# Patient Record
Sex: MALE | Race: WHITE | NOT HISPANIC OR LATINO | URBAN - METROPOLITAN AREA
[De-identification: names, ages, dates, MRNs, and addresses within clinical notes are randomized per-mention and may not be internally consistent; named-entity substitution may affect disease eponyms.]

---

## 2018-12-23 ENCOUNTER — INPATIENT (INPATIENT)
Facility: HOSPITAL | Age: 78
LOS: 15 days | Discharge: HOME | DRG: 149 | End: 2019-01-08
Attending: INTERNAL MEDICINE | Admitting: INTERNAL MEDICINE
Payer: COMMERCIAL

## 2018-12-23 VITALS
RESPIRATION RATE: 19 BRPM | WEIGHT: 250 LBS | SYSTOLIC BLOOD PRESSURE: 138 MMHG | TEMPERATURE: 98 F | DIASTOLIC BLOOD PRESSURE: 89 MMHG | OXYGEN SATURATION: 93 % | HEART RATE: 92 BPM | HEIGHT: 68 IN

## 2018-12-23 LAB
ALBUMIN SERPL ELPH-MCNC: 3.6 G/DL — SIGNIFICANT CHANGE UP (ref 3.5–5)
ALP SERPL-CCNC: 71 U/L — SIGNIFICANT CHANGE UP (ref 40–120)
ALT FLD-CCNC: 32 U/L DA — SIGNIFICANT CHANGE UP (ref 10–60)
ANION GAP SERPL CALC-SCNC: 9 MMOL/L — SIGNIFICANT CHANGE UP (ref 5–17)
APPEARANCE UR: CLEAR — SIGNIFICANT CHANGE UP
APTT BLD: 28.7 SEC — SIGNIFICANT CHANGE UP (ref 27.5–36.3)
AST SERPL-CCNC: 17 U/L — SIGNIFICANT CHANGE UP (ref 10–40)
BILIRUB SERPL-MCNC: 0.4 MG/DL — SIGNIFICANT CHANGE UP (ref 0.2–1.2)
BILIRUB UR-MCNC: NEGATIVE — SIGNIFICANT CHANGE UP
BUN SERPL-MCNC: 16 MG/DL — SIGNIFICANT CHANGE UP (ref 7–18)
CALCIUM SERPL-MCNC: 8.4 MG/DL — SIGNIFICANT CHANGE UP (ref 8.4–10.5)
CHLORIDE SERPL-SCNC: 102 MMOL/L — SIGNIFICANT CHANGE UP (ref 96–108)
CO2 SERPL-SCNC: 25 MMOL/L — SIGNIFICANT CHANGE UP (ref 22–31)
COLOR SPEC: YELLOW — SIGNIFICANT CHANGE UP
CREAT SERPL-MCNC: 1.04 MG/DL — SIGNIFICANT CHANGE UP (ref 0.5–1.3)
DIFF PNL FLD: NEGATIVE — SIGNIFICANT CHANGE UP
GLUCOSE SERPL-MCNC: 204 MG/DL — HIGH (ref 70–99)
GLUCOSE UR QL: 50 MG/DL
HCT VFR BLD CALC: 46 % — SIGNIFICANT CHANGE UP (ref 39–50)
HGB BLD-MCNC: 15.1 G/DL — SIGNIFICANT CHANGE UP (ref 13–17)
INR BLD: 1.09 RATIO — SIGNIFICANT CHANGE UP (ref 0.88–1.16)
KETONES UR-MCNC: NEGATIVE — SIGNIFICANT CHANGE UP
LEUKOCYTE ESTERASE UR-ACNC: NEGATIVE — SIGNIFICANT CHANGE UP
MCHC RBC-ENTMCNC: 31.5 PG — SIGNIFICANT CHANGE UP (ref 27–34)
MCHC RBC-ENTMCNC: 32.8 GM/DL — SIGNIFICANT CHANGE UP (ref 32–36)
MCV RBC AUTO: 95.9 FL — SIGNIFICANT CHANGE UP (ref 80–100)
NITRITE UR-MCNC: NEGATIVE — SIGNIFICANT CHANGE UP
NT-PROBNP SERPL-SCNC: 59 PG/ML — SIGNIFICANT CHANGE UP (ref 0–450)
PH UR: 6 — SIGNIFICANT CHANGE UP (ref 5–8)
PLATELET # BLD AUTO: 253 K/UL — SIGNIFICANT CHANGE UP (ref 150–400)
POTASSIUM SERPL-MCNC: 3.7 MMOL/L — SIGNIFICANT CHANGE UP (ref 3.5–5.3)
POTASSIUM SERPL-SCNC: 3.7 MMOL/L — SIGNIFICANT CHANGE UP (ref 3.5–5.3)
PROT SERPL-MCNC: 7.5 G/DL — SIGNIFICANT CHANGE UP (ref 6–8.3)
PROT UR-MCNC: NEGATIVE — SIGNIFICANT CHANGE UP
PROTHROM AB SERPL-ACNC: 12.1 SEC — SIGNIFICANT CHANGE UP (ref 10–12.9)
RBC # BLD: 4.8 M/UL — SIGNIFICANT CHANGE UP (ref 4.2–5.8)
RBC # FLD: 12.5 % — SIGNIFICANT CHANGE UP (ref 10.3–14.5)
SODIUM SERPL-SCNC: 136 MMOL/L — SIGNIFICANT CHANGE UP (ref 135–145)
SP GR SPEC: 1.01 — SIGNIFICANT CHANGE UP (ref 1.01–1.02)
TROPONIN I SERPL-MCNC: <0.015 NG/ML — SIGNIFICANT CHANGE UP (ref 0–0.04)
UROBILINOGEN FLD QL: NEGATIVE — SIGNIFICANT CHANGE UP
WBC # BLD: 10.4 K/UL — SIGNIFICANT CHANGE UP (ref 3.8–10.5)
WBC # FLD AUTO: 10.4 K/UL — SIGNIFICANT CHANGE UP (ref 3.8–10.5)

## 2018-12-23 PROCEDURE — 70450 CT HEAD/BRAIN W/O DYE: CPT | Mod: 26

## 2018-12-23 PROCEDURE — 71045 X-RAY EXAM CHEST 1 VIEW: CPT | Mod: 26

## 2018-12-23 PROCEDURE — 74174 CTA ABD&PLVS W/CONTRAST: CPT | Mod: 26

## 2018-12-23 PROCEDURE — 71275 CT ANGIOGRAPHY CHEST: CPT | Mod: 26

## 2018-12-23 RX ORDER — MECLIZINE HCL 12.5 MG
25 TABLET ORAL ONCE
Qty: 0 | Refills: 0 | Status: COMPLETED | OUTPATIENT
Start: 2018-12-23 | End: 2018-12-23

## 2018-12-23 RX ORDER — ONDANSETRON 8 MG/1
4 TABLET, FILM COATED ORAL ONCE
Qty: 0 | Refills: 0 | Status: COMPLETED | OUTPATIENT
Start: 2018-12-23 | End: 2018-12-23

## 2018-12-23 RX ADMIN — ONDANSETRON 4 MILLIGRAM(S): 8 TABLET, FILM COATED ORAL at 23:53

## 2018-12-23 RX ADMIN — Medication 25 MILLIGRAM(S): at 23:52

## 2018-12-23 NOTE — ED PROVIDER NOTE - OBJECTIVE STATEMENT
79 y/o M pt with a PMHx of HTN, DM, HLD, Depression, and Anxiety, and no significant PSHx presents to ED c/o dizziness, sweats, and hot flashes after taking Cefuroxime today at 13:00 and 16:00. Pt also c/o L sided CP, abd pain, and polyuria. Per pt, he was prescribed this medication by his PCP for a sinus infection and first took the medicine at 13:00. Pt reportedly had a hot flash at the time. Pt states that he took another dosage of the medication at 16:00 (prescription instructions were BID), and within 10 minutes had another episode of hot flashes and fell to the floor secondary to dizziness. Pt states that he bumped the L side of his head when he fell. Pt denies nausea, vomiting, diarrhea, smoking, EtOH use, drug use, or any other complaints. Allergies: Penicillin.

## 2018-12-23 NOTE — ED PROVIDER NOTE - MEDICAL DECISION MAKING DETAILS
77 y/o M pt with HTN, DM, HLD, presents to ED c/o vertigo; will obtain EKG, labs, CXR, CT head, and CT abd

## 2018-12-23 NOTE — ED PROVIDER NOTE - PROGRESS NOTE DETAILS
labs show neg trop, CXR shows atelectasis vs pleural effusion in L lung field, CT head unremarkable, CTA chest /A/P shows 4.7x4cm thick walled fluid collection in abdominal wall adjacent to hernia repair site  Discussed above with patient. On reeval patient reports only mild improvement of dizziness/vertigo. reports pain over abdominal wall has been progressing for the past 1yr, described it as a burning sensation. Dr. Allen from surgery consulted for further management.   patient agrees with plan for admission. Passed dysphagia screen.   Discussed above with Dr. Us.

## 2018-12-24 DIAGNOSIS — R42 DIZZINESS AND GIDDINESS: ICD-10-CM

## 2018-12-24 DIAGNOSIS — Z29.9 ENCOUNTER FOR PROPHYLACTIC MEASURES, UNSPECIFIED: ICD-10-CM

## 2018-12-24 DIAGNOSIS — E11.9 TYPE 2 DIABETES MELLITUS WITHOUT COMPLICATIONS: ICD-10-CM

## 2018-12-24 DIAGNOSIS — R18.8 OTHER ASCITES: ICD-10-CM

## 2018-12-24 DIAGNOSIS — I10 ESSENTIAL (PRIMARY) HYPERTENSION: ICD-10-CM

## 2018-12-24 DIAGNOSIS — F32.9 MAJOR DEPRESSIVE DISORDER, SINGLE EPISODE, UNSPECIFIED: ICD-10-CM

## 2018-12-24 LAB
24R-OH-CALCIDIOL SERPL-MCNC: 22.6 NG/ML — LOW (ref 30–80)
ALBUMIN SERPL ELPH-MCNC: 3.9 G/DL — SIGNIFICANT CHANGE UP (ref 3.5–5)
ALP SERPL-CCNC: 72 U/L — SIGNIFICANT CHANGE UP (ref 40–120)
ALT FLD-CCNC: 31 U/L DA — SIGNIFICANT CHANGE UP (ref 10–60)
ANION GAP SERPL CALC-SCNC: 9 MMOL/L — SIGNIFICANT CHANGE UP (ref 5–17)
AST SERPL-CCNC: 14 U/L — SIGNIFICANT CHANGE UP (ref 10–40)
BASOPHILS # BLD AUTO: 0.1 K/UL — SIGNIFICANT CHANGE UP (ref 0–0.2)
BASOPHILS NFR BLD AUTO: 1 % — SIGNIFICANT CHANGE UP (ref 0–2)
BILIRUB SERPL-MCNC: 0.8 MG/DL — SIGNIFICANT CHANGE UP (ref 0.2–1.2)
BUN SERPL-MCNC: 16 MG/DL — SIGNIFICANT CHANGE UP (ref 7–18)
CALCIUM SERPL-MCNC: 8.7 MG/DL — SIGNIFICANT CHANGE UP (ref 8.4–10.5)
CHLORIDE SERPL-SCNC: 103 MMOL/L — SIGNIFICANT CHANGE UP (ref 96–108)
CHOLEST SERPL-MCNC: 200 MG/DL — HIGH (ref 10–199)
CK MB BLD-MCNC: 1.5 % — SIGNIFICANT CHANGE UP (ref 0–3.5)
CK MB CFR SERPL CALC: 2.6 NG/ML — SIGNIFICANT CHANGE UP (ref 0–3.6)
CK SERPL-CCNC: 179 U/L — SIGNIFICANT CHANGE UP (ref 35–232)
CO2 SERPL-SCNC: 25 MMOL/L — SIGNIFICANT CHANGE UP (ref 22–31)
CREAT SERPL-MCNC: 0.94 MG/DL — SIGNIFICANT CHANGE UP (ref 0.5–1.3)
EOSINOPHIL # BLD AUTO: 0.3 K/UL — SIGNIFICANT CHANGE UP (ref 0–0.5)
EOSINOPHIL NFR BLD AUTO: 2.3 % — SIGNIFICANT CHANGE UP (ref 0–6)
FOLATE SERPL-MCNC: 14.1 NG/ML — SIGNIFICANT CHANGE UP
GLUCOSE BLDC GLUCOMTR-MCNC: 132 MG/DL — HIGH (ref 70–99)
GLUCOSE BLDC GLUCOMTR-MCNC: 145 MG/DL — HIGH (ref 70–99)
GLUCOSE BLDC GLUCOMTR-MCNC: 157 MG/DL — HIGH (ref 70–99)
GLUCOSE BLDC GLUCOMTR-MCNC: 264 MG/DL — HIGH (ref 70–99)
GLUCOSE SERPL-MCNC: 164 MG/DL — HIGH (ref 70–99)
HBA1C BLD-MCNC: 7.9 % — HIGH (ref 4–5.6)
HCT VFR BLD CALC: 47.2 % — SIGNIFICANT CHANGE UP (ref 39–50)
HDLC SERPL-MCNC: 43 MG/DL — SIGNIFICANT CHANGE UP
HGB BLD-MCNC: 15.6 G/DL — SIGNIFICANT CHANGE UP (ref 13–17)
LIPID PNL WITH DIRECT LDL SERPL: 126 MG/DL — SIGNIFICANT CHANGE UP
LYMPHOCYTES # BLD AUTO: 2.7 K/UL — SIGNIFICANT CHANGE UP (ref 1–3.3)
LYMPHOCYTES # BLD AUTO: 21 % — SIGNIFICANT CHANGE UP (ref 13–44)
MAGNESIUM SERPL-MCNC: 2.1 MG/DL — SIGNIFICANT CHANGE UP (ref 1.6–2.6)
MCHC RBC-ENTMCNC: 31.6 PG — SIGNIFICANT CHANGE UP (ref 27–34)
MCHC RBC-ENTMCNC: 33 GM/DL — SIGNIFICANT CHANGE UP (ref 32–36)
MCV RBC AUTO: 95.6 FL — SIGNIFICANT CHANGE UP (ref 80–100)
MONOCYTES # BLD AUTO: 0.9 K/UL — SIGNIFICANT CHANGE UP (ref 0–0.9)
MONOCYTES NFR BLD AUTO: 6.8 % — SIGNIFICANT CHANGE UP (ref 2–14)
NEUTROPHILS # BLD AUTO: 8.7 K/UL — HIGH (ref 1.8–7.4)
NEUTROPHILS NFR BLD AUTO: 68.8 % — SIGNIFICANT CHANGE UP (ref 43–77)
PHOSPHATE SERPL-MCNC: 3.3 MG/DL — SIGNIFICANT CHANGE UP (ref 2.5–4.5)
PLATELET # BLD AUTO: 276 K/UL — SIGNIFICANT CHANGE UP (ref 150–400)
POTASSIUM SERPL-MCNC: 3.8 MMOL/L — SIGNIFICANT CHANGE UP (ref 3.5–5.3)
POTASSIUM SERPL-SCNC: 3.8 MMOL/L — SIGNIFICANT CHANGE UP (ref 3.5–5.3)
PROT SERPL-MCNC: 7.7 G/DL — SIGNIFICANT CHANGE UP (ref 6–8.3)
RBC # BLD: 4.94 M/UL — SIGNIFICANT CHANGE UP (ref 4.2–5.8)
RBC # FLD: 12.4 % — SIGNIFICANT CHANGE UP (ref 10.3–14.5)
SODIUM SERPL-SCNC: 137 MMOL/L — SIGNIFICANT CHANGE UP (ref 135–145)
TOTAL CHOLESTEROL/HDL RATIO MEASUREMENT: 4.7 RATIO — SIGNIFICANT CHANGE UP (ref 3.4–9.6)
TRIGL SERPL-MCNC: 154 MG/DL — HIGH (ref 10–149)
TROPONIN I SERPL-MCNC: <0.015 NG/ML — SIGNIFICANT CHANGE UP (ref 0–0.04)
TSH SERPL-MCNC: 2.39 UU/ML — SIGNIFICANT CHANGE UP (ref 0.34–4.82)
VIT B12 SERPL-MCNC: 366 PG/ML — SIGNIFICANT CHANGE UP (ref 232–1245)
WBC # BLD: 12.7 K/UL — HIGH (ref 3.8–10.5)
WBC # FLD AUTO: 12.7 K/UL — HIGH (ref 3.8–10.5)

## 2018-12-24 PROCEDURE — 99285 EMERGENCY DEPT VISIT HI MDM: CPT

## 2018-12-24 RX ORDER — METFORMIN HYDROCHLORIDE 850 MG/1
0 TABLET ORAL
Qty: 0 | Refills: 0 | COMMUNITY

## 2018-12-24 RX ORDER — ATORVASTATIN CALCIUM 80 MG/1
40 TABLET, FILM COATED ORAL AT BEDTIME
Qty: 0 | Refills: 0 | Status: DISCONTINUED | OUTPATIENT
Start: 2018-12-24 | End: 2019-01-08

## 2018-12-24 RX ORDER — PANTOPRAZOLE SODIUM 20 MG/1
40 TABLET, DELAYED RELEASE ORAL
Qty: 0 | Refills: 0 | Status: DISCONTINUED | OUTPATIENT
Start: 2018-12-24 | End: 2019-01-08

## 2018-12-24 RX ORDER — ATORVASTATIN CALCIUM 80 MG/1
0 TABLET, FILM COATED ORAL
Qty: 0 | Refills: 0 | COMMUNITY

## 2018-12-24 RX ORDER — ESCITALOPRAM OXALATE 10 MG/1
1 TABLET, FILM COATED ORAL
Qty: 0 | Refills: 0 | COMMUNITY

## 2018-12-24 RX ORDER — RISPERIDONE 4 MG/1
1 TABLET ORAL
Qty: 0 | Refills: 0 | COMMUNITY

## 2018-12-24 RX ORDER — ESCITALOPRAM OXALATE 10 MG/1
0 TABLET, FILM COATED ORAL
Qty: 0 | Refills: 0 | COMMUNITY

## 2018-12-24 RX ORDER — RISPERIDONE 4 MG/1
0 TABLET ORAL
Qty: 0 | Refills: 0 | COMMUNITY

## 2018-12-24 RX ORDER — RISPERIDONE 4 MG/1
0.5 TABLET ORAL DAILY
Qty: 0 | Refills: 0 | Status: DISCONTINUED | OUTPATIENT
Start: 2018-12-24 | End: 2019-01-08

## 2018-12-24 RX ORDER — INSULIN LISPRO 100/ML
VIAL (ML) SUBCUTANEOUS
Qty: 0 | Refills: 0 | Status: DISCONTINUED | OUTPATIENT
Start: 2018-12-24 | End: 2019-01-08

## 2018-12-24 RX ORDER — HEPARIN SODIUM 5000 [USP'U]/ML
5000 INJECTION INTRAVENOUS; SUBCUTANEOUS EVERY 12 HOURS
Qty: 0 | Refills: 0 | Status: DISCONTINUED | OUTPATIENT
Start: 2018-12-24 | End: 2018-12-27

## 2018-12-24 RX ORDER — LISINOPRIL 2.5 MG/1
20 TABLET ORAL DAILY
Qty: 0 | Refills: 0 | Status: DISCONTINUED | OUTPATIENT
Start: 2018-12-24 | End: 2018-12-26

## 2018-12-24 RX ORDER — ATORVASTATIN CALCIUM 80 MG/1
1 TABLET, FILM COATED ORAL
Qty: 0 | Refills: 0 | COMMUNITY

## 2018-12-24 RX ORDER — CARVEDILOL PHOSPHATE 80 MG/1
0 CAPSULE, EXTENDED RELEASE ORAL
Qty: 0 | Refills: 0 | COMMUNITY

## 2018-12-24 RX ORDER — DEXTROSE 50 % IN WATER 50 %
15 SYRINGE (ML) INTRAVENOUS ONCE
Qty: 0 | Refills: 0 | Status: DISCONTINUED | OUTPATIENT
Start: 2018-12-24 | End: 2018-12-24

## 2018-12-24 RX ORDER — METFORMIN HYDROCHLORIDE 850 MG/1
1 TABLET ORAL
Qty: 0 | Refills: 0 | COMMUNITY

## 2018-12-24 RX ORDER — DEXTROSE 50 % IN WATER 50 %
25 SYRINGE (ML) INTRAVENOUS ONCE
Qty: 0 | Refills: 0 | Status: DISCONTINUED | OUTPATIENT
Start: 2018-12-24 | End: 2018-12-24

## 2018-12-24 RX ORDER — DEXTROSE 50 % IN WATER 50 %
12.5 SYRINGE (ML) INTRAVENOUS ONCE
Qty: 0 | Refills: 0 | Status: DISCONTINUED | OUTPATIENT
Start: 2018-12-24 | End: 2018-12-24

## 2018-12-24 RX ORDER — GLUCAGON INJECTION, SOLUTION 0.5 MG/.1ML
1 INJECTION, SOLUTION SUBCUTANEOUS ONCE
Qty: 0 | Refills: 0 | Status: DISCONTINUED | OUTPATIENT
Start: 2018-12-24 | End: 2018-12-24

## 2018-12-24 RX ORDER — ESCITALOPRAM OXALATE 10 MG/1
20 TABLET, FILM COATED ORAL DAILY
Qty: 0 | Refills: 0 | Status: DISCONTINUED | OUTPATIENT
Start: 2018-12-24 | End: 2019-01-08

## 2018-12-24 RX ORDER — SODIUM CHLORIDE 9 MG/ML
1000 INJECTION, SOLUTION INTRAVENOUS
Qty: 0 | Refills: 0 | Status: DISCONTINUED | OUTPATIENT
Start: 2018-12-24 | End: 2018-12-24

## 2018-12-24 RX ADMIN — Medication 1: at 07:51

## 2018-12-24 RX ADMIN — HEPARIN SODIUM 5000 UNIT(S): 5000 INJECTION INTRAVENOUS; SUBCUTANEOUS at 17:42

## 2018-12-24 RX ADMIN — Medication 30 MILLILITER(S): at 11:02

## 2018-12-24 RX ADMIN — LISINOPRIL 20 MILLIGRAM(S): 2.5 TABLET ORAL at 06:27

## 2018-12-24 RX ADMIN — ESCITALOPRAM OXALATE 20 MILLIGRAM(S): 10 TABLET, FILM COATED ORAL at 11:02

## 2018-12-24 RX ADMIN — PANTOPRAZOLE SODIUM 40 MILLIGRAM(S): 20 TABLET, DELAYED RELEASE ORAL at 11:02

## 2018-12-24 RX ADMIN — HEPARIN SODIUM 5000 UNIT(S): 5000 INJECTION INTRAVENOUS; SUBCUTANEOUS at 06:26

## 2018-12-24 RX ADMIN — ATORVASTATIN CALCIUM 40 MILLIGRAM(S): 80 TABLET, FILM COATED ORAL at 21:45

## 2018-12-24 RX ADMIN — Medication 3: at 13:17

## 2018-12-24 RX ADMIN — RISPERIDONE 0.5 MILLIGRAM(S): 4 TABLET ORAL at 11:02

## 2018-12-24 NOTE — H&P ADULT - PROBLEM SELECTOR PLAN 6
IMPROVE VTE Individual Risk Assessment          RISK                                                          Points  [  ] Previous VTE                                                3  [  ] Thrombophilia                                             2  [  ] Lower limb paralysis                                   2        (unable to hold up >15 seconds)    [  ] Current Cancer                                             2         (within 6 months)  [ x ] Immobilization > 24 hrs                              1  [  ] ICU/CCU stay > 24 hours                             1  [ x ] Age > 60                                                         1    IMPROVE VTE Score: 2  HSQ for DVT chemoppx

## 2018-12-24 NOTE — ED ADULT NURSE NOTE - OBJECTIVE STATEMENT
Pt. c/o dizziness. Pt. stated he almost fell. Pt. c/o upset stomach "my stomach burns when I take medication".

## 2018-12-24 NOTE — H&P ADULT - ASSESSMENT
78M, from home, AAOx3, w/ PMHx T2DM, HTN, HLD, Depression who presents to the ED c/o dizziness and diaphoresis after taking Cefuroxime 500 mg at 4pm, in addition to face flushing and nausea. Patient mentions symptoms started 15 minutes after taking medication, afterwards, he stood up to grab some water, felt dizzy and fell to the floor. Denies chest pain, palpitations, weakness, LOC or any other complaint. Patient was diagnosed with sinusitis by ENT and was prescribed Cefuroxime x 7 days. Upon further questioning, patient endorses PCN allergy in the past.
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
## 2018-12-24 NOTE — ED ADULT NURSE NOTE - NSIMPLEMENTINTERV_GEN_ALL_ED
Implemented All Fall with Harm Risk Interventions:  [place] to call system. Call bell, personal items and telephone within reach. Instruct patient to call for assistance. Room bathroom lighting operational. Non-slip footwear when patient is off stretcher. Physically safe environment: no spills, clutter or unnecessary equipment. Stretcher in lowest position, wheels locked, appropriate side rails in place. Provide visual cue, wrist band, yellow gown, etc. Monitor gait and stability. Monitor for mental status changes and reorient to person, place, and time. Review medications for side effects contributing to fall risk. Reinforce activity limits and safety measures with patient and family. Provide visual clues: red socks.

## 2018-12-24 NOTE — ED ADULT NURSE REASSESSMENT NOTE - NS ED NURSE REASSESS COMMENT FT1
Pt. c/o chest pain. Dr. Haywood notified and made aware and is going to assess pt. Pt. is on continuos cardiac monitor at this time at normal sinus rhythm.

## 2018-12-24 NOTE — H&P ADULT - PROBLEM SELECTOR PLAN 1
Patient p/w dizziness, diaphoresis, nausea and face flushing after taking Cefuroxime. Symptoms most likely due to allergic reaction.   Asymptomatic at the moment.  ECG: NSR VR@92bpm T1: negative  CT brain: no acute pathology  c/w supportive care

## 2018-12-24 NOTE — ED ADULT NURSE NOTE - ED STAT RN HANDOFF DETAILS
received Pt alert and verbally responsive NAD on N/C 3lit/min telemetry in progress transferred to [facility] endorsed to Ally PICKETT safety maintained

## 2018-12-24 NOTE — H&P ADULT - PROBLEM SELECTOR PLAN 2
Patient w/ Hx of hernia repair 10yrs ago, denies abdominal pain  CT A/P: 4.7 x 0.7 x 4.0 cm thick-walled fluid collection within the anterior   abdominal and an umbilical hernia repair site. No significant surrounding   inflammation.  Surgery consulted in the ED

## 2018-12-24 NOTE — H&P ADULT - ATTENDING COMMENTS
78M, from home, AAOx3, w/ PMHx T2DM, HTN, HLD, Depression who presents to the ED c/o dizziness and diaphoresis after taking Cefuroxime 500 mg at 4pm, in addition to face flushing and nausea. Patient mentions symptoms started 15 minutes after taking medication, afterwards, he stood up to grab some water, felt dizzy and fell to the floor. Denies chest pain, palpitations, weakness, LOC or any other complaint. Patient was diagnosed with sinusitis by ENT and was prescribed Cefuroxime x 7 days. Upon further questioning, patient endorses PCN allergy in the past.     pt seen in bed, vitals stable, physical exam reveals lungs cta b/l, heart s1s2, abd soft nd nt, midline surg scar, bs+, ext no edema. labs and diagnostic test result reviewed.    assessment  --  vasovagal episode possibly 2nd acute drug reaction, abdominal wall fluid collection, h/o DM, HTN, HLD, Depression    plan  --  admit to med, d/c cefuroxime, maalox, cont preadmit home meds, gi and dvt profilaxis,  cbc, bmp, mg, phos, lipids, tsh,    surg cons

## 2018-12-25 DIAGNOSIS — E55.9 VITAMIN D DEFICIENCY, UNSPECIFIED: ICD-10-CM

## 2018-12-25 LAB
CULTURE RESULTS: SIGNIFICANT CHANGE UP
GLUCOSE BLDC GLUCOMTR-MCNC: 139 MG/DL — HIGH (ref 70–99)
GLUCOSE BLDC GLUCOMTR-MCNC: 145 MG/DL — HIGH (ref 70–99)
GLUCOSE BLDC GLUCOMTR-MCNC: 178 MG/DL — HIGH (ref 70–99)
GLUCOSE BLDC GLUCOMTR-MCNC: 225 MG/DL — HIGH (ref 70–99)
SPECIMEN SOURCE: SIGNIFICANT CHANGE UP

## 2018-12-25 PROCEDURE — 99222 1ST HOSP IP/OBS MODERATE 55: CPT

## 2018-12-25 RX ORDER — RAMIPRIL 5 MG
1 CAPSULE ORAL
Qty: 30 | Refills: 0 | OUTPATIENT
Start: 2018-12-25 | End: 2019-01-23

## 2018-12-25 RX ORDER — CHOLECALCIFEROL (VITAMIN D3) 125 MCG
1000 CAPSULE ORAL DAILY
Qty: 0 | Refills: 0 | Status: DISCONTINUED | OUTPATIENT
Start: 2018-12-25 | End: 2018-12-26

## 2018-12-25 RX ORDER — LISINOPRIL 2.5 MG/1
1 TABLET ORAL
Qty: 0 | Refills: 0 | COMMUNITY
Start: 2018-12-25

## 2018-12-25 RX ORDER — SODIUM CHLORIDE 9 MG/ML
500 INJECTION INTRAMUSCULAR; INTRAVENOUS; SUBCUTANEOUS ONCE
Qty: 0 | Refills: 0 | Status: COMPLETED | OUTPATIENT
Start: 2018-12-25 | End: 2018-12-25

## 2018-12-25 RX ORDER — MECLIZINE HCL 12.5 MG
25 TABLET ORAL EVERY 8 HOURS
Qty: 0 | Refills: 0 | Status: DISCONTINUED | OUTPATIENT
Start: 2018-12-25 | End: 2019-01-08

## 2018-12-25 RX ORDER — CHOLECALCIFEROL (VITAMIN D3) 125 MCG
1 CAPSULE ORAL
Qty: 30 | Refills: 0 | OUTPATIENT
Start: 2018-12-25 | End: 2019-01-23

## 2018-12-25 RX ORDER — CEFUROXIME AXETIL 250 MG
1 TABLET ORAL
Qty: 0 | Refills: 0 | COMMUNITY

## 2018-12-25 RX ORDER — RAMIPRIL 5 MG
1 CAPSULE ORAL
Qty: 0 | Refills: 0 | COMMUNITY

## 2018-12-25 RX ADMIN — Medication 25 MILLIGRAM(S): at 21:41

## 2018-12-25 RX ADMIN — Medication 25 MILLIGRAM(S): at 15:30

## 2018-12-25 RX ADMIN — RISPERIDONE 0.5 MILLIGRAM(S): 4 TABLET ORAL at 11:48

## 2018-12-25 RX ADMIN — ESCITALOPRAM OXALATE 20 MILLIGRAM(S): 10 TABLET, FILM COATED ORAL at 11:48

## 2018-12-25 RX ADMIN — PANTOPRAZOLE SODIUM 40 MILLIGRAM(S): 20 TABLET, DELAYED RELEASE ORAL at 06:04

## 2018-12-25 RX ADMIN — LISINOPRIL 20 MILLIGRAM(S): 2.5 TABLET ORAL at 06:04

## 2018-12-25 RX ADMIN — ATORVASTATIN CALCIUM 40 MILLIGRAM(S): 80 TABLET, FILM COATED ORAL at 21:41

## 2018-12-25 RX ADMIN — Medication 1: at 22:28

## 2018-12-25 RX ADMIN — SODIUM CHLORIDE 1000 MILLILITER(S): 9 INJECTION INTRAMUSCULAR; INTRAVENOUS; SUBCUTANEOUS at 20:36

## 2018-12-25 RX ADMIN — HEPARIN SODIUM 5000 UNIT(S): 5000 INJECTION INTRAVENOUS; SUBCUTANEOUS at 06:04

## 2018-12-25 RX ADMIN — HEPARIN SODIUM 5000 UNIT(S): 5000 INJECTION INTRAVENOUS; SUBCUTANEOUS at 17:40

## 2018-12-25 RX ADMIN — Medication 1000 UNIT(S): at 11:48

## 2018-12-25 RX ADMIN — Medication 2: at 11:48

## 2018-12-25 NOTE — DISCHARGE NOTE ADULT - PLAN OF CARE
You were found to have 4.7 x 0.7 x 4.0 cm thick-walled fluid collection within the anterior abdominal and an umbilical hernia repair site. No significant surrounding inflammation on CT scan of your abdomen. You were seen by Dr Ho, surgeon who did not recommend surgical intervention. Hemoglobin A1C<7.0 Your hemoglobin A1C on admission was 7.9. COntinue with home dose of metformin. You are advised a diabetic diet with consistent carbohydrates, exercise regularly. Measure your blood sugars on a regular basis. Follow up with your primary doctor. Blood pressure <130/80 mm Hg Continue with your medications as prescribed. Vitamin D levels 30-80 Your vitamin D levels on admission were 22. Continue with vitamin D supplement as prescribed. Prevention of recurrence You were admitted after you reported lightheadedness and flushing of face after taking cefuroxime. An infection was ruled out. You were diagnosed to have an allergic reaction to the cefuroxime which was likely cause for your lightheadedness. Your hemoglobin A1C on admission was 7.9. Continue with home dose of metformin. You are advised a diabetic diet with consistent carbohydrates, exercise regularly. Measure your blood sugars on a regular basis. Follow up with your primary doctor. You were admitted after you reported lightheadedness and flushing of face after taking cefuroxime. An infection was ruled out. You were seen by neurologist Dr Forrest for continued vertigo, MRI and MRA head were done which ruled out stroke. You developed weakness of legs greater than that of arms and were unable to walk for which you are being transferred to [place] for further management. You presented with vertigo-like symptoms, diaphoresis, nausea and face flushing after taking Cefuroxime. EKG was normal. MRI/MRA showed no acute infarct or aneurysm but chronic microvascular ischemic changes. For suspected mastoiditis, you completed Levaquin 500mg daily x 7 days. Echocardiogram showed grossly normal LV function, mild aortic stenosis, grade 1 diastolic dysfunction. Neurologist Lon suggested that your symptoms could be secondary to a variant of Guillain-Barré syndrome variant or myasthenia gravis. You reported chest pain, shortness of breath, and itchiness during IVIG treatment on 12/30. You tolerated next IVIG injection 12/31 with premedications suggested by Dr. Forrest. However, further IVIG treatments were held due to the side effects you had and the increased risk of MI with IVIG treatment. Chest pain and shortness of breath resolved shortly after the termination of treatment. Your abnormal extraocular muscle movement and diplopia resolved with the 3 days of IVIG treatment. CSF studies was grossly normal. Paraneoplastic antibody profile was negative. We urge you to follow up with Neurologist Dr. Suero as an outpatient for nerve conduction velocity and electromyography testings. You were given business card of Dr. Suero. In case you lost the contact information, please  refer to the following contact information of Dr. Suero.  Phone: [phone]  [facility] [address], [address] [place] [address] [place] [address] You complained of burning abdominal pain during the latter half of your admission. Maalox and PPI were continued for symptomatically management. GI specialist Dr. Pierre performed EGD/Colonoscopy, which showed GERD, gastritis, and diverticula without diverticulitis, resected, sent for pathology. You are urged to follow up with Dr. Pierre for further management of his symptoms and pathology follow up. Dr. Pierre's contact information is listed below.

## 2018-12-25 NOTE — CONSULT NOTE ADULT - ASSESSMENT
Truncal ataxia and EOM disorder that is difficult to examine because he cannot sustain effort to keep his eyes open, lethargy, and staggering could be signs of brain stem infarct - "parinaud's syndrome" or cerebellar infarct will have to be investigated by MRI brain and MRA head  Generalized weakness blurred vision and ptosis with intermittent ophthalmoplegia preserved reflexes and inability to stand could be consistent with myasthenia gravis. Fatigue muscle weakness and EOM disorder are consistent with myastheia gravis- recommend investigate by  acterylcholine receptor antibody. Continue aspirin atorvastatin and BP control

## 2018-12-25 NOTE — DISCHARGE NOTE ADULT - MEDICATION SUMMARY - MEDICATIONS TO TAKE
I will START or STAY ON the medications listed below when I get home from the hospital:    lisinopril 20 mg oral tablet  -- 1 tab(s) by mouth once a day  -- Indication: For HTN (hypertension)    escitalopram 20 mg oral tablet  -- 1 tab(s) by mouth once a day  -- Indication: For Depression    metFORMIN 500 mg oral tablet  -- 1 tab(s) by mouth 3 times a day  -- Indication: For DM (diabetes mellitus)    Lipitor 40 mg oral tablet  -- 1 tab(s) by mouth once a day  -- Indication: For HLD (hyperlipidemia)    risperiDONE 0.5 mg oral tablet  -- 1 tab(s) by mouth once a day  -- Indication: For Depression    cholecalciferol oral tablet  -- 1 tab(s) by mouth once a day   -- Indication: For Vitamin D deficiency I will START or STAY ON the medications listed below when I get home from the hospital:    ramipril 5 mg oral capsule  -- 1 cap(s) by mouth once a day  -- Indication: For HTN (hypertension)    escitalopram 20 mg oral tablet  -- 1 tab(s) by mouth once a day  -- Indication: For Depression    metFORMIN 500 mg oral tablet  -- 1 tab(s) by mouth 3 times a day  -- Indication: For DM (diabetes mellitus)    Lipitor 40 mg oral tablet  -- 1 tab(s) by mouth once a day  -- Indication: For HLD (hyperlipidemia)    risperiDONE 0.5 mg oral tablet  -- 1 tab(s) by mouth once a day  -- Indication: For Depression    cholecalciferol oral tablet  -- 1 tab(s) by mouth once a day   -- Indication: For Vitamin D deficiency I will START or STAY ON the medications listed below when I get home from the hospital:    ramipril 5 mg oral capsule  -- 1 cap(s) by mouth once a day  -- Indication: For HTN (hypertension)    escitalopram 20 mg oral tablet  -- 1 tab(s) by mouth once a day  -- Indication: For Depression    metFORMIN 500 mg oral tablet  -- 1 tab(s) by mouth 3 times a day  -- Indication: For DM (diabetes mellitus)    Lipitor 40 mg oral tablet  -- 1 tab(s) by mouth once a day  -- Indication: For HLD (hyperlipidemia)    risperiDONE 0.5 mg oral tablet  -- 1 tab(s) by mouth once a day  -- Indication: For Depression    omeprazole 40 mg oral delayed release capsule  -- 1 cap(s) by mouth once a day (in the morning) MDD:1 tablet  -- It is very important that you take or use this exactly as directed.  Do not skip doses or discontinue unless directed by your doctor.  Obtain medical advice before taking any non-prescription drugs as some may affect the action of this medication.  Swallow whole.  Do not crush.    -- Indication: For GERD, gastritis    Calcet 500 mg-400 intl units oral tablet, chewable  -- 1 tab(s) chewed once a day   -- Take with food or milk.    -- Indication: For Supplement    cholecalciferol oral tablet  -- 1 tab(s) by mouth once a day   -- Indication: For Vitamin D deficiency

## 2018-12-25 NOTE — DISCHARGE NOTE ADULT - MEDICATION SUMMARY - MEDICATIONS TO STOP TAKING
I will STOP taking the medications listed below when I get home from the hospital:    cefuroxime 500 mg oral tablet  -- 1 tab(s) by mouth every 12 hours    benzonatate 200 mg oral capsule  -- 1 cap(s) by mouth 3 times a day

## 2018-12-25 NOTE — CONSULT NOTE ADULT - SUBJECTIVE AND OBJECTIVE BOX
Patient is a 78y old  Male who presents with a chief complaint of dizziness (25 Dec 2018 13:41)      HPI: About 3 weeks ago he noticed dizziness associated with headache, nasal discharge and ear discomfort. Examined by ENT, he was prescribed an antibiotic, but after the first dose he fell to the floor and could not get up by himself. Now he reports blurred vision continuing dizziness that is qualified as a sensation of motion not spinning and a sensation of unsteadiness.     PAST MEDICAL & SURGICAL HISTORY:  Anxiety  Depression  HLD (hyperlipidemia)  DM (diabetes mellitus)  HTN (hypertension)  No significant past surgical history      FAMILY HISTORY:  Family history of heart disease (Father)        Social Hx:  Nonsmoker, no drug or alcohol use    MEDICATIONS  (STANDING):  atorvastatin 40 milliGRAM(s) Oral at bedtime  cholecalciferol 1000 Unit(s) Oral daily  escitalopram 20 milliGRAM(s) Oral daily  heparin  Injectable 5000 Unit(s) SubCutaneous every 12 hours  insulin lispro (HumaLOG) corrective regimen sliding scale   SubCutaneous Before meals and at bedtime  lisinopril 20 milliGRAM(s) Oral daily  meclizine 25 milliGRAM(s) Oral every 8 hours  pantoprazole    Tablet 40 milliGRAM(s) Oral before breakfast  risperiDONE   Tablet 0.5 milliGRAM(s) Oral daily       Allergies    penicillin (Hives)    Intolerances        ROS: Pertinent positives in HPI, all other ROS were reviewed and are negative.      Vital Signs Last 24 Hrs  T(C): 36.7 (25 Dec 2018 14:44), Max: 37.1 (25 Dec 2018 01:50)  T(F): 98 (25 Dec 2018 14:44), Max: 98.7 (25 Dec 2018 01:50)  HR: 80 (25 Dec 2018 14:44) (79 - 91)  BP: 104/49 (25 Dec 2018 14:44) (92/47 - 126/76)  BP(mean): --  RR: 17 (25 Dec 2018 14:44) (16 - 18)  SpO2: 96% (25 Dec 2018 14:44) (91% - 96%)        Constitutional: awake and alert.  HEENT: PERRLA, EOMI,   Neck: Supple.  Respiratory: Breath sounds are clear bilaterally  Cardiovascular: S1 and S2, regular / irregular rhythm  Gastrointestinal: soft, nontender  Extremities:  no edema  Vascular: Carotid Bruit - no  Musculoskeletal: no joint swelling/tenderness, no abnormal movements  Skin: No rashes    Neurological exam:  HF: A x O x 3. Appropriately interactive, normal affect. Speech fluent, No Aphasia or paraphasic errors. Naming /repetition intact   CN: JOE, EOMI variably weak in left conjugate gaze, VFF, facial sensation normal, no NLFD, tongue midline, Palate moves equally, SCM equal bilaterally  Motor: No pronator drift, Strength 3/5 in all 4 ext, normal bulk and tone, no tremor, rigidity or bradykinesia.    Sens: Intact to light touch / PP/ VS/ JS    Reflexes: Symmetric and normal . BJ 2+, BR 2+, KJ 1+, AJ1+, downgoing toes b/l  Coord:  No FNFA, dysmetria, JUAN intact but demonstrates past pointing  Gait/Balance: cannot stand with feet together, cannot walk tandem    NIHSS:3          Labs:                        15.6   12.7  )-----------( 276      ( 24 Dec 2018 04:49 )             47.2     12-24    137  |  103  |  16  ----------------------------<  164<H>  3.8   |  25  |  0.94    Ca    8.7      24 Dec 2018 04:53  Phos  3.3     12-24  Mg     2.1     12-24    TPro  7.7  /  Alb  3.9  /  TBili  0.8  /  DBili  x   /  AST  14  /  ALT  31  /  AlkPhos  72  12-24    12-24 [id number] 7.9    12-24 Chol 200<H>  HDL 43 Trig 154<H>  PT/INR - ( 23 Dec 2018 22:26 )   PT: 12.1 sec;   INR: 1.09 ratio         PTT - ( 23 Dec 2018 22:26 )  PTT:28.7 sec    Radiology report:  - CT head:  < from: CT Head No Cont (12.23.18 @ 23:08) >  EXAM:  CT BRAIN                            PROCEDURE DATE:  12/23/2018          INTERPRETATION:  CLINICAL INFORMATION: Vertigo.    COMPARISON: None available.    PROCEDURE:   Noncontrast CT of the Head was performed from the skull base to the   vertex. Coronal and Sagittal reformats were obtained.    FINDINGS:    There is no acute intracranial hemorrhage, mass effect, midline shift,   extra-axial collection, hydrocephalus, or evidence of acute vascular   territorial infarction. Mild patchy hypodensities within the   periventricular and subcortical white matter, although nonspecific,   likely reflect chronic microvascular disease.    The visualized paranasal sinuses and mastoid air cells are clear. Status   post bilateral cataract surgery. Visualized osseous structures are intact.    IMPRESSION:     No acute intracranial hemorrhage, mass effect, or evidence of acute   vascular territorial infarction.    If clinical symptoms persist or worsen, more sensitive evaluation with   brain MRI may be obtained, if no contraindications exist.                ZULLY THOMASON M.D., ATTENDING RADIOLOGIST    < end of copied text >    -

## 2018-12-25 NOTE — DISCHARGE NOTE ADULT - CARE PLAN
Principal Discharge DX:	Vasovagal episode  Goal:	Prevention of recurrence  Assessment and plan of treatment:	You were admitted after you reported lightheadedness and flushing of face after taking cefuroxime. An infection was ruled out. You were diagnosed to have an allergic reaction to the cefuroxime which was likely cause for your lightheadedness.  Secondary Diagnosis:	Abdominal wall fluid collections  Assessment and plan of treatment:	You were found to have 4.7 x 0.7 x 4.0 cm thick-walled fluid collection within the anterior abdominal and an umbilical hernia repair site. No significant surrounding inflammation on CT scan of your abdomen. You were seen by Dr Ho, surgeon who did not recommend surgical intervention.  Secondary Diagnosis:	DM (diabetes mellitus)  Goal:	Hemoglobin A1C<7.0  Assessment and plan of treatment:	Your hemoglobin A1C on admission was 7.9. COntinue with home dose of metformin. You are advised a diabetic diet with consistent carbohydrates, exercise regularly. Measure your blood sugars on a regular basis. Follow up with your primary doctor.  Secondary Diagnosis:	Essential hypertension  Goal:	Blood pressure <130/80 mm Hg  Assessment and plan of treatment:	Continue with your medications as prescribed.  Secondary Diagnosis:	Depression  Assessment and plan of treatment:	Continue with your medications as prescribed.  Secondary Diagnosis:	Vitamin D deficiency  Goal:	Vitamin D levels 30-80  Assessment and plan of treatment:	Your vitamin D levels on admission were 22. Continue with vitamin D supplement as prescribed. Principal Discharge DX:	Vertigo  Goal:	Prevention of recurrence  Assessment and plan of treatment:	You were admitted after you reported lightheadedness and flushing of face after taking cefuroxime. An infection was ruled out. You were seen by neurologist Dr Forrest for continued vertigo, MRI and MRA head were done which ruled out stroke. You developed weakness of legs greater than that of arms and were unable to walk for which you are being transferred to [place] for further management.  Secondary Diagnosis:	Abdominal wall fluid collections  Assessment and plan of treatment:	You were found to have 4.7 x 0.7 x 4.0 cm thick-walled fluid collection within the anterior abdominal and an umbilical hernia repair site. No significant surrounding inflammation on CT scan of your abdomen. You were seen by Dr Ho, surgeon who did not recommend surgical intervention.  Secondary Diagnosis:	DM (diabetes mellitus)  Goal:	Hemoglobin A1C<7.0  Assessment and plan of treatment:	Your hemoglobin A1C on admission was 7.9. Continue with home dose of metformin. You are advised a diabetic diet with consistent carbohydrates, exercise regularly. Measure your blood sugars on a regular basis. Follow up with your primary doctor.  Secondary Diagnosis:	Essential hypertension  Goal:	Blood pressure <130/80 mm Hg  Assessment and plan of treatment:	Continue with your medications as prescribed.  Secondary Diagnosis:	Depression  Assessment and plan of treatment:	Continue with your medications as prescribed.  Secondary Diagnosis:	Vitamin D deficiency  Goal:	Vitamin D levels 30-80  Assessment and plan of treatment:	Your vitamin D levels on admission were 22. Continue with vitamin D supplement as prescribed. Principal Discharge DX:	Vertigo  Goal:	Prevention of recurrence  Secondary Diagnosis:	Abdominal wall fluid collections  Assessment and plan of treatment:	You were found to have 4.7 x 0.7 x 4.0 cm thick-walled fluid collection within the anterior abdominal and an umbilical hernia repair site. No significant surrounding inflammation on CT scan of your abdomen. You were seen by Dr Ho, surgeon who did not recommend surgical intervention.  Secondary Diagnosis:	DM (diabetes mellitus)  Goal:	Hemoglobin A1C<7.0  Assessment and plan of treatment:	Your hemoglobin A1C on admission was 7.9. Continue with home dose of metformin. You are advised a diabetic diet with consistent carbohydrates, exercise regularly. Measure your blood sugars on a regular basis. Follow up with your primary doctor.  Secondary Diagnosis:	Essential hypertension  Goal:	Blood pressure <130/80 mm Hg  Assessment and plan of treatment:	Continue with your medications as prescribed.  Secondary Diagnosis:	Depression  Assessment and plan of treatment:	Continue with your medications as prescribed.  Secondary Diagnosis:	Vitamin D deficiency  Goal:	Vitamin D levels 30-80  Assessment and plan of treatment:	Your vitamin D levels on admission were 22. Continue with vitamin D supplement as prescribed.  Secondary Diagnosis:	Abdominal pain Principal Discharge DX:	Vertigo  Goal:	Prevention of recurrence  Assessment and plan of treatment:	You presented with vertigo-like symptoms, diaphoresis, nausea and face flushing after taking Cefuroxime. EKG was normal. MRI/MRA showed no acute infarct or aneurysm but chronic microvascular ischemic changes. For suspected mastoiditis, you completed Levaquin 500mg daily x 7 days. Echocardiogram showed grossly normal LV function, mild aortic stenosis, grade 1 diastolic dysfunction. Neurologist Lon suggested that your symptoms could be secondary to a variant of Guillain-Barré syndrome variant or myasthenia gravis. You reported chest pain, shortness of breath, and itchiness during IVIG treatment on 12/30. You tolerated next IVIG injection 12/31 with premedications suggested by Dr. Forrest. However, further IVIG treatments were held due to the side effects you had and the increased risk of MI with IVIG treatment. Chest pain and shortness of breath resolved shortly after the termination of treatment. Your abnormal extraocular muscle movement and diplopia resolved with the 3 days of IVIG treatment. CSF studies was grossly normal. Paraneoplastic antibody profile was negative. We urge you to follow up with Neurologist Dr. Suero as an outpatient for nerve conduction velocity and electromyography testings. You were given business card of Dr. Suero. In case you lost the contact information, please  refer to the following contact information of Dr. Suero.  Phone: [phone]  [facility] [address] [place] [address] [place] [address]  Secondary Diagnosis:	Abdominal wall fluid collections  Assessment and plan of treatment:	You were found to have 4.7 x 0.7 x 4.0 cm thick-walled fluid collection within the anterior abdominal and an umbilical hernia repair site. No significant surrounding inflammation on CT scan of your abdomen. You were seen by Dr Ho, surgeon who did not recommend surgical intervention.  Secondary Diagnosis:	DM (diabetes mellitus)  Goal:	Hemoglobin A1C<7.0  Assessment and plan of treatment:	Your hemoglobin A1C on admission was 7.9. Continue with home dose of metformin. You are advised a diabetic diet with consistent carbohydrates, exercise regularly. Measure your blood sugars on a regular basis. Follow up with your primary doctor.  Secondary Diagnosis:	Essential hypertension  Goal:	Blood pressure <130/80 mm Hg  Assessment and plan of treatment:	Continue with your medications as prescribed.  Secondary Diagnosis:	Depression  Assessment and plan of treatment:	Continue with your medications as prescribed.  Secondary Diagnosis:	Vitamin D deficiency  Goal:	Vitamin D levels 30-80  Assessment and plan of treatment:	Your vitamin D levels on admission were 22. Continue with vitamin D supplement as prescribed.  Secondary Diagnosis:	Abdominal pain  Assessment and plan of treatment:	You complained of burning abdominal pain during the latter half of your admission. Maalox and PPI were continued for symptomatically management. GI specialist Dr. Pierre performed EGD/Colonoscopy, which showed GERD, gastritis, and diverticula without diverticulitis, resected, sent for pathology. You are urged to follow up with Dr. Pierre for further management of his symptoms and pathology follow up. Dr. Pierre's contact information is listed below.

## 2018-12-25 NOTE — DISCHARGE NOTE ADULT - SECONDARY DIAGNOSIS.
DM (diabetes mellitus) Essential hypertension Depression Vitamin D deficiency Abdominal wall fluid collections Abdominal pain

## 2018-12-25 NOTE — CHART NOTE - NSCHARTNOTEFT_GEN_A_CORE
Paged by RN that patient's BP is 80/50. Patient is complaining of severe vertigo. Will give a bolus of normal saline and reassess.

## 2018-12-25 NOTE — DISCHARGE NOTE ADULT - PATIENT PORTAL LINK FT
You can access the [facility] Patient Portal, offered by [facility], by registering with the following website: [email or web address]

## 2018-12-25 NOTE — PROGRESS NOTE ADULT - SUBJECTIVE AND OBJECTIVE BOX
PGY1 Note discussed with supervising resident and primary attending.    Patient is a 78y old  Male who presents with a chief complaint of dizziness (25 Dec 2018 11:59)      INTERVAL HPI/OVERNIGHT EVENTS: no events overnight. Patient assessed bedside, reports feeling dizzy, has abdominal pain with diffuse tenderness.     MEDICATIONS  (STANDING):  atorvastatin 40 milliGRAM(s) Oral at bedtime  cholecalciferol 1000 Unit(s) Oral daily  escitalopram 20 milliGRAM(s) Oral daily  heparin  Injectable 5000 Unit(s) SubCutaneous every 12 hours  insulin lispro (HumaLOG) corrective regimen sliding scale   SubCutaneous Before meals and at bedtime  lisinopril 20 milliGRAM(s) Oral daily  pantoprazole    Tablet 40 milliGRAM(s) Oral before breakfast  risperiDONE   Tablet 0.5 milliGRAM(s) Oral daily    MEDICATIONS  (PRN):      Allergies    penicillin (Hives)    Intolerances        REVIEW OF SYSTEMS:  CONSTITUTIONAL: No fever, weight loss, or fatigue  RESPIRATORY: No cough, wheezing, chills or hemoptysis; No shortness of breath  CARDIOVASCULAR: No chest pain, palpitations, dizziness, or leg swelling  GASTROINTESTINAL:  abdominal  pain. No nausea, vomiting, or hematemesis; No diarrhea or constipation. No melena or hematochezia.  NEUROLOGICAL: No headaches, memory loss, loss of strength, numbness, or tremors  SKIN: No itching, burning, rashes, or lesions     Vital Signs Last 24 Hrs  T(C): 36.2 (25 Dec 2018 05:13), Max: 37.1 (25 Dec 2018 01:50)  T(F): 97.2 (25 Dec 2018 05:13), Max: 98.7 (25 Dec 2018 01:50)  HR: 81 (25 Dec 2018 11:45) (79 - 97)  BP: 119/59 (25 Dec 2018 11:45) (82/52 - 126/76)  BP(mean): --  RR: 18 (25 Dec 2018 05:13) (16 - 18)  SpO2: 95% (25 Dec 2018 05:13) (91% - 96%)    PHYSICAL EXAM:  GENERAL: NAD, well-groomed, well-developed, obese  HEAD:  Atraumatic, Normocephalic  EYES: EOMI, PERRLA, conjunctiva and sclera clear  NECK: Supple, No JVD, Normal thyroid  CHEST/LUNG: Clear to percussion bilaterally; No rales, rhonchi, wheezing, or rubs  HEART: Regular rate and rhythm; No murmurs, rubs, or gallops  ABDOMEN: Soft, diffuse tenderness, Nondistended; Bowel sounds present  NERVOUS SYSTEM:  Alert & Oriented X3, Good concentration; Motor Strength 5/5 B/L   EXTREMITIES:  2+ Peripheral Pulses, No clubbing, cyanosis, or edema      LABS:                        15.6   12.7  )-----------( 276      ( 24 Dec 2018 04:49 )             47.2         137  |  103  |  16  ----------------------------<  164<H>  3.8   |  25  |  0.94    Ca    8.7      24 Dec 2018 04:53  Phos  3.3       Mg     2.1         TPro  7.7  /  Alb  3.9  /  TBili  0.8  /  DBili  x   /  AST  14  /  ALT  31  /  AlkPhos  72      PT/INR - ( 23 Dec 2018 22:26 )   PT: 12.1 sec;   INR: 1.09 ratio         PTT - ( 23 Dec 2018 22:26 )  PTT:28.7 sec  Urinalysis Basic - ( 23 Dec 2018 23:08 )    Color: Yellow / Appearance: Clear / S.015 / pH: x  Gluc: x / Ketone: Negative  / Bili: Negative / Urobili: Negative   Blood: x / Protein: Negative / Nitrite: Negative   Leuk Esterase: Negative / RBC: x / WBC x   Sq Epi: x / Non Sq Epi: x / Bacteria: x      CAPILLARY BLOOD GLUCOSE      POCT Blood Glucose.: 225 mg/dL (25 Dec 2018 11:41)  POCT Blood Glucose.: 145 mg/dL (25 Dec 2018 08:20)  POCT Blood Glucose.: 145 mg/dL (24 Dec 2018 21:50)  POCT Blood Glucose.: 132 mg/dL (24 Dec 2018 17:21)      RADIOLOGY & ADDITIONAL TESTS:    Imaging Personally Reviewed:  [ x ] YES  [ ] NO    Consultant(s) Notes Reviewed:  [ x ] YES  [ ] NO

## 2018-12-25 NOTE — DISCHARGE NOTE ADULT - HOSPITAL COURSE
78M, from home, AAOx3, w/ PMHx T2DM, HTN, HLD, Depression who presents to the ED c/o dizziness and diaphoresis after taking Cefuroxime 500 mg at 4pm, in addition to face flushing and nausea. Patient mentions symptoms started 15 minutes after taking medication, afterwards, he stood up to grab some water, felt dizzy and fell to the floor. Denies chest pain, palpitations, weakness, LOC or any other complaint. Patient was diagnosed with sinusitis by ENT and was prescribed Cefuroxime x 7 days. Upon further questioning, patient endorses PCN allergy in the past.   Inpatient: patient was found to have 4.7 x 0.7 x 4.0 cm thick-walled fluid collection within the anterior abdominal and an umbilical hernia repair site. No significant surrounding inflammatio, on Ct a/p. Surgery was consulted for the same, rec medical management.   Patient likely had vasovagal episode from allergy to cephalosporin. Patient clinically stable for discharge as per attending. 78M, from home, AAOx3, w/ PMHx T2DM, HTN, HLD, Depression who presents to the ED c/o dizziness and diaphoresis after taking Cefuroxime 500 mg at 4pm, in addition to face flushing and nausea. Patient mentions symptoms started 15 minutes after taking medication, afterwards, he stood up to grab some water, felt dizzy and fell to the floor. Denies chest pain, palpitations, weakness, LOC or any other complaint. Patient was diagnosed with sinusitis by ENT and was prescribed Cefuroxime x 7 days. Upon further questioning, patient endorses PCN allergy in the past.   Inpatient: patient was found to have 4.7 x 0.7 x 4.0 cm thick-walled fluid collection within the anterior abdominal and an umbilical hernia repair site. No significant surrounding inflammatio, on Ct a/p. Surgery was consulted for the same, [facility] medical management.   Neuro Dr Forrest consulted for severe vertigo, [facility] MRI/MRA brain for brainstem vs cerebellar stroke. 78M, from home, AAOx3, w/ PMHx T2DM, HTN, HLD, Depression who presents to the ED c/o dizziness and diaphoresis after taking Cefuroxime 500 mg at 4pm, in addition to face flushing and nausea. Patient mentions symptoms started 15 minutes after taking medication, afterwards, he stood up to grab some water, felt dizzy and fell to the floor. Denies chest pain, palpitations, weakness, LOC or any other complaint. Patient was diagnosed with sinusitis by ENT and was prescribed Cefuroxime x 7 days. Upon further questioning, patient endorses PCN allergy in the past.   Inpatient: patient was found to have 4.7 x 0.7 x 4.0 cm thick-walled fluid collection within the anterior abdominal and an umbilical hernia repair site. No significant surrounding inflammatio, on Ct a/p. Surgery was consulted for the same, [facility] medical management.   Neuro Dr Forrest consulted for severe vertigo, [facility] MRI/MRA brain for brainstem vs cerebellar stroke: showed chronic microvascular ischemic changes and attenuation of distal left posterior cerebral artery. Patient has weakness of legs>arms, face and eyelids with diminishing reflexes; Dr Forrest rec  it could be because of diabetes mellitus, GBS and immune medicated neuropathy; has to be distingished from diabetic amyotrophy. Consider transfer to [facility] [place], operated by [facility] for NCV and begin IVIG. 78M, from home, AAOx3, w/ PMHx T2DM, HTN, HLD, Depression who presents to the ED c/o dizziness and diaphoresis after taking Cefuroxime 500 mg at 4pm, in addition to face flushing and nausea. Patient mentions symptoms started 15 minutes after taking medication, afterwards, he stood up to grab some water, felt dizzy and fell to the floor. Denies chest pain, palpitations, weakness, LOC or any other complaint. Patient was diagnosed with sinusitis by ENT and was prescribed Cefuroxime x 7 days. Upon further questioning, patient endorses PCN allergy in the past.   Inpatient: patient was found to have 4.7 x 0.7 x 4.0 cm thick-walled fluid collection within the anterior abdominal and an umbilical hernia repair site. No significant surrounding inflammatio, on Ct a/p. Surgery was consulted for the same, rec medical management.   Neuro Dr Forrest consulted for severe vertigo, [facility] MRI/MRA brain for brainstem vs cerebellar stroke: showed chronic microvascular ischemic changes and attenuation of distal left posterior cerebral artery. Patient has weakness of legs>arms, face and eyelids with diminishing reflexes; Dr Forrest rec  it could be because of diabetes mellitus, GBS and immune medicated neuropathy; has to be distinguished from diabetic amyotrophy: Pt started on ivig after Spinal tap, which showed: Mr. Jones is a 78M, from home, AAOx3, w/ PMHx T2DM, HTN, HLD, Depression who presents to the ED c/o dizziness and diaphoresis after taking Cefuroxime 500 mg at 4pm, in addition to face flushing and nausea. Patient mentions symptoms started 15 minutes after taking medication, afterwards, he stood up to grab some water, felt dizzy and fell to the floor. Denies chest pain, palpitations, weakness, LOC or any other complaint. Patient was diagnosed with sinusitis by ENT and was prescribed Cefuroxime x 7 days. Upon further questioning, patient endorses PCN allergy in the past.     Patient p/w dizziness, diaphoresis, nausea and face flushing after taking Cefuroxime. ECG: NSR VR@92bpm T1: negative. CT brain: no acute pathology. MRI/MRA: neg for acute infarct or aneurysm but chronic microvascular ischemic changes; Attenuation distal left posterior cerebral artery. Possible mastoiditis. Echo: grossly normal LV function, mild aortic stenosis, grade 1 DD. 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 Pt reported CP, SOB, itchiness during IVIG 2/5 treatment on 12/30. Pt tolerated IVIG injection 12/31 with premedications suggested by Dr. Forrest. Patient has completed 3/5 administrations. Further IVIG treatments were held due to the side effects patient had and the increased risk of MI with IVIG treatment. CP and SOB resolved shortly after the termination of treatment. Pt's  ENRIQUE, EOMI, and diplopia resolved with 3 days of IVIG treatment. Outpt NCV test recommended after discharge to differentiate between AIDP variant vs MG. CSF studies WNL except for trace RBC and mildly elevated lymphocytes. Paraneoplastic antibody profile was negative. Patient is to follow up with Dr. Suero as an outpatient for NCV and EMG testings. Patient completed Levaquin 500mg daily x 7 days for possible mastoiditis.    Patient presented with history of hernia repair 10yrs ago, denied abdominal pain at the beginning of presentation. CT A/P: 4.7 x 0.7 x 4.0 cm thick-walled fluid collection within the anterior. Abdominal and an umbilical hernia repair site. Surgery consulted recommended med management.     Patient c/o burning abdominal pain during the latter half of his admission. Maalox and PPI continued for symptomatically management. GI Dr. Pierre did EGD/Colonoscopy, which showed GERD gastritis, and diverticula without diverticulitis, resected, sent for pathology Pt advised to follow up with Dr. Pierre for further management of his symptoms and pathology follow up.

## 2018-12-25 NOTE — PROGRESS NOTE ADULT - PROBLEM SELECTOR PLAN 2
Patient w/ Hx of hernia repair 10yrs ago, denies abdominal pain  CT A/P: 4.7 x 0.7 x 4.0 cm thick-walled fluid collection within the anterior   abdominal and an umbilical hernia repair site. No significant surrounding   inflammation.  Surgery consulted: recc med management, will follow up

## 2018-12-25 NOTE — PROGRESS NOTE ADULT - SUBJECTIVE AND OBJECTIVE BOX
Patient is a 78y old  Male who presents with a chief complaint of dizziness (25 Dec 2018 12:48)    pt seen in icu [  ], reg med floor [ x  ], bed [ x ], chair at bedside [   ], a+o x3 [ x ], lethargic [  ],  nad [ x ]    pt still with c/o dizziness which he states prevents him form getting up        Allergies    penicillin (Hives)        Vitals    T(F): 97.2 (12-25-18 @ 05:13), Max: 98.7 (12-25-18 @ 01:50)  HR: 81 (12-25-18 @ 11:45) (79 - 97)  BP: 119/59 (12-25-18 @ 11:45) (82/52 - 126/76)  RR: 18 (12-25-18 @ 05:13) (16 - 18)  SpO2: 95% (12-25-18 @ 05:13) (91% - 96%)  Wt(kg): --  CAPILLARY BLOOD GLUCOSE      POCT Blood Glucose.: 225 mg/dL (25 Dec 2018 11:41)      Labs                          15.6   12.7  )-----------( 276      ( 24 Dec 2018 04:49 )             47.2       12-24    137  |  103  |  16  ----------------------------<  164<H>  3.8   |  25  |  0.94    Ca    8.7      24 Dec 2018 04:53  Phos  3.3     12-24  Mg     2.1     12-24    TPro  7.7  /  Alb  3.9  /  TBili  0.8  /  DBili  x   /  AST  14  /  ALT  31  /  AlkPhos  72  12-24      CARDIAC MARKERS ( 24 Dec 2018 08:39 )  <0.015 ng/mL / x     / 179 U/L / x     / 2.6 ng/mL  CARDIAC MARKERS ( 23 Dec 2018 22:26 )  <0.015 ng/mL / x     / x     / x     / x            .Urine Clean Catch (Midstream)  12-24 @ 10:58   <10,000 CFU/ml Normal Urogenital juliane present  --  --          Radiology Results    < from: CT Head No Cont (12.23.18 @ 23:08) >  IMPRESSION:     No acute intracranial hemorrhage, mass effect, or evidence of acute   vascular territorial infarction.      < end of copied text >        Meds    MEDICATIONS  (STANDING):  atorvastatin 40 milliGRAM(s) Oral at bedtime  cholecalciferol 1000 Unit(s) Oral daily  escitalopram 20 milliGRAM(s) Oral daily  heparin  Injectable 5000 Unit(s) SubCutaneous every 12 hours  insulin lispro (HumaLOG) corrective regimen sliding scale   SubCutaneous Before meals and at bedtime  lisinopril 20 milliGRAM(s) Oral daily  pantoprazole    Tablet 40 milliGRAM(s) Oral before breakfast  risperiDONE   Tablet 0.5 milliGRAM(s) Oral daily      MEDICATIONS  (PRN):      Physical Exam    Neuro :  no focal deficits  Respiratory: CTA B/L  CV: RRR, S1S2, no murmurs,   Abdominal: Soft, NT, ND +BS,  Extremities: No edema, + peripheral pulses    ASSESSMENT    Dizziness and giddiness   r/o cns path  vasovagal episode possibly 2nd acute drug reaction,   abdominal wall fluid collection,   h/o Anxiety  Depression  HLD (hyperlipidemia)  DM (diabetes mellitus)  HTN (hypertension)  No significant past surgical history      PLAN      ct head neg  neuro cons  start meclizine  check orthostatic bp q shift  gi cons  cont maalox prn  cont protonix,  surg cons noted  cont current meds

## 2018-12-26 LAB
GLUCOSE BLDC GLUCOMTR-MCNC: 153 MG/DL — HIGH (ref 70–99)
GLUCOSE BLDC GLUCOMTR-MCNC: 155 MG/DL — HIGH (ref 70–99)
GLUCOSE BLDC GLUCOMTR-MCNC: 162 MG/DL — HIGH (ref 70–99)
GLUCOSE BLDC GLUCOMTR-MCNC: 231 MG/DL — HIGH (ref 70–99)

## 2018-12-26 PROCEDURE — 70552 MRI BRAIN STEM W/DYE: CPT | Mod: 26

## 2018-12-26 PROCEDURE — 70544 MR ANGIOGRAPHY HEAD W/O DYE: CPT | Mod: 26,59

## 2018-12-26 PROCEDURE — 99232 SBSQ HOSP IP/OBS MODERATE 35: CPT

## 2018-12-26 RX ORDER — POLYETHYLENE GLYCOL 3350 17 G/17G
17 POWDER, FOR SOLUTION ORAL ONCE
Qty: 0 | Refills: 0 | Status: COMPLETED | OUTPATIENT
Start: 2018-12-26 | End: 2018-12-26

## 2018-12-26 RX ORDER — LISINOPRIL 2.5 MG/1
10 TABLET ORAL DAILY
Qty: 0 | Refills: 0 | Status: DISCONTINUED | OUTPATIENT
Start: 2018-12-26 | End: 2018-12-27

## 2018-12-26 RX ORDER — DOCUSATE SODIUM 100 MG
100 CAPSULE ORAL DAILY
Qty: 0 | Refills: 0 | Status: DISCONTINUED | OUTPATIENT
Start: 2018-12-26 | End: 2018-12-27

## 2018-12-26 RX ORDER — SENNA PLUS 8.6 MG/1
2 TABLET ORAL AT BEDTIME
Qty: 0 | Refills: 0 | Status: DISCONTINUED | OUTPATIENT
Start: 2018-12-26 | End: 2018-12-27

## 2018-12-26 RX ORDER — PREGABALIN 225 MG/1
1000 CAPSULE ORAL DAILY
Qty: 0 | Refills: 0 | Status: DISCONTINUED | OUTPATIENT
Start: 2018-12-26 | End: 2019-01-08

## 2018-12-26 RX ORDER — ERGOCALCIFEROL 1.25 MG/1
50000 CAPSULE ORAL
Qty: 0 | Refills: 0 | Status: DISCONTINUED | OUTPATIENT
Start: 2018-12-26 | End: 2019-01-08

## 2018-12-26 RX ADMIN — Medication 25 MILLIGRAM(S): at 05:32

## 2018-12-26 RX ADMIN — Medication 25 MILLIGRAM(S): at 21:44

## 2018-12-26 RX ADMIN — Medication 25 MILLIGRAM(S): at 14:17

## 2018-12-26 RX ADMIN — SENNA PLUS 2 TABLET(S): 8.6 TABLET ORAL at 21:52

## 2018-12-26 RX ADMIN — LISINOPRIL 20 MILLIGRAM(S): 2.5 TABLET ORAL at 05:32

## 2018-12-26 RX ADMIN — ESCITALOPRAM OXALATE 20 MILLIGRAM(S): 10 TABLET, FILM COATED ORAL at 14:17

## 2018-12-26 RX ADMIN — HEPARIN SODIUM 5000 UNIT(S): 5000 INJECTION INTRAVENOUS; SUBCUTANEOUS at 17:42

## 2018-12-26 RX ADMIN — Medication 1: at 17:41

## 2018-12-26 RX ADMIN — Medication 1: at 21:44

## 2018-12-26 RX ADMIN — POLYETHYLENE GLYCOL 3350 17 GRAM(S): 17 POWDER, FOR SOLUTION ORAL at 18:34

## 2018-12-26 RX ADMIN — ERGOCALCIFEROL 50000 UNIT(S): 1.25 CAPSULE ORAL at 14:17

## 2018-12-26 RX ADMIN — Medication 1: at 09:12

## 2018-12-26 RX ADMIN — PANTOPRAZOLE SODIUM 40 MILLIGRAM(S): 20 TABLET, DELAYED RELEASE ORAL at 05:32

## 2018-12-26 RX ADMIN — HEPARIN SODIUM 5000 UNIT(S): 5000 INJECTION INTRAVENOUS; SUBCUTANEOUS at 05:32

## 2018-12-26 RX ADMIN — Medication 2: at 14:15

## 2018-12-26 RX ADMIN — PREGABALIN 1000 MICROGRAM(S): 225 CAPSULE ORAL at 14:16

## 2018-12-26 RX ADMIN — RISPERIDONE 0.5 MILLIGRAM(S): 4 TABLET ORAL at 14:20

## 2018-12-26 RX ADMIN — ATORVASTATIN CALCIUM 40 MILLIGRAM(S): 80 TABLET, FILM COATED ORAL at 21:44

## 2018-12-26 RX ADMIN — Medication 30 MILLILITER(S): at 18:33

## 2018-12-26 NOTE — PROGRESS NOTE ADULT - SUBJECTIVE AND OBJECTIVE BOX
INTERVAL HPI/OVERNIGHT EVENTS:    HEALTH ISSUES - PROBLEM Dx:  Vitamin D deficiency: Vitamin D deficiency  Need for prophylactic measure: Need for prophylactic measure  Depression: Depression  HTN (hypertension): HTN (hypertension)  DM (diabetes mellitus): DM (diabetes mellitus)  Abdominal wall fluid collections: Abdominal wall fluid collections  Vertigo: Vertigo          MEDICATIONS  (STANDING):  atorvastatin 40 milliGRAM(s) Oral at bedtime  cyanocobalamin 1000 MICROGram(s) Oral daily  docusate sodium 100 milliGRAM(s) Oral daily  ergocalciferol 46796 Unit(s) Oral <User Schedule>  escitalopram 20 milliGRAM(s) Oral daily  heparin  Injectable 5000 Unit(s) SubCutaneous every 12 hours  insulin lispro (HumaLOG) corrective regimen sliding scale   SubCutaneous Before meals and at bedtime  lisinopril 10 milliGRAM(s) Oral daily  meclizine 25 milliGRAM(s) Oral every 8 hours  pantoprazole    Tablet 40 milliGRAM(s) Oral before breakfast  risperiDONE   Tablet 0.5 milliGRAM(s) Oral daily  senna 2 Tablet(s) Oral at bedtime    MEDICATIONS  (PRN):  aluminum hydroxide/magnesium hydroxide/simethicone Suspension 30 milliLiter(s) Oral every 6 hours PRN Dyspepsia      Allergies    penicillin (Hives)    Intolerances        REVIEW OF SYSTEMS      General:	    Skin/Breast:  	  Ophthalmologic:  	  ENMT:	    Respiratory and Thorax:  	  Cardiovascular:	    Gastrointestinal:	    Genitourinary:	    Musculoskeletal:	    Neurological:	    Psychiatric:	    Hematology/Lymphatics:	    Endocrine:	    Allergic/Immunologic:	    Vital Signs Last 24 Hrs  T(C): 36.7 (26 Dec 2018 15:21), Max: 36.7 (26 Dec 2018 15:21)  T(F): 98 (26 Dec 2018 15:21), Max: 98 (26 Dec 2018 15:21)  HR: 82 (26 Dec 2018 15:21) (80 - 87)  BP: 115/62 (26 Dec 2018 15:21) (101/58 - 127/59)  BP(mean): --  RR: 18 (26 Dec 2018 15:21) (18 - 18)  SpO2: 96% (26 Dec 2018 15:21) (59% - 97%)    PHYSICAL EXAM:  Constitutional: awake and alert.  HEENT: PERRLA, EOMI,   Neck: Supple.  Respiratory: Breath sounds are clear bilaterally  Cardiovascular: S1 and S2, regular / irregular rhythm  Gastrointestinal: soft, nontender  Extremities:  no edema  Vascular: Carotid Bruit - no  Musculoskeletal: no joint swelling/tenderness, no abnormal movements  Skin: No rashes    Neurological exam:  HF: A x O x 3. Appropriately interactive, normal affect. Speech fluent, No Aphasia or paraphasic errors. Naming /repetition intact   CN: JOE, EOMI variably weak in left conjugate gaze, VFF, facial sensation normal, no NLFD, tongue midline, Palate moves equally, SCM equal bilaterally  Motor: No pronator drift, Strength 4/5 in all upper ext, and 3/5 lower extremities normal bulk and tone, no tremor, rigidity or bradykinesia.    Sens: Intact to light touch / PP/ VS/ JS    Reflexes: Symmetric and normal . BJ 1+, BR 1+, KJ 1+, AJ0= downgoing toes b/l  Coord:  No FNFA, dysmetria, JUAN intact but demonstrates past pointing  Gait/Balance: cannot stand with feet together, cannot walk tandem          LABS:                RADIOLOGY & ADDITIONAL TESTS:  < from: MR Head w/ IV Cont (12.26.18 @ 12:56) >  EXAM:  MR BRAIN IC                            PROCEDURE DATE:  12/26/2018          INTERPRETATION:  CLINICAL STATEMENT: Rule out cerebellar stroke. Dizziness    TECHNIQUE: MRI of the brain was performed with and without gadolinium.    COMPARISON: CT head 12/23/2018.    FINDINGS:  There is no abnormal intraparenchymal or leptomeningeal enhancement.    There is mild diffuse parenchymal volume loss. There are T2 prolongation   signal abnormalities in the periventricular subcortical white matter   likely related to mild chronic microvascular ischemic changes.    There is no acute parenchymal hemorrhage, parenchymal mass, mass effect   or midline shift. There is no extra-axial fluid collection.  There is no   hydrocephalus.  There is no acute infarct.    Partial opacification mastoid air cells    IMPRESSION:  No parenchymal mass, acute intracranial hemorrhage or acute infarct.        MARY ANN COLEMAN M.D., ATTENDING RADIOLOGIST  This document has been electronically signed. Dec 26 2018 1:11PM    < end of copied text > INTERVAL HPI/OVERNIGHT EVENTS: Slightly better strength; no loss of appetite; still "dizzy" and weak and cannot walk.    HEALTH ISSUES - PROBLEM Dx:  Vitamin D deficiency: Vitamin D deficiency  Need for prophylactic measure: Need for prophylactic measure  Depression: Depression  HTN (hypertension): HTN (hypertension)  DM (diabetes mellitus): DM (diabetes mellitus)  Abdominal wall fluid collections: Abdominal wall fluid collections  Vertigo: Vertigo          MEDICATIONS  (STANDING):  atorvastatin 40 milliGRAM(s) Oral at bedtime  cyanocobalamin 1000 MICROGram(s) Oral daily  docusate sodium 100 milliGRAM(s) Oral daily  ergocalciferol 25899 Unit(s) Oral <User Schedule>  escitalopram 20 milliGRAM(s) Oral daily  heparin  Injectable 5000 Unit(s) SubCutaneous every 12 hours  insulin lispro (HumaLOG) corrective regimen sliding scale   SubCutaneous Before meals and at bedtime  lisinopril 10 milliGRAM(s) Oral daily  meclizine 25 milliGRAM(s) Oral every 8 hours  pantoprazole    Tablet 40 milliGRAM(s) Oral before breakfast  risperiDONE   Tablet 0.5 milliGRAM(s) Oral daily  senna 2 Tablet(s) Oral at bedtime    MEDICATIONS  (PRN):  aluminum hydroxide/magnesium hydroxide/simethicone Suspension 30 milliLiter(s) Oral every 6 hours PRN Dyspepsia      Allergies    penicillin (Hives)    Intolerances        REVIEW OF SYSTEMS  REVIEW OF SYSTEMS:    CONSTITUTIONAL: Complains of weakness, fatigue, malaise; denies fevers or chills,  weight change, appetite change  EYES: No visual changes; No double vision,  No vertigo, eye pain  Ears: no otalgia, no otorhea, no hearing loss, tinnitus  Nose: no epistaxis, rhinorrhea, post-discharge, sinus pressure  Throat: no throat pain, no oral lesions, tooth pain   NECK: No pain or stiffness  RESPIRATORY: No cough (productive or dry), wheezing, hemoptysis; admits shortness of breath, denies orthnopnea, PND, VELASCO, snoring,  CARDIOVASCULAR: No chest pain or palpitations, no leg edema, no claudication    GASTROINTESTINAL: No abdominal or epigastric pain. No nausea, vomiting, or hematemesis; No diarrhea or constipation. No melena or hematochezia.  GENITOURINARY: No dysuria, frequency, urgency or hematuria, no pelvic pain, urinary incontinence, urgency  Muscloskeletal: no joints or muscle pain, no swelling in joints or muscles  NEUROLOGICAL: Admits numbness and weakness, dizziness, vertigo, syncope, ataxia; denies headache, memory loss, seizures;   SKIN: No pruritis, rashes, lesions or new moles  Psych: No anxiety, sadness, insomnia, suicide thoughts  Endocrine: No Heat or Cold intolerance, polydipsia, polyphagia  Heme/Lymph: no LN enlargement, no easy bruising or bleeding         Vital Signs Last 24 Hrs  T(C): 36.7 (26 Dec 2018 15:21), Max: 36.7 (26 Dec 2018 15:21)  T(F): 98 (26 Dec 2018 15:21), Max: 98 (26 Dec 2018 15:21)  HR: 82 (26 Dec 2018 15:21) (80 - 87)  BP: 115/62 (26 Dec 2018 15:21) (101/58 - 127/59)  BP(mean): --  RR: 18 (26 Dec 2018 15:21) (18 - 18)  SpO2: 96% (26 Dec 2018 15:21) (59% - 97%)    PHYSICAL EXAM:  Constitutional: awake and alert.  HEENT: PERRLA, EOMI,   Neck: Supple.  Respiratory: Breath sounds are clear bilaterally  Cardiovascular: S1 and S2, regular / irregular rhythm  Gastrointestinal: soft, nontender  Extremities:  no edema  Vascular: Carotid Bruit - no  Musculoskeletal: no joint swelling/tenderness, no abnormal movements  Skin: No rashes    Neurological exam:  HF: A x O x 3. Appropriately interactive, normal affect. Speech fluent, No Aphasia or paraphasic errors. Naming /repetition intact   CN: JOE, EOMI variably weak in left conjugate gaze, VFF, facial sensation normal, no NLFD, tongue midline, Palate moves equally, SCM equal bilaterally  Motor: No pronator drift, Strength 4/5 in all upper ext, and 3/5 lower extremities normal bulk and tone, no tremor, rigidity or bradykinesia.    Sens: Intact to light touch / PP/ VS/ JS    Reflexes: Symmetric and normal . BJ 1+, BR 1+, KJ 1+, AJ0= downgoing toes b/l  Coord:  No FNFA, dysmetria, JUAN intact but demonstrates past pointing  Gait/Balance: cannot stand with feet together, cannot walk tandem          LABS:        RADIOLOGY & ADDITIONAL TESTS:  < from: MR Head w/ IV Cont (12.26.18 @ 12:56) >  EXAM:  MR BRAIN IC                            PROCEDURE DATE:  12/26/2018          INTERPRETATION:  CLINICAL STATEMENT: Rule out cerebellar stroke. Dizziness    TECHNIQUE: MRI of the brain was performed with and without gadolinium.    COMPARISON: CT head 12/23/2018.    FINDINGS:  There is no abnormal intraparenchymal or leptomeningeal enhancement.    There is mild diffuse parenchymal volume loss. There are T2 prolongation   signal abnormalities in the periventricular subcortical white matter   likely related to mild chronic microvascular ischemic changes.    There is no acute parenchymal hemorrhage, parenchymal mass, mass effect   or midline shift. There is no extra-axial fluid collection.  There is no   hydrocephalus.  There is no acute infarct.    Partial opacification mastoid air cells    IMPRESSION:  No parenchymal mass, acute intracranial hemorrhage or acute infarct.        MARY ANN COLEMAN M.D., ATTENDING RADIOLOGIST  This document has been electronically signed. Dec 26 2018 1:11PM    < end of copied text >

## 2018-12-26 NOTE — PROGRESS NOTE ADULT - SUBJECTIVE AND OBJECTIVE BOX
PGY1 Note discussed with supervising resident and primary attending.    Patient is a 78y old  Male who presents with a chief complaint of dizziness (26 Dec 2018 11:20)      INTERVAL HPI/OVERNIGHT EVENTS: Patient assessed bedside, continues to have dizziness.     MEDICATIONS  (STANDING):  atorvastatin 40 milliGRAM(s) Oral at bedtime  cyanocobalamin 1000 MICROGram(s) Oral daily  ergocalciferol 93051 Unit(s) Oral <User Schedule>  escitalopram 20 milliGRAM(s) Oral daily  heparin  Injectable 5000 Unit(s) SubCutaneous every 12 hours  insulin lispro (HumaLOG) corrective regimen sliding scale   SubCutaneous Before meals and at bedtime  lisinopril 10 milliGRAM(s) Oral daily  meclizine 25 milliGRAM(s) Oral every 8 hours  pantoprazole    Tablet 40 milliGRAM(s) Oral before breakfast  risperiDONE   Tablet 0.5 milliGRAM(s) Oral daily    MEDICATIONS  (PRN):  aluminum hydroxide/magnesium hydroxide/simethicone Suspension 30 milliLiter(s) Oral every 6 hours PRN Dyspepsia      Allergies    penicillin (Hives)    Intolerances        REVIEW OF SYSTEMS:  CONSTITUTIONAL: No fever, weight loss, or fatigue  RESPIRATORY: No cough, wheezing, chills or hemoptysis; No shortness of breath  CARDIOVASCULAR: No chest pain, palpitations, dizziness, or leg swelling  GASTROINTESTINAL: Abdominal pain. No nausea, vomiting, or hematemesis; No diarrhea or constipation. No melena or hematochezia.  NEUROLOGICAL: Vertigo; No headaches, memory loss, loss of strength, numbness, or tremors  SKIN: No itching, burning, rashes, or lesions     Vital Signs Last 24 Hrs  T(C): 36.6 (26 Dec 2018 11:37), Max: 36.6 (26 Dec 2018 11:37)  T(F): 97.9 (26 Dec 2018 11:37), Max: 97.9 (26 Dec 2018 11:37)  HR: 80 (26 Dec 2018 11:37) (80 - 87)  BP: 113/69 (26 Dec 2018 11:37) (101/58 - 127/59)  BP(mean): --  RR: 18 (26 Dec 2018 11:37) (18 - 18)  SpO2: 95% (26 Dec 2018 11:37) (59% - 97%)    PHYSICAL EXAM:  GENERAL: NAD, well-groomed, well-developed  HEAD:  Atraumatic, Normocephalic  EYES: EOMI, PERRLA, conjunctiva and sclera clear  NECK: Supple, No JVD, Normal thyroid  CHEST/LUNG: Clear to percussion bilaterally; No rales, rhonchi, wheezing, or rubs  HEART: Regular rate and rhythm; No murmurs, rubs, or gallops  ABDOMEN: Soft, Nontender, Nondistended; Bowel sounds present  NERVOUS SYSTEM:  Alert & Oriented X3, no focal deficits  EXTREMITIES:  2+ Peripheral Pulses, No clubbing, cyanosis, or edema      LABS:              CAPILLARY BLOOD GLUCOSE      POCT Blood Glucose.: 231 mg/dL (26 Dec 2018 13:19)  POCT Blood Glucose.: 162 mg/dL (26 Dec 2018 08:19)  POCT Blood Glucose.: 178 mg/dL (25 Dec 2018 21:39)  POCT Blood Glucose.: 139 mg/dL (25 Dec 2018 17:16)      RADIOLOGY & ADDITIONAL TESTS: < from: MR Head w/ IV Cont (12.26.18 @ 12:56) >    EXAM:  MR BRAIN IC                            PROCEDURE DATE:  12/26/2018          INTERPRETATION:  CLINICAL STATEMENT: Rule out cerebellar stroke. Dizziness    TECHNIQUE: MRI of the brain was performed with and without gadolinium.    COMPARISON: CT head 12/23/2018.    FINDINGS:  There is no abnormal intraparenchymal or leptomeningeal enhancement.    There is mild diffuse parenchymal volume loss. There are T2 prolongation   signal abnormalities in the periventricular subcortical white matter   likely related to mild chronic microvascular ischemic changes.    There is no acute parenchymal hemorrhage, parenchymal mass, mass effect   or midline shift. There is no extra-axial fluid collection.  There is no   hydrocephalus.  There is no acute infarct.    Partial opacification mastoid air cells    < end of copied text >    < from: MR Angio Head No Cont (12.26.18 @ 12:55) >    EXAM:  MR ANGIO BRAIN                            PROCEDURE DATE:  12/26/2018          INTERPRETATION:  CLINICAL STATEMENT: Evaluate for stenosis. CVA    TECHNIQUE: MRA of the head was performed without gadolinium. 3-D MIP   images were obtained.    COMPARISON: None.    FINDINGS:  The proximal anterior, middle and posterior cerebral arteries are patent.   Attenuation distal left posterior cerebral arteries noted.    The intracranial vertebral and basilar arteries are patent. Dominant left   vertebral artery.    There is no MR evidence of intracranial aneurysm. Infundibulum at the   origin of fetal left posterior cerebral artery.    IMPRESSION:  Attenuation distal left posterior cerebral artery.    < end of copied text >      Imaging Personally Reviewed:  [ x ] YES  [ ] NO    Consultant(s) Notes Reviewed:  [ x ] YES  [ ] NO

## 2018-12-26 NOTE — CONSULT NOTE ADULT - ASSESSMENT
78 yr old Male , from home, AAOx3, w/ PMHx T2DM, HTN, HLD, Depression who presents to the ED c/o dizziness ,nausea and vomiting.  1.Tele monitoring.  2.Neurology eval noted-await MRI and MRA.  3.HTN-dec lisnopril 10mg qd.  4.Orthostatic BP q shift.  5.DM-insulin.  6.Cont asa,statin.  7.Echocardiogram.  8.Gi and DVT prophylaxis.

## 2018-12-26 NOTE — PROGRESS NOTE ADULT - PROBLEM SELECTOR PLAN 1
Patient p/w dizziness, diaphoresis, nausea and face flushing after taking Cefuroxime. Symptoms most likely due to allergic reaction.   ECG: NSR VR@92bpm T1: negative  CT brain: no acute pathology  MRI/MRA: chronic microvascular ischemic changes; Attenuation distal left posterior cerebral artery.   Neuro Dr Forrest   Cardio Dr Mcdowell: decrease lisinopril to 10mg Patient p/w dizziness, diaphoresis, nausea and face flushing after taking Cefuroxime. Symptoms most likely due to allergic reaction.   ECG: NSR VR@92bpm T1: negative  CT brain: no acute pathology  MRI/MRA: chronic microvascular ischemic changes; Attenuation distal left posterior cerebral artery.   Neuro Dr Forrest   Cardio Dr Mcdowell: tele, orthostats q shift, echo, decrease lisinopril to 10mg

## 2018-12-26 NOTE — PROGRESS NOTE ADULT - ASSESSMENT
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 Consider transfer to [facility], [place], operated by [facility] for NCV and begin IVIG

## 2018-12-26 NOTE — PROGRESS NOTE ADULT - SUBJECTIVE AND OBJECTIVE BOX
Patient is a 78y old  Male who presents with a chief complaint of dizziness (25 Dec 2018 16:43)    pt seen in icu [  ], reg med floor [ x  ], bed [ x ], chair at bedside [   ], a+o x3 [ x ], lethargic [  ],  nad [ x ]    pt still with c/o dizziness which he states prevents him form getting up      Allergies    penicillin (Hives)        Vitals    T(F): 97 (12-26-18 @ 05:21), Max: 98 (12-25-18 @ 14:44)  HR: 83 (12-26-18 @ 05:21) (80 - 87)  BP: 117/59 (12-26-18 @ 05:21) (101/58 - 127/59)  RR: 18 (12-26-18 @ 05:21) (17 - 18)  SpO2: 59% (12-26-18 @ 05:21) (59% - 97%)  Wt(kg): --  CAPILLARY BLOOD GLUCOSE      POCT Blood Glucose.: 162 mg/dL (26 Dec 2018 08:19)      Labs                      .Urine Clean Catch (Midstream)  12-24 @ 10:58   <10,000 CFU/ml Normal Urogenital juliane present  --  --          Radiology Results      Meds    MEDICATIONS  (STANDING):  atorvastatin 40 milliGRAM(s) Oral at bedtime  cholecalciferol 1000 Unit(s) Oral daily  escitalopram 20 milliGRAM(s) Oral daily  heparin  Injectable 5000 Unit(s) SubCutaneous every 12 hours  insulin lispro (HumaLOG) corrective regimen sliding scale   SubCutaneous Before meals and at bedtime  lisinopril 20 milliGRAM(s) Oral daily  meclizine 25 milliGRAM(s) Oral every 8 hours  pantoprazole    Tablet 40 milliGRAM(s) Oral before breakfast  risperiDONE   Tablet 0.5 milliGRAM(s) Oral daily      MEDICATIONS  (PRN):  aluminum hydroxide/magnesium hydroxide/simethicone Suspension 30 milliLiter(s) Oral every 6 hours PRN Dyspepsia      Physical Exam      Neuro :  no focal deficits  Respiratory: CTA B/L  CV: RRR, S1S2, no murmurs,   Abdominal: Soft, NT, ND +BS,  Extremities: No edema, + peripheral pulses    ASSESSMENT    Dizziness and giddiness   r/o cns path  vasovagal episode possibly 2nd acute drug reaction,   abdominal wall fluid collection,   h/o Anxiety  Depression  HLD (hyperlipidemia)  DM (diabetes mellitus)  HTN (hypertension)  No significant past surgical history      PLAN      ct head neg  neuro cons noted  f/u MRI brain and MRA head to eval for  brain stem infarct - Parinaud's syndrome" or cerebellar infarct  f/u acetylcholine receptor antibody to eval for myasthenia gravis  cont aspirin atorvastatin  cont meclizine  cardio cons  orthostatic bp neg  gi cons  cont maalox prn  cont protonix,  surg cons noted  cont current meds

## 2018-12-26 NOTE — CONSULT NOTE ADULT - SUBJECTIVE AND OBJECTIVE BOX
CHIEF COMPLAINT:Patient is a 78y old  Male who presents with a chief complaint of dizziness.      HPI:  78 yr old Male , from home, AAOx3, w/ PMHx T2DM, HTN, HLD, Depression who presents to the ED c/o dizziness and diaphoresis after taking Cefuroxime 500 mg at 4pm, in addition to face flushing and nausea. Patient mentions symptoms started 15 minutes after taking medication, afterwards, he stood up to grab some water, felt dizzy and fell to the floor. Denies chest pain, palpitations, weakness, LOC or any other complaint. Patient was diagnosed with sinusitis by ENT and was prescribed Cefuroxime x 7 days. Upon further questioning, patient endorses PCN allergy in the past. (24 Dec 2018 04:37)      PAST MEDICAL & SURGICAL HISTORY:  Anxiety  Depression  HLD (hyperlipidemia)  DM (diabetes mellitus)  HTN (hypertension)        MEDICATIONS  (STANDING):  atorvastatin 40 milliGRAM(s) Oral at bedtime  cholecalciferol 1000 Unit(s) Oral daily  cyanocobalamin 1000 MICROGram(s) Oral daily  ergocalciferol 42716 Unit(s) Oral <User Schedule>  escitalopram 20 milliGRAM(s) Oral daily  heparin  Injectable 5000 Unit(s) SubCutaneous every 12 hours  insulin lispro (HumaLOG) corrective regimen sliding scale   SubCutaneous Before meals and at bedtime  lisinopril 10 milliGRAM(s) Oral daily  meclizine 25 milliGRAM(s) Oral every 8 hours  pantoprazole    Tablet 40 milliGRAM(s) Oral before breakfast  risperiDONE   Tablet 0.5 milliGRAM(s) Oral daily    MEDICATIONS  (PRN):  aluminum hydroxide/magnesium hydroxide/simethicone Suspension 30 milliLiter(s) Oral every 6 hours PRN Dyspepsia      FAMILY HISTORY: Family history of heart disease (Father)      SOCIAL HISTORY:    [x ] Non-smoker    [x ] Alcohol-denies    Allergies    penicillin (Hives)    Intolerances    	    REVIEW OF SYSTEMS:  CONSTITUTIONAL: No fever, weight loss, or fatigue  EYES: No eye pain, visual disturbances, or discharge  ENT:  No difficulty hearing, tinnitus, vertigo; No sinus or throat pain  NECK: No pain or stiffness  RESPIRATORY: No cough, wheezing, chills or hemoptysis; No Shortness of Breath  CARDIOVASCULAR: No chest pain, palpitations, passing out, + dizziness  GASTROINTESTINAL: No abdominal or epigastric pain. + nausea, +vomiting; No diarrhea or constipation. No melena or hematochezia.  GENITOURINARY: No dysuria, frequency, hematuria, or incontinence  NEUROLOGICAL: No headaches, memory loss, loss of strength, numbness, or tremors  SKIN: No itching, burning, rashes, or lesions   LYMPH Nodes: No enlarged glands  ENDOCRINE: No heat or cold intolerance; No hair loss  MUSCULOSKELETAL: No joint pain or swelling; No muscle, back, or extremity pain  PSYCHIATRIC: No depression, anxiety, mood swings, or difficulty sleeping  HEME/LYMPH: No easy bruising, or bleeding gums  ALLERGY AND IMMUNOLOGIC: No hives or eczema	      PHYSICAL EXAM:  T(C): 36.1 (12-26-18 @ 05:21), Max: 36.7 (12-25-18 @ 14:44)  HR: 83 (12-26-18 @ 05:21) (80 - 87)  BP: 117/59 (12-26-18 @ 05:21) (101/58 - 127/59)  RR: 18 (12-26-18 @ 05:21) (17 - 18)  SpO2: 59% (12-26-18 @ 05:21) (59% - 97%)    I&O's Summary    25 Dec 2018 07:01  -  26 Dec 2018 07:00  --------------------------------------------------------  IN: 0 mL / OUT: 1050 mL / NET: -1050 mL        Appearance: Normal	  HEENT:   Normal oral mucosa, PERRL, EOMI	  Lymphatic: No lymphadenopathy  Cardiovascular: Normal S1 S2, No JVD, No murmurs, No edema  Respiratory: Lungs clear to auscultation	  Psychiatry: A & O x 3, Mood & affect appropriate  Gastrointestinal:  Soft, Non-tender, + BS	  Skin: No rashes, No ecchymoses, No cyanosis	  Neurologic: Non-focal  Extremities: Normal range of motion, No clubbing, cyanosis or edema  Vascular: Peripheral pulses palpable 2+ bilaterally    	    ECG:  	Normal sinus rhythm  Nonspecific ST and T wave abnormality      	  LABS:	 	      Vitamin B12, Serum (12.24.18 @ 10:48)    Vitamin B12, Serum: 366: Note: Reference Range Change on 12/18/2017. pg/mL    Vitamin D, 25-Hydroxy (12.24.18 @ 10:48)    Vitamin D, 25-Hydroxy: 22.6: 30 - 80 ng/mL                                        Optimum Levels  (Reference range)  > 80 ng/mL                                             Toxicity possible  20 - 29 ng/mL                                         Insufficiency  10 - 19 ng/mL                                 Mild to Moderate  Deficiency  < 10 ng/mL                                             Severe Deficiency  Optimum levels for 25-Hydroxy vitamin D are 30 ng/mL and above based on  the Endocrine Society guidelines 2011. However, there is a lack of  consensus on this and the [place] [employer] recommends 20 ng/mL and  above as optimum levels. Vitamin D results may vary depending on the  method of analysis. The current Peatix XL chemiluminescent immunoassay  methodmeasures both D2 and D3 and was introduced in 2010. ng/mL    Lipid Profile (12.24.18 @ 04:53)    Total Cholesterol/HDL Ratio Measurement: 4.7 RATIO    Cholesterol, Serum: 200 mg/dL    Triglycerides, Serum: 154 mg/dL    HDL Cholesterol, Serum: 43: HDL Levels >/= 60 mg/dL are considered beneficial and a "negative" risk  factor.  Effective 08/15/2018: New reference range and interpretive comment. mg/dL    Direct LDL: 126: LDL Cholesterol (mg/dL) --- Interpretive Comment (for adults 18 and over)  Optimal LDL Level may vary based on clinical situation  Below 70                   Ideal for people at very high risk of heart  disease  Below 100                  Ideal for people at risk of heart disease  100 - 129                    [place]  130 - 159                    Borderline high  160 - 189                    High  190 and Above           Very high mg/dL    Hemoglobin A1C, Whole Blood (12.24.18 @ 10:48)    Hemoglobin A1C, Whole Blood: 7.9: Method: Immunoassay       Reference Range                4.0-5.6%       High risk (prediabetic)        5.7-6.4%       Diabetic, diagnostic             >=6.5%       ADA diabetic treatment goal       <7.0%  The Hemoglobin A1c testing is NGSP-certified.Reference ranges are based  upon the 2010 recommendations of  the American Diabetes Association.  Interpretation may vary for children  and adolescents. %        EXAM:  CT ANGIO ABD PELV (W)AW IC                          EXAM:  CT ANGIO CHEST (W)AW IC                            PROCEDURE DATE:  12/23/2018          INTERPRETATION:  CLINICAL INFORMATION: Chest and abdominal pain. Evaluate   for dissection.    COMPARISON: None available.    PROCEDURE:   CT of the Chest, Abdomen and Pelvis was performed with and without   intravenous contrast.   Precontrast imaging was performed through the chest followed by arterial   phase imaging of the chest, abdomen and pelvis in the arterial phase.  Intravenous contrast: 90 ml Omnipaque 350. 10 ml discarded.  Oral contrast:None.  Sagittal and coronal reformats were performed. MIP reformats were   obtained.    FINDINGS:    CHEST:     LUNGS AND LARGE AIRWAYS: Patentcentral airways. 8 mm triangular right   lower lobe fissural nodule (series 2, image 44), likely represents an   intrapulmonary lymph node. Nonspecific bilateral reticular airspace   opacities.  PLEURA: No pleural effusion.  VESSELS: Atherosclerosis of the thoracic aorta without evidence of   intramural hematoma, aneurysm, or acute dissection. Normal caliber main   pulmonary artery. No central pulmonary embolus.  HEART: Heart size is normal. No pericardial effusion. Coronary artery   calcifications and/or stents.  MEDIASTINUM AND NHUNG: No lymphadenopathy.  CHEST WALL AND LOWER NECK: Within normal limits.    ABDOMEN AND PELVIS:    LIVER: Within normal limits.  BILE DUCTS: Normal caliber.  GALLBLADDER: Cholelithiasis.  SPLEEN: Within normal limits.  PANCREAS: Within normal limits.  ADRENALS: Within normal limits.  KIDNEYS/URETERS: Within normal limits.    BLADDER: Underdistended.  REPRODUCTIVE ORGANS: The prostate is within normal limits.    BOWEL: No bowel obstruction. Sigmoid and scattered colonic diverticulosis   without diverticulitis. Appendix normal.  PERITONEUM: No ascites.  VESSELS:  Atherosclerosis of the abdominal aorta and its branch vessels.   No aneurysm or dissection.  RETROPERITONEUM: No lymphadenopathy.    ABDOMINAL WALL: Thick-walled thin 4.7 x 0.7 x 4.0 cm fluid collection at   the site of a umbilical hernia repair.  BONES: Multilevel degenerative changes of the spine.     IMPRESSION:    No aortic dissection.    8 mm triangular right lower lobe fissural nodule, likely represents a   intrapulmonary lymph node, however 6-12 month follow-up with noncontrast   chest CT may be obtained to demonstrate stability.    4.7 x 0.7 x 4.0 cm thick-walled fluid collection within the anterior   abdominal and an umbilical hernia repair site. No significant surrounding   inflammation.        EXAM:  CT BRAIN                            PROCEDURE DATE:  12/23/2018          INTERPRETATION:  CLINICAL INFORMATION: Vertigo.    COMPARISON: None available.    PROCEDURE:   Noncontrast CT of the Head was performed from the skull base to the   vertex. Coronal and Sagittal reformats were obtained.    FINDINGS:    There is no acute intracranial hemorrhage, mass effect, midline shift,   extra-axial collection, hydrocephalus, or evidence of acute vascular   territorial infarction. Mild patchy hypodensities within the   periventricular and subcortical white matter, although nonspecific,   likely reflect chronic microvascular disease.    The visualized paranasal sinuses and mastoid air cells are clear. Status   post bilateral cataract surgery. Visualized osseous structures are intact.    IMPRESSION:     No acute intracranial hemorrhage, mass effect, or evidence of acute   vascular territorial infarction.    If clinical symptoms persist or worsen, more sensitive evaluation with   brain MRI may be obtained, if no contraindications exist.

## 2018-12-27 DIAGNOSIS — R10.9 UNSPECIFIED ABDOMINAL PAIN: ICD-10-CM

## 2018-12-27 LAB
GLUCOSE BLDC GLUCOMTR-MCNC: 143 MG/DL — HIGH (ref 70–99)
GLUCOSE BLDC GLUCOMTR-MCNC: 149 MG/DL — HIGH (ref 70–99)
GLUCOSE BLDC GLUCOMTR-MCNC: 196 MG/DL — HIGH (ref 70–99)
GLUCOSE BLDC GLUCOMTR-MCNC: 199 MG/DL — HIGH (ref 70–99)

## 2018-12-27 PROCEDURE — 93306 TTE W/DOPPLER COMPLETE: CPT | Mod: 26

## 2018-12-27 PROCEDURE — 95819 EEG AWAKE AND ASLEEP: CPT | Mod: 26

## 2018-12-27 PROCEDURE — 99222 1ST HOSP IP/OBS MODERATE 55: CPT

## 2018-12-27 RX ORDER — LISINOPRIL 2.5 MG/1
5 TABLET ORAL DAILY
Qty: 0 | Refills: 0 | Status: DISCONTINUED | OUTPATIENT
Start: 2018-12-27 | End: 2018-12-28

## 2018-12-27 RX ORDER — LACTULOSE 10 G/15ML
20 SOLUTION ORAL ONCE
Qty: 0 | Refills: 0 | Status: COMPLETED | OUTPATIENT
Start: 2018-12-27 | End: 2018-12-27

## 2018-12-27 RX ADMIN — LISINOPRIL 10 MILLIGRAM(S): 2.5 TABLET ORAL at 05:45

## 2018-12-27 RX ADMIN — HEPARIN SODIUM 5000 UNIT(S): 5000 INJECTION INTRAVENOUS; SUBCUTANEOUS at 05:45

## 2018-12-27 RX ADMIN — Medication 25 MILLIGRAM(S): at 13:20

## 2018-12-27 RX ADMIN — RISPERIDONE 0.5 MILLIGRAM(S): 4 TABLET ORAL at 13:20

## 2018-12-27 RX ADMIN — Medication 25 MILLIGRAM(S): at 21:40

## 2018-12-27 RX ADMIN — Medication 30 MILLILITER(S): at 05:48

## 2018-12-27 RX ADMIN — ATORVASTATIN CALCIUM 40 MILLIGRAM(S): 80 TABLET, FILM COATED ORAL at 21:40

## 2018-12-27 RX ADMIN — PANTOPRAZOLE SODIUM 40 MILLIGRAM(S): 20 TABLET, DELAYED RELEASE ORAL at 05:45

## 2018-12-27 RX ADMIN — Medication 100 MILLIGRAM(S): at 13:20

## 2018-12-27 RX ADMIN — Medication 1: at 17:44

## 2018-12-27 RX ADMIN — Medication 1: at 13:20

## 2018-12-27 RX ADMIN — ESCITALOPRAM OXALATE 20 MILLIGRAM(S): 10 TABLET, FILM COATED ORAL at 13:19

## 2018-12-27 RX ADMIN — PREGABALIN 1000 MICROGRAM(S): 225 CAPSULE ORAL at 13:20

## 2018-12-27 RX ADMIN — LACTULOSE 20 GRAM(S): 10 SOLUTION ORAL at 13:21

## 2018-12-27 RX ADMIN — Medication 25 MILLIGRAM(S): at 05:45

## 2018-12-27 NOTE — CONSULT NOTE ADULT - PROBLEM SELECTOR RECOMMENDATION 9
Epigastric burning; ? gastritis/ulcer  c/w maalox and PPI  avoid NSAID's  bowel regimen Epigastric burning; ? gastritis/ulcer  c/w maalox prn and PPI daily  avoid NSAID's  bowel regimen  eventual EGD/Colonoscopy

## 2018-12-27 NOTE — PROGRESS NOTE ADULT - SUBJECTIVE AND OBJECTIVE BOX
PGY1 Note discussed with supervising resident and primary attending.    Patient is a 78y old  Male who presents with a chief complaint of dizziness (27 Dec 2018 13:04)      INTERVAL HPI/OVERNIGHT EVENTS: Patient's MRI negative for stroke, continues to have vertigo with weakness. Paraneoplastic antibody profile ordered, planning for spinal tap tomorrow. Heparin for dvt ppx on hold.     MEDICATIONS  (STANDING):  atorvastatin 40 milliGRAM(s) Oral at bedtime  cyanocobalamin 1000 MICROGram(s) Oral daily  docusate sodium 100 milliGRAM(s) Oral daily  ergocalciferol 67519 Unit(s) Oral <User Schedule>  escitalopram 20 milliGRAM(s) Oral daily  insulin lispro (HumaLOG) corrective regimen sliding scale   SubCutaneous Before meals and at bedtime  lisinopril 5 milliGRAM(s) Oral daily  meclizine 25 milliGRAM(s) Oral every 8 hours  pantoprazole    Tablet 40 milliGRAM(s) Oral before breakfast  risperiDONE   Tablet 0.5 milliGRAM(s) Oral daily  senna 2 Tablet(s) Oral at bedtime    MEDICATIONS  (PRN):  aluminum hydroxide/magnesium hydroxide/simethicone Suspension 30 milliLiter(s) Oral every 6 hours PRN Dyspepsia      Allergies    penicillin (Hives)    Intolerances        REVIEW OF SYSTEMS:  CONSTITUTIONAL: No fever, weight loss, or fatigue  RESPIRATORY: No cough, wheezing, chills or hemoptysis; No shortness of breath  CARDIOVASCULAR: No chest pain, palpitations, dizziness, or leg swelling  GASTROINTESTINAL: Diffuse abdominal pain; constipation, no nausea/vomiting  NEUROLOGICAL: Vertigo, weakness of legs, diplopia  SKIN: No itching, burning, rashes, or lesions     Vital Signs Last 24 Hrs  T(C): 36.6 (27 Dec 2018 15:08), Max: 36.8 (26 Dec 2018 21:32)  T(F): 97.9 (27 Dec 2018 15:08), Max: 98.2 (26 Dec 2018 21:32)  HR: 73 (27 Dec 2018 15:08) (73 - 87)  BP: 113/56 (27 Dec 2018 15:08) (113/56 - 115/40)  BP(mean): --  RR: 18 (27 Dec 2018 15:08) (18 - 18)  SpO2: 95% (27 Dec 2018 15:08) (95% - 95%)    PHYSICAL EXAM:  GENERAL: Well-groomed, well-developed; ptosis of left eye   HEAD:  Atraumatic, Normocephalic  EYES: EOMI, PERRLA, conjunctiva and sclera clear  NECK: Supple, No JVD, Normal thyroid  CHEST/LUNG: Clear to percussion bilaterally; No rales, rhonchi, wheezing, or rubs  HEART: Regular rate and rhythm; No murmurs, rubs, or gallops  ABDOMEN: Tender, distended; Bowel sounds present  NERVOUS SYSTEM:  Alert & Oriented X3, Good concentration; BLE motor strength 3/5  EXTREMITIES:  2+ Peripheral Pulses, No clubbing, cyanosis, or edema      LABS:              CAPILLARY BLOOD GLUCOSE      POCT Blood Glucose.: 199 mg/dL (27 Dec 2018 12:33)  POCT Blood Glucose.: 149 mg/dL (27 Dec 2018 08:56)  POCT Blood Glucose.: 155 mg/dL (26 Dec 2018 21:07)  POCT Blood Glucose.: 153 mg/dL (26 Dec 2018 16:44)      RADIOLOGY & ADDITIONAL TESTS:    Imaging Personally Reviewed:  [ x ] YES  [ ] NO    Consultant(s) Notes Reviewed:  [ x ] YES  [ ] NO

## 2018-12-27 NOTE — PROGRESS NOTE ADULT - SUBJECTIVE AND OBJECTIVE BOX
Patient is a 78y old  Male who presents with a chief complaint of dizziness (26 Dec 2018 19:30)    pt seen in icu [  ], reg med floor [ x  ], bed [ x ], chair at bedside [   ], a+o x3 [ x ], lethargic [  ],  nad [ x ]      Allergies    penicillin (Hives)        Vitals    T(F): 96.9 (12-27-18 @ 05:41), Max: 98.2 (12-26-18 @ 21:32)  HR: 87 (12-26-18 @ 21:32) (80 - 87)  BP: 115/40 (12-26-18 @ 21:32) (113/69 - 115/62)  RR: 18 (12-27-18 @ 05:41) (18 - 18)  SpO2: 95% (12-27-18 @ 05:41) (95% - 96%)  Wt(kg): --  CAPILLARY BLOOD GLUCOSE      POCT Blood Glucose.: 149 mg/dL (27 Dec 2018 08:56)      Labs              .Urine Clean Catch (Midstream)  12-24 @ 10:58   <10,000 CFU/ml Normal Urogenital juliane present  --  --          Radiology Results      < from: MR Head w/ IV Cont (12.26.18 @ 12:56) >  FINDINGS:  There is no abnormal intraparenchymal or leptomeningeal enhancement.    There is mild diffuse parenchymal volume loss. There are T2 prolongation   signal abnormalities in the periventricular subcortical white matter   likely related to mild chronic microvascular ischemic changes.    There is no acute parenchymal hemorrhage, parenchymal mass, mass effect   or midline shift. There is no extra-axial fluid collection.  There is no   hydrocephalus.  There is no acute infarct.    Partial opacification mastoid air cells    IMPRESSION:  No parenchymal mass, acute intracranial hemorrhage or acute infarct.    < end of copied text >        < from: MR Angio Head No Cont (12.26.18 @ 12:55) >  FINDINGS:  The proximal anterior, middle and posterior cerebral arteries are patent.   Attenuation distal left posterior cerebral arteries noted.    The intracranial vertebral and basilar arteries are patent. Dominant left   vertebral artery.    There is no MR evidence of intracranial aneurysm. Infundibulum at the   origin of fetal left posterior cerebral artery.    IMPRESSION:  Attenuation distal left posterior cerebral artery.    < end of copied text >        Meds    MEDICATIONS  (STANDING):  atorvastatin 40 milliGRAM(s) Oral at bedtime  cyanocobalamin 1000 MICROGram(s) Oral daily  docusate sodium 100 milliGRAM(s) Oral daily  ergocalciferol 29169 Unit(s) Oral <User Schedule>  escitalopram 20 milliGRAM(s) Oral daily  insulin lispro (HumaLOG) corrective regimen sliding scale   SubCutaneous Before meals and at bedtime  lisinopril 10 milliGRAM(s) Oral daily  meclizine 25 milliGRAM(s) Oral every 8 hours  pantoprazole    Tablet 40 milliGRAM(s) Oral before breakfast  risperiDONE   Tablet 0.5 milliGRAM(s) Oral daily  senna 2 Tablet(s) Oral at bedtime      MEDICATIONS  (PRN):  aluminum hydroxide/magnesium hydroxide/simethicone Suspension 30 milliLiter(s) Oral every 6 hours PRN Dyspepsia      Physical Exam      Neuro :  no focal deficits  Respiratory: CTA B/L  CV: RRR, S1S2, no murmurs,   Abdominal: Soft, NT, ND +BS,  Extremities: No edema, + peripheral pulses    ASSESSMENT    Dizziness and giddiness   r/o cns path  vasovagal episode possibly 2nd acute drug reaction,   abdominal wall fluid collection,   h/o Anxiety  Depression  HLD (hyperlipidemia)  DM (diabetes mellitus)  HTN (hypertension)  No significant past surgical history      PLAN      ct head neg  neuro f/u  MRI brain and MRA head neg for acute infarct or aneurysm, but with partial opacification of mastoid air cells noted above  f/u acetylcholine receptor antibody to eval for myasthenia gravis  pt to have spinal tap  pt to start on ivig  cont aspirin atorvastatin  cont meclizine  start levaquin 500mg daily x 7 days for possible mastoiditis  cardiof/u  cont lisinopril 10 mg daily  orthostatic bp neg  gi cons  cont maalox prn  cont protonix,  surg cons noted  cont current meds

## 2018-12-27 NOTE — PROGRESS NOTE ADULT - ASSESSMENT
78 yr old Male , from home, AAOx3, w/ PMHx T2DM, HTN, HLD, Depression who presents to the ED c/o dizziness ,nausea and vomiting.  1.Tele monitoring.  2.Neurology f/u.  3.HTN-dec lisnopril 5mg qd.  4.Orthostatic BP q shift.  5.DM-insulin.  6.Cont asa,statin.  7.Echocardiogram.  8.Gi and DVT prophylaxis.

## 2018-12-27 NOTE — CONSULT NOTE ADULT - ASSESSMENT
A/P   Onychomycosis/Onychocryptosis  Ingrown toenail left  PVD/VI  Xerosis  H/O DM, HTN, HLD, Depression     Patient evaluated and chart reviewed  Performed aseptic debridement of all toenails without complications  Performed partial nail avulsion of left hallux medial nail border without complication, bacitracin with DSD  Elevate bilateral LE and off load bilateral heel

## 2018-12-27 NOTE — PROGRESS NOTE ADULT - SUBJECTIVE AND OBJECTIVE BOX
Patient is a 78y old  Male who presents with a chief complaint of dizziness (27 Dec 2018 10:31)    HPI: 78y Male  presents with a chief complaint of dizziness now c/o abdominal pain.         . The patient has a significant past medical history of Anxiety, Depression, HLD(hyperlipidemia)  DM (diabetes mellitus), and HTN (hypertension)            .   REVIEW OF SYSTEMS  Constitutional:   No fever, no fatigue, no pallor, no night sweats, no weight loss.  HEENT:   No eye pain, no vision changes, no icterus, no mouth ulcers.  Respiratory:   No shortness of breath, no cough, no respiratory distress.   Cardiovascular:   No chest pain, no palpitations.   Gastrointestinal: +abdominal pain, + nausea, no vomiting , no diarrhea, + constipation, no hematochezia, no melena.  Skin:   No rashes, no jaundice, no eczema.   Musculoskeletal:   No joint pain, no swelling, no myalgia.   Neurologic:   No headache, no seizure, no weakness.   Genitourinary:   No dysuria, no decreased urine output.  Psychiatric:  No depression, no anxiety,   Endocrine:   No thyroid disease, no diabetes.  Heme/Lymphatic:   No anemia, no blood transfusions, no lymph node enlargement, no bleeding, no bruising.  ___________________________________________________________________________________________  Allergies    penicillin (Hives)    Intolerances      MEDICATIONS  (STANDING):  atorvastatin 40 milliGRAM(s) Oral at bedtime  cyanocobalamin 1000 MICROGram(s) Oral daily  docusate sodium 100 milliGRAM(s) Oral daily  ergocalciferol 27119 Unit(s) Oral <User Schedule>  escitalopram 20 milliGRAM(s) Oral daily  insulin lispro (HumaLOG) corrective regimen sliding scale   SubCutaneous Before meals and at bedtime  lactulose Syrup 20 Gram(s) Oral once  lisinopril 5 milliGRAM(s) Oral daily  meclizine 25 milliGRAM(s) Oral every 8 hours  pantoprazole    Tablet 40 milliGRAM(s) Oral before breakfast  risperiDONE   Tablet 0.5 milliGRAM(s) Oral daily  senna 2 Tablet(s) Oral at bedtime    MEDICATIONS  (PRN):  aluminum hydroxide/magnesium hydroxide/simethicone Suspension 30 milliLiter(s) Oral every 6 hours PRN Dyspepsia      PAST MEDICAL & SURGICAL HISTORY:  Anxiety  Depression  HLD (hyperlipidemia)  DM (diabetes mellitus)  HTN (hypertension)  Significant past surgical history - Umbilical hernia repair 7-10 yrs ago    FAMILY HISTORY:  Family history of heart disease (Father)    Social History: No history of : Tobacco use, IVDA, EToH  ______________________________________________________________________________________    PHYSICAL EXAM    Daily     Daily   BMI: 38 (12-23 @ 21:08)  Change in Weight:  Vital Signs Last 24 Hrs  T(C): 36.1 (27 Dec 2018 05:41), Max: 36.8 (26 Dec 2018 21:32)  T(F): 96.9 (27 Dec 2018 05:41), Max: 98.2 (26 Dec 2018 21:32)  HR: 87 (26 Dec 2018 21:32) (82 - 87)  BP: 115/40 (26 Dec 2018 21:32) (115/40 - 115/62)  BP(mean): --  RR: 18 (27 Dec 2018 05:41) (18 - 18)  SpO2: 95% (27 Dec 2018 05:41) (95% - 96%)    General:  Well developed, well nourished, alert and active, no pallor, NAD.  HEENT:    Normal appearance of conjunctiva, ears, nose, lips, oropharynx, and oral mucosa, anicteric.  Neck:  No masses, no asymmetry.  Lymph Nodes:  No lymphadenopathy.   Cardiovascular:  RRR normal S1/S2, no murmur.  Respiratory:  CTA B/L, normal respiratory effort.   Abdominal:   soft, obese, no masses, normoactive BS, mild tenderness noted to epigastric region on palp. no HSM, no rebound, no guarding.  Extremities:   No clubbing or cyanosis, normal capillary refill, no edema.   Skin:   No rash, jaundice, lesions, eczema.   Musculoskeletal:  No joint swelling, erythema or tenderness.   Neuro: No focal deficits.   Other:   _______________________________________________________________________________________________  Lab Results:    No labs today                      Stool Results:          RADIOLOGY RESULTS:    SURGICAL PATHOLOGY:

## 2018-12-27 NOTE — CONSULT NOTE ADULT - SUBJECTIVE AND OBJECTIVE BOX
77 y/o Male, from home, AAOx3, w/ PMHx T2DM, HTN, HLD, Depression who presents to the ED c/o dizziness and diaphoresis after taking Cefuroxime 500 mg at 4pm, in addition to face flushing and nausea, felt dizzy and fell to the floor. Denies chest pain, palpitations, weakness, LOC or any other complaint.   Admitted with Vertigo.  Consulted for painful toenails     Review of Systems:  Other Review of Systems: All other review of systems negative, except as noted in HPI      Allergies and Intolerances:        Allergies:  	penicillin: Drug, Hives    Home Medications:   * Patient Currently Takes Medications as of 24-Dec-2018 04:45 documented in Structured Notes  · 	ramipril 5 mg oral capsule: 1 cap(s) orally once a day, Last Dose Taken:    · 	metFORMIN 500 mg oral tablet: 1 tab(s) orally 3 times a day, Last Dose Taken:    · 	risperiDONE 0.5 mg oral tablet: 1 tab(s) orally once a day  · 	escitalopram 20 mg oral tablet: 1 tab(s) orally once a day, Last Dose Taken:    · 	Lipitor 40 mg oral tablet: 1 tab(s) orally once a day, Last Dose Taken:    · 	cefuroxime 500 mg oral tablet: 1 tab(s) orally every 12 hours, Last Dose Taken:    · 	benzonatate 200 mg oral capsule: 1 cap(s) orally 3 times a day, Last Dose Taken:      .    Patient History:    Past Medical History:  Anxiety    Depression    DM (diabetes mellitus)    HLD (hyperlipidemia)    HTN (hypertension).     Past Surgical History:  No significant past surgical history.     Family History:  Father  Still living? No  Family history of heart disease, Age at diagnosis: Age Unknown.     Social History:  Social History (marital status, living situation, occupation, tobacco use, alcohol and drug use, and sexual history): no tobacco, no etoh smoking     Tobacco Screening:  · Core Measure Site	Yes  · Has the patient used tobacco in the past 30 days?	No    Risk Assessment:    Present on Admission:  Deep Venous Thrombosis	no  Pulmonary Embolus	no    PHYSICAL EXAM:  77 y/o male seen at bedside in NAD. AOX3 For c/o painful toenails, denies any trauma  Admitted with Vertigo.    Vital Signs Last 24 Hrs  T(C): 36.6 (27 Dec 2018 15:08), Max: 36.8 (26 Dec 2018 21:32)  T(F): 97.9 (27 Dec 2018 15:08), Max: 98.2 (26 Dec 2018 21:32)  HR: 73 (27 Dec 2018 15:08) (73 - 87)  BP: 113/56 (27 Dec 2018 15:08) (113/56 - 115/40)  BP(mean): --  RR: 18 (27 Dec 2018 15:08) (18 - 18)  SpO2: 95% (27 Dec 2018 15:08) (95% - 95%)    PE: B/L Foot & Leg  Vascular: DP1/4, PT: 0/4 b/l; Dopper-able pedal pulses bilateral. CFT< 3 sec x 10; TG: cool; trace pitting edema b/l LE  Derm: Skin is cool and dry, pedal hair diminished. Chronic venous stasis changes bilateral LE    Toenails are elongated, thickened and dystrophic, incurvated x 10, Incurvated left hallux medial border, no erythema, no drainage  Neuro: protective sensation diminished bilateral foot  M/S: Dorsally contracted digits 2-5 bilateral, Laterally deviated hallux bilateral.	      LABS/DIAGNOSTIC TESTS               15.6   12.7  )-----------( 276      ( 24 Dec 2018 04:49 )             47.2       12-24    137  |  103  |  16  ----------------------------<  164<H>  3.8   |  25  |  0.94    Ca    8.7      24 Dec 2018 04:53  Phos  3.3     12-24  Mg     2.1     12-24    TPro  7.7  /  Alb  3.9  /  TBili  0.8  /  DBili  x   /  AST  14  /  ALT  31  /  AlkPhos  72  12-24

## 2018-12-27 NOTE — EEG REPORT - NS EEG TEXT BOX
[facility]   COMPREHENSIVE EPILEPSY CENTER   REPORT OF ROUTINE VIDEO EEG     [facility]: [address], [place] [address], Ph#: [phone]  [facility]: [address], [place] [address], Ph#: [phone]  Office: [address] [place] [address], [facility] [address], [place] [address] Ph#: [phone]    Patient Name: ITALO ROGER  Age and : 78y (42)  MRN #: [id number]  Location: [address]  Referring Physician: Shiv Us    Study Date: 18    _____________________________________________________________  TECHNICAL INFORMATION    Placement and Labeling of Electrodes:  The EEG was performed utilizing 20 channels referential EEG connections (coronal over temporal over parasagittal montage) using all standard 10-20 electrode placements with EKG.  Recording was at a sampling rate of 256 samples per second per channel.  Time synchronized digital video recording was done simultaneously with EEG recording.  A low light infrared camera was used for low light recording.  Zachary and seizure detection algorithms were utilized.    _____________________________________________________________  HISTORY    Patient is a 78y old  Male who presents with a chief complaint of dizziness (27 Dec 2018 13:04)      PERTINENT MEDICATION:  No AEDs  _____________________________________________________________  STUDY INTERPRETATION    Findings: The background was continuous, spontaneously variable and reactive. During wakefulness, the posterior dominant rhythm consisted of symmetric, 8 Hz activity, with amplitude to 30 uV, that attenuated to eye opening.  Low amplitude frontal beta was noted in wakefulness.    Background Slowing:  Background predominantly consisted of theta, delta and faster activities.    Focal Slowing:   None was present.    Sleep Background:  Drowsiness was characterized by fragmentation, attenuation, and slowing of the background activity.    Sleep was characterized by the presence of vertex waves, symmetric spindles, and K-complexes.    Other Non-Epileptiform Findings:  None were present.    Interictal Epileptiform Activity:   None were present.    Events:  Clinical events: None recorded.  Seizures: None recorded.    Activation Procedures:   Hyperventilation was not performed.    Photic stimulation was performed and did not elicit any abnormality.     Artifacts:  Intermittent myogenic and movement artifacts were noted.    ECG:  The heart rate on single channel ECG was predominantly between 80-90 BPM.    _____________________________________________________________  EEG SUMMARY/CLASSIFICATION  Abnormal EEG in the awake, drowsy and asleep states.  - Mild generalized slowing.  ____________________________________________________________  EEG IMPRESSION/CLINICAL CORRELATE  Abnormal EEG study.  1. Mild nonspecific diffuse or multifocal cerebral dysfunction.   2. No epileptiform pattern or seizure seen.      Preliminary Fellow Read:      Mary Jo Márquez MD  Adult EEG Fellow, [facility] Epilepsy [facility]   COMPREHENSIVE EPILEPSY CENTER   REPORT OF ROUTINE VIDEO EEG     [facility]: [address], [place] [address], Ph#: [phone]  [facility]: [address], [place] [address], Ph#: [phone]  Office: [address] [place] [address], [facility] [address], [place] [address] Ph#: [phone]    Patient Name: ITALO ROGER  Age and : 78y (42)  MRN #: [id number]  Location: [address]  Referring Physician: Shiv Us    Study Date: 18    _____________________________________________________________  TECHNICAL INFORMATION    Placement and Labeling of Electrodes:  The EEG was performed utilizing 20 channels referential EEG connections (coronal over temporal over parasagittal montage) using all standard 10-20 electrode placements with EKG.  Recording was at a sampling rate of 256 samples per second per channel.  Time synchronized digital video recording was done simultaneously with EEG recording.  A low light infrared camera was used for low light recording.  Zachary and seizure detection algorithms were utilized.    _____________________________________________________________  HISTORY    Patient is a 78y old  Male who presents with a chief complaint of dizziness (27 Dec 2018 13:04)      PERTINENT MEDICATION:  No AEDs  _____________________________________________________________  STUDY INTERPRETATION    Findings: The background was continuous, spontaneously variable and reactive. During wakefulness, the posterior dominant rhythm consisted of symmetric, 8 Hz activity, with amplitude to 30 uV, that attenuated to eye opening.  Low amplitude frontal beta was noted in wakefulness.    Background Slowing:  Background predominantly consisted of theta, delta and faster activities.    Focal Slowing:   None was present.    Sleep Background:  Drowsiness was characterized by fragmentation, attenuation, and slowing of the background activity.    Sleep was characterized by the presence of vertex waves, symmetric spindles, and K-complexes.    Other Non-Epileptiform Findings:  None were present.    Interictal Epileptiform Activity:   None were present.    Events:  Clinical events: None recorded.  Seizures: None recorded.    Activation Procedures:   Hyperventilation was not performed.    Photic stimulation was performed and did not elicit any abnormality.     Artifacts:  Intermittent myogenic and movement artifacts were noted.    ECG:  The heart rate on single channel ECG was predominantly between 80-90 BPM.    _____________________________________________________________  EEG SUMMARY/CLASSIFICATION  Abnormal EEG in the awake, drowsy and asleep states.  - Mild generalized slowing.  ____________________________________________________________  EEG IMPRESSION/CLINICAL CORRELATE  Abnormal EEG study.  1. Mild nonspecific diffuse or multifocal cerebral dysfunction.   2. No epileptiform pattern or seizure seen.      Mary Jo Márquez MD  Adult EEG Fellow, [facility]    Niels Barillas MD  EEG / Epilepsy Attending Physician

## 2018-12-27 NOTE — CONSULT NOTE ADULT - SUBJECTIVE AND OBJECTIVE BOX
Patient is a 78y old  Male who presents with a chief complaint of dizziness (27 Dec 2018 10:31)    HPI: 78y Male  presents with a chief complaint of dizziness, now GI to evaluate for c/o abdominal pain. The patient has a significant past medical history of Anxiety, Depression  HLD (hyperlipidemia), DM (diabetes mellitus), and HTN (hypertension)             .     REVIEW OF SYSTEMS  Constitutional:   No fever, no fatigue, no pallor, no night sweats, no weight loss.  HEENT:   No eye pain, no vision changes, no icterus, no mouth ulcers.  Respiratory:   No shortness of breath, no cough, no respiratory distress.   Cardiovascular:   No chest pain, no palpitations.   Gastrointestinal: + abdominal pain, + nausea, no vomiting , no diarrhea, +constipation, no hematochezia, no melena.  Skin:   No rashes, no jaundice, no eczema.   Musculoskeletal:   No joint pain, no swelling, no myalgia.   Neurologic:   No headache, no seizure, no weakness.   Genitourinary:   No dysuria, no decreased urine output.  Psychiatric:  No depression, no anxiety,   Endocrine:   No thyroid disease, no diabetes.  Heme/Lymphatic:   No anemia, no blood transfusions, no lymph node enlargement, no bleeding, no bruising.  ___________________________________________________________________________________________  Allergies    penicillin (Hives)    Intolerances      MEDICATIONS  (STANDING):  atorvastatin 40 milliGRAM(s) Oral at bedtime  cyanocobalamin 1000 MICROGram(s) Oral daily  docusate sodium 100 milliGRAM(s) Oral daily  ergocalciferol 25871 Unit(s) Oral <User Schedule>  escitalopram 20 milliGRAM(s) Oral daily  insulin lispro (HumaLOG) corrective regimen sliding scale   SubCutaneous Before meals and at bedtime  lactulose Syrup 20 Gram(s) Oral once  lisinopril 5 milliGRAM(s) Oral daily  meclizine 25 milliGRAM(s) Oral every 8 hours  pantoprazole    Tablet 40 milliGRAM(s) Oral before breakfast  risperiDONE   Tablet 0.5 milliGRAM(s) Oral daily  senna 2 Tablet(s) Oral at bedtime    MEDICATIONS  (PRN):  aluminum hydroxide/magnesium hydroxide/simethicone Suspension 30 milliLiter(s) Oral every 6 hours PRN Dyspepsia      PAST MEDICAL & SURGICAL HISTORY:  Anxiety  Depression  HLD (hyperlipidemia)  DM (diabetes mellitus)  HTN (hypertension)  significant past surgical history - Umbilical Hernia Repair 7-10 yrs ago    FAMILY HISTORY:  Family history of heart disease (Father)    Social History: No history of : Tobacco use, IVDA, EToH  ______________________________________________________________________________________    PHYSICAL EXAM    Daily     Daily   BMI: 38 (12-23 @ 21:08)  Change in Weight:  Vital Signs Last 24 Hrs  T(C): 36.1 (27 Dec 2018 05:41), Max: 36.8 (26 Dec 2018 21:32)  T(F): 96.9 (27 Dec 2018 05:41), Max: 98.2 (26 Dec 2018 21:32)  HR: 87 (26 Dec 2018 21:32) (82 - 87)  BP: 115/40 (26 Dec 2018 21:32) (115/40 - 115/62)  BP(mean): --  RR: 18 (27 Dec 2018 05:41) (18 - 18)  SpO2: 95% (27 Dec 2018 05:41) (95% - 96%)    General:  Well developed, well nourished, alert and active, no pallor, NAD.  HEENT:    Normal appearance of conjunctiva, ears, nose, lips, oropharynx, and oral mucosa, anicteric.  Neck:  No masses, no asymmetry.  Lymph Nodes:  No lymphadenopathy.   Cardiovascular:  RRR normal S1/S2, no murmur.  Respiratory:  CTA B/L, normal respiratory effort.   Abdominal:   soft, no masses, +tenderness to epigastric area on palp, normoactive BS, no HSM, no guarding, no rebound.  Extremities:   No clubbing or cyanosis, normal capillary refill, no edema.   Skin:   No rash, jaundice, lesions, eczema.   Musculoskeletal:  No joint swelling, erythema or tenderness.   Neuro: No focal deficits.   Other:   _______________________________________________________________________________________________  Lab Results:    No labs today                      Stool Results:          RADIOLOGY RESULTS:    SURGICAL PATHOLOGY:

## 2018-12-27 NOTE — PROGRESS NOTE ADULT - ASSESSMENT
78 yr old Male , from home, AAOx3, w/ PMHx T2DM, HTN, HLD, Depression who presents to the ED c/o dizziness. Patient evaluated for dizziness and giddiness on antivert neuro following now c/o diffuse abdominal pain which he describes as burning in epigastric area. No chest pain, dizziness, SOB, palpitations, or diarrhea. He reports h/o constipation and states last BM was 4 days ago. CT Abd/Pelvis revealed  4.7 x 0.7 x 4.0 cm thick-walled fluid collection within the anterior abdominal and an umbilical hernia repair site. No significant surrounding inflammation. Patient states he never had EGD/Colonoscopy. GI asked to evaluate.

## 2018-12-27 NOTE — PROGRESS NOTE ADULT - PROBLEM SELECTOR PLAN 1
Patient p/w dizziness, diaphoresis, nausea and face flushing after taking Cefuroxime. Symptoms most likely due to allergic reaction.   ECG: NSR VR@92bpm T1: negative  CT brain: no acute pathology  MRI/MRA: chronic microvascular ischemic changes; Attenuation distal left posterior cerebral artery.   Echo: grossly normal LV function, mild aortic stenosis, grade 1 DD  Neuro Dr Forrest: recc paraneoplastic antibody profile; spinal tap  Cardio Dr Mcdowell: tele, orthostats q shift, decrease lisinopril to 5 mg

## 2018-12-27 NOTE — CONSULT NOTE ADULT - ASSESSMENT
78M, from home, AAOx3, w/ PMHx T2DM, HTN, HLD, Depression who presents to the ED c/o dizziness, diaphoresis, and face flushing after taking Cefuroxime 500 mg. Patient admitted and is being followed by neuro. GI consulted for c/o diffuse abdominal pain which patient describes as burning. He denies vomiting, or diarrhea, but reports nausea at times and constipation. Last BM 3-4 days ago. Patient denies ever having EGD/Colonoscopy.

## 2018-12-27 NOTE — PROGRESS NOTE ADULT - SUBJECTIVE AND OBJECTIVE BOX
CHIEF COMPLAINT:Patient is a 78y old  Male who presents with a chief complaint of dizziness.Pt appears comfortable.    	  REVIEW OF SYSTEMS:  CONSTITUTIONAL: No fever, weight loss, or fatigue  EYES: No eye pain, visual disturbances, or discharge  ENT:  No difficulty hearing, tinnitus, vertigo; No sinus or throat pain  NECK: No pain or stiffness  RESPIRATORY: No cough, wheezing, chills or hemoptysis; No Shortness of Breath  CARDIOVASCULAR: No chest pain, palpitations, passing out, dizziness, or leg swelling  GASTROINTESTINAL: No abdominal or epigastric pain. No nausea, vomiting, or hematemesis; No diarrhea or constipation. No melena or hematochezia.  GENITOURINARY: No dysuria, frequency, hematuria, or incontinence  NEUROLOGICAL: No headaches, memory loss, loss of strength, numbness, or tremors  SKIN: No itching, burning, rashes, or lesions   LYMPH Nodes: No enlarged glands  ENDOCRINE: No heat or cold intolerance; No hair loss  MUSCULOSKELETAL: No joint pain or swelling; No muscle, back, or extremity pain  PSYCHIATRIC: No depression, anxiety, mood swings, or difficulty sleeping  HEME/LYMPH: No easy bruising, or bleeding gums  ALLERGY AND IMMUNOLOGIC: No hives or eczema	      PHYSICAL EXAM:  T(C): 36.1 (12-27-18 @ 05:41), Max: 36.8 (12-26-18 @ 21:32)  HR: 87 (12-26-18 @ 21:32) (80 - 87)  BP: 115/40 (12-26-18 @ 21:32) (113/69 - 115/62)  RR: 18 (12-27-18 @ 05:41) (18 - 18)  SpO2: 95% (12-27-18 @ 05:41) (95% - 96%)    I&O's Summary    26 Dec 2018 07:01  -  27 Dec 2018 07:00  --------------------------------------------------------  IN: 0 mL / OUT: 300 mL / NET: -300 mL        Appearance: Normal	  HEENT:   Normal oral mucosa, PERRL, EOMI	  Lymphatic: No lymphadenopathy  Cardiovascular: Normal S1 S2, No JVD, No murmurs, No edema  Respiratory: Lungs clear to auscultation	  Psychiatry: A & O x 3, Mood & affect appropriate  Gastrointestinal:  Soft, Non-tender, + BS	  Skin: No rashes, No ecchymoses, No cyanosis	  Neurologic: Non-focal  Extremities: Normal range of motion, No clubbing, cyanosis or edema  Vascular: Peripheral pulses palpable 2+ bilaterally    MEDICATIONS  (STANDING):  atorvastatin 40 milliGRAM(s) Oral at bedtime  cyanocobalamin 1000 MICROGram(s) Oral daily  docusate sodium 100 milliGRAM(s) Oral daily  ergocalciferol 30129 Unit(s) Oral <User Schedule>  escitalopram 20 milliGRAM(s) Oral daily  insulin lispro (HumaLOG) corrective regimen sliding scale   SubCutaneous Before meals and at bedtime  lactulose Syrup 20 Gram(s) Oral once  lisinopril 5 milliGRAM(s) Oral daily  meclizine 25 milliGRAM(s) Oral every 8 hours  pantoprazole    Tablet 40 milliGRAM(s) Oral before breakfast  risperiDONE   Tablet 0.5 milliGRAM(s) Oral daily  senna 2 Tablet(s) Oral at bedtime      	  LABS:	 	    proBNP: Serum Pro-Brain Natriuretic Peptide: 59 pg/mL (12-23 @ 23:00)    Lipid Profile: Cholesterol 200    HDL 43      HgA1c: Hemoglobin A1C, Whole Blood: 7.9 % (12-24 @ 10:48)    TSH: Thyroid Stimulating Hormone, Serum: 2.39 uU/mL (12-24 @ 04:53)      EXAM:  MR ANGIO BRAIN                            PROCEDURE DATE:  12/26/2018          INTERPRETATION:  CLINICAL STATEMENT: Evaluate for stenosis. CVA    TECHNIQUE: MRA of the head was performed without gadolinium. 3-D MIP   images were obtained.    COMPARISON: None.    FINDINGS:  The proximal anterior, middle and posterior cerebral arteries are patent.   Attenuation distal left posterior cerebral arteries noted.    The intracranial vertebral and basilar arteries are patent. Dominant left   vertebral artery.    There is no MR evidence of intracranial aneurysm. Infundibulum at the   origin of fetal left posterior cerebral artery.    IMPRESSION:  Attenuation distal left posterior cerebral artery.    EXAM:  MR BRAIN IC                            PROCEDURE DATE:  12/26/2018          INTERPRETATION:  CLINICAL STATEMENT: Rule out cerebellar stroke. Dizziness    TECHNIQUE: MRI of the brain was performed with and without gadolinium.    COMPARISON: CT head 12/23/2018.    FINDINGS:  There is no abnormal intraparenchymal or leptomeningeal enhancement.    There is mild diffuse parenchymal volume loss. There are T2 prolongation   signal abnormalities in the periventricular subcortical white matter   likely related to mild chronic microvascular ischemic changes.    There is no acute parenchymal hemorrhage, parenchymal mass, mass effect   or midline shift. There is no extra-axial fluid collection.  There is no   hydrocephalus.  There is no acute infarct.    Partial opacification mastoid air cells    IMPRESSION:  No parenchymal mass, acute intracranial hemorrhage or acute infarct.

## 2018-12-28 LAB
ANION GAP SERPL CALC-SCNC: 8 MMOL/L — SIGNIFICANT CHANGE UP (ref 5–17)
APPEARANCE CSF: CLEAR — SIGNIFICANT CHANGE UP
APTT BLD: 28.4 SEC — SIGNIFICANT CHANGE UP (ref 27.5–36.3)
BUN SERPL-MCNC: 23 MG/DL — HIGH (ref 7–18)
CALCIUM SERPL-MCNC: 8.3 MG/DL — LOW (ref 8.4–10.5)
CHLORIDE SERPL-SCNC: 105 MMOL/L — SIGNIFICANT CHANGE UP (ref 96–108)
CO2 SERPL-SCNC: 27 MMOL/L — SIGNIFICANT CHANGE UP (ref 22–31)
COLOR CSF: SIGNIFICANT CHANGE UP
CREAT SERPL-MCNC: 0.97 MG/DL — SIGNIFICANT CHANGE UP (ref 0.5–1.3)
GLUCOSE BLDC GLUCOMTR-MCNC: 127 MG/DL — HIGH (ref 70–99)
GLUCOSE BLDC GLUCOMTR-MCNC: 129 MG/DL — HIGH (ref 70–99)
GLUCOSE BLDC GLUCOMTR-MCNC: 159 MG/DL — HIGH (ref 70–99)
GLUCOSE BLDC GLUCOMTR-MCNC: 168 MG/DL — HIGH (ref 70–99)
GLUCOSE CSF-MCNC: 97 MG/DL — HIGH (ref 40–70)
GLUCOSE SERPL-MCNC: 179 MG/DL — HIGH (ref 70–99)
HCT VFR BLD CALC: 41.3 % — SIGNIFICANT CHANGE UP (ref 39–50)
HGB BLD-MCNC: 13.3 G/DL — SIGNIFICANT CHANGE UP (ref 13–17)
INR BLD: 1.06 RATIO — SIGNIFICANT CHANGE UP (ref 0.88–1.16)
LYMPHOCYTES # CSF: 100 % — HIGH (ref 40–80)
MAGNESIUM SERPL-MCNC: 2.2 MG/DL — SIGNIFICANT CHANGE UP (ref 1.6–2.6)
MCHC RBC-ENTMCNC: 31.6 PG — SIGNIFICANT CHANGE UP (ref 27–34)
MCHC RBC-ENTMCNC: 32.2 GM/DL — SIGNIFICANT CHANGE UP (ref 32–36)
MCV RBC AUTO: 98.1 FL — SIGNIFICANT CHANGE UP (ref 80–100)
NEUTROPHILS # CSF: 0 % — SIGNIFICANT CHANGE UP (ref 0–6)
NRBC NFR CSF: 1 /UL — SIGNIFICANT CHANGE UP (ref 0–5)
PHOSPHATE SERPL-MCNC: 2.5 MG/DL — SIGNIFICANT CHANGE UP (ref 2.5–4.5)
PLATELET # BLD AUTO: 242 K/UL — SIGNIFICANT CHANGE UP (ref 150–400)
POTASSIUM SERPL-MCNC: 3.9 MMOL/L — SIGNIFICANT CHANGE UP (ref 3.5–5.3)
POTASSIUM SERPL-SCNC: 3.9 MMOL/L — SIGNIFICANT CHANGE UP (ref 3.5–5.3)
PROT CSF-MCNC: 49 MG/DL — HIGH (ref 15–45)
PROTHROM AB SERPL-ACNC: 11.8 SEC — SIGNIFICANT CHANGE UP (ref 10–12.9)
RBC # BLD: 4.21 M/UL — SIGNIFICANT CHANGE UP (ref 4.2–5.8)
RBC # CSF: 2 /UL — HIGH (ref 0–0)
RBC # FLD: 12.6 % — SIGNIFICANT CHANGE UP (ref 10.3–14.5)
SODIUM SERPL-SCNC: 140 MMOL/L — SIGNIFICANT CHANGE UP (ref 135–145)
TUBE TYPE: SIGNIFICANT CHANGE UP
WBC # BLD: 10.5 K/UL — SIGNIFICANT CHANGE UP (ref 3.8–10.5)
WBC # FLD AUTO: 10.5 K/UL — SIGNIFICANT CHANGE UP (ref 3.8–10.5)

## 2018-12-28 PROCEDURE — 99232 SBSQ HOSP IP/OBS MODERATE 35: CPT | Mod: 25

## 2018-12-28 PROCEDURE — 62270 DX LMBR SPI PNXR: CPT

## 2018-12-28 RX ORDER — IMMUNE GLOBULIN (HUMAN) 10 G/100ML
27.36 INJECTION INTRAVENOUS; SUBCUTANEOUS DAILY
Qty: 0 | Refills: 0 | Status: DISCONTINUED | OUTPATIENT
Start: 2018-12-28 | End: 2018-12-29

## 2018-12-28 RX ORDER — LIDOCAINE HCL 20 MG/ML
10 VIAL (ML) INJECTION ONCE
Qty: 0 | Refills: 0 | Status: COMPLETED | OUTPATIENT
Start: 2018-12-28 | End: 2018-12-28

## 2018-12-28 RX ORDER — SODIUM CHLORIDE 9 MG/ML
1000 INJECTION INTRAMUSCULAR; INTRAVENOUS; SUBCUTANEOUS
Qty: 0 | Refills: 0 | Status: DISCONTINUED | OUTPATIENT
Start: 2018-12-28 | End: 2018-12-28

## 2018-12-28 RX ADMIN — PANTOPRAZOLE SODIUM 40 MILLIGRAM(S): 20 TABLET, DELAYED RELEASE ORAL at 06:08

## 2018-12-28 RX ADMIN — Medication 10 MILLILITER(S): at 16:28

## 2018-12-28 RX ADMIN — LISINOPRIL 5 MILLIGRAM(S): 2.5 TABLET ORAL at 05:19

## 2018-12-28 RX ADMIN — Medication 1: at 22:19

## 2018-12-28 RX ADMIN — Medication 1 DROP(S): at 06:09

## 2018-12-28 RX ADMIN — ATORVASTATIN CALCIUM 40 MILLIGRAM(S): 80 TABLET, FILM COATED ORAL at 22:19

## 2018-12-28 RX ADMIN — Medication 25 MILLIGRAM(S): at 05:19

## 2018-12-28 RX ADMIN — Medication 1: at 12:42

## 2018-12-28 RX ADMIN — RISPERIDONE 0.5 MILLIGRAM(S): 4 TABLET ORAL at 12:41

## 2018-12-28 RX ADMIN — Medication 25 MILLIGRAM(S): at 14:12

## 2018-12-28 RX ADMIN — Medication 25 MILLIGRAM(S): at 22:19

## 2018-12-28 RX ADMIN — ESCITALOPRAM OXALATE 20 MILLIGRAM(S): 10 TABLET, FILM COATED ORAL at 12:41

## 2018-12-28 RX ADMIN — Medication 1 DROP(S): at 22:20

## 2018-12-28 RX ADMIN — Medication 1 DROP(S): at 14:11

## 2018-12-28 RX ADMIN — PREGABALIN 1000 MICROGRAM(S): 225 CAPSULE ORAL at 12:41

## 2018-12-28 NOTE — PROGRESS NOTE ADULT - SUBJECTIVE AND OBJECTIVE BOX
PGY1 Note discussed with supervising resident and primary attending.    Patient is a 78y old  Male who presents with a chief complaint of dizziness (28 Dec 2018 15:41)      INTERVAL HPI/OVERNIGHT EVENTS: patient assessed bedside, continues to have dizziness.     MEDICATIONS  (STANDING):  artificial  tears Solution 1 Drop(s) Both EYES three times a day  atorvastatin 40 milliGRAM(s) Oral at bedtime  cyanocobalamin 1000 MICROGram(s) Oral daily  ergocalciferol 81405 Unit(s) Oral <User Schedule>  escitalopram 20 milliGRAM(s) Oral daily  insulin lispro (HumaLOG) corrective regimen sliding scale   SubCutaneous Before meals and at bedtime  levoFLOXacin  Tablet 500 milliGRAM(s) Oral every 24 hours  meclizine 25 milliGRAM(s) Oral every 8 hours  pantoprazole    Tablet 40 milliGRAM(s) Oral before breakfast  risperiDONE   Tablet 0.5 milliGRAM(s) Oral daily    MEDICATIONS  (PRN):  aluminum hydroxide/magnesium hydroxide/simethicone Suspension 30 milliLiter(s) Oral every 6 hours PRN Dyspepsia      Allergies    penicillin (Hives)    Intolerances        REVIEW OF SYSTEMS:  CONSTITUTIONAL: No fever, weight loss, or fatigue  RESPIRATORY: No cough, wheezing, chills or hemoptysis; No shortness of breath  CARDIOVASCULAR: No chest pain, palpitations, dizziness, or leg swelling  GASTROINTESTINAL: diffuse abdominal pain. No nausea, vomiting, diarrhea or constipation.  NEUROLOGICAL: No headaches, weakness of LE  SKIN: No itching, burning, rashes, or lesions     Vital Signs Last 24 Hrs  T(C): 36.5 (28 Dec 2018 14:50), Max: 36.7 (27 Dec 2018 19:19)  T(F): 97.7 (28 Dec 2018 14:50), Max: 98 (27 Dec 2018 19:19)  HR: 75 (28 Dec 2018 14:50) (75 - 93)  BP: 106/62 (28 Dec 2018 14:50) (100/42 - 121/49)  BP(mean): --  RR: 18 (28 Dec 2018 14:50) (18 - 18)  SpO2: 91% (28 Dec 2018 14:50) (91% - 95%)    PHYSICAL EXAM:  GENERAL: Well-groomed, well-developed, obese  HEAD:  Atraumatic, Normocephalic  EYES: EOMI, PERRLA, conjunctiva and sclera clear  NECK: Supple, No JVD, Normal thyroid  CHEST/LUNG: Clear to percussion bilaterally; No rales, rhonchi, wheezing, or rubs  HEART: Regular rate and rhythm; No murmurs, rubs, or gallops  ABDOMEN: Soft, Nontender, Nondistended; Bowel sounds present  NERVOUS SYSTEM:  Alert & Oriented X3; Motor Strength 3/5 BLE   EXTREMITIES:  2+ Peripheral Pulses, No clubbing, cyanosis, or edema    LABS:                        13.3   10.5  )-----------( 242      ( 28 Dec 2018 06:08 )             41.3     12-28    140  |  105  |  23<H>  ----------------------------<  179<H>  3.9   |  27  |  0.97    Ca    8.3<L>      28 Dec 2018 06:08  Phos  2.5     12-28  Mg     2.2     12-28      PT/INR - ( 28 Dec 2018 06:08 )   PT: 11.8 sec;   INR: 1.06 ratio         PTT - ( 28 Dec 2018 06:08 )  PTT:28.4 sec    CAPILLARY BLOOD GLUCOSE      POCT Blood Glucose.: 168 mg/dL (28 Dec 2018 12:19)  POCT Blood Glucose.: 129 mg/dL (28 Dec 2018 08:17)  POCT Blood Glucose.: 143 mg/dL (27 Dec 2018 21:19)  POCT Blood Glucose.: 196 mg/dL (27 Dec 2018 16:54)      RADIOLOGY & ADDITIONAL TESTS:    Imaging Personally Reviewed:  [ ] YES  [ ] NO    Consultant(s) Notes Reviewed:  [ ] YES  [ ] NO

## 2018-12-28 NOTE — PROGRESS NOTE ADULT - PROBLEM SELECTOR PLAN 1
Patient p/w dizziness, diaphoresis, nausea and face flushing after taking Cefuroxime. Symptoms most likely due to allergic reaction.   ECG: NSR VR@92bpm T1: negative  CT brain: no acute pathology  MRI/MRA: chronic microvascular ischemic changes; Attenuation distal left posterior cerebral artery.   Echo: grossly normal LV function, mild aortic stenosis, grade 1 DD  Neuro Dr Forrest: recc paraneoplastic antibody profile; spinal tap  Cardio Dr Mcdowell: tele, orthostats q shift, lisinopril discontinued

## 2018-12-28 NOTE — PROGRESS NOTE ADULT - SUBJECTIVE AND OBJECTIVE BOX
INTERVAL HPI/OVERNIGHT EVENTS:    HEALTH ISSUES - PROBLEM Dx:  Abdominal pain: Abdominal pain  Vitamin D deficiency: Vitamin D deficiency  Need for prophylactic measure: Need for prophylactic measure  Depression: Depression  HTN (hypertension): HTN (hypertension)  DM (diabetes mellitus): DM (diabetes mellitus)  Abdominal wall fluid collections: Abdominal wall fluid collections  Vertigo: Vertigo          MEDICATIONS  (STANDING):  artificial  tears Solution 1 Drop(s) Both EYES three times a day  atorvastatin 40 milliGRAM(s) Oral at bedtime  cyanocobalamin 1000 MICROGram(s) Oral daily  ergocalciferol 62861 Unit(s) Oral <User Schedule>  escitalopram 20 milliGRAM(s) Oral daily  immune   globulin 10% (GAMMAGARD) IVPB 27.36 Gram(s) IV Intermittent daily  insulin lispro (HumaLOG) corrective regimen sliding scale   SubCutaneous Before meals and at bedtime  levoFLOXacin  Tablet 500 milliGRAM(s) Oral every 24 hours  meclizine 25 milliGRAM(s) Oral every 8 hours  pantoprazole    Tablet 40 milliGRAM(s) Oral before breakfast  risperiDONE   Tablet 0.5 milliGRAM(s) Oral daily    MEDICATIONS  (PRN):  aluminum hydroxide/magnesium hydroxide/simethicone Suspension 30 milliLiter(s) Oral every 6 hours PRN Dyspepsia      Allergies    penicillin (Hives)    Intolerances        REVIEW OF SYSTEMS      General:	    Skin/Breast:  	  Ophthalmologic:  	  ENMT:	    Respiratory and Thorax:  	  Cardiovascular:	    Gastrointestinal:	    Genitourinary:	    Musculoskeletal:	    Neurological:	    Psychiatric:	    Hematology/Lymphatics:	    Endocrine:	    Allergic/Immunologic:	    Vital Signs Last 24 Hrs  T(C): 36.8 (28 Dec 2018 21:11), Max: 36.9 (28 Dec 2018 19:39)  T(F): 98.3 (28 Dec 2018 21:11), Max: 98.4 (28 Dec 2018 19:39)  HR: 78 (28 Dec 2018 21:11) (75 - 84)  BP: 122/64 (28 Dec 2018 21:11) (106/62 - 122/64)  BP(mean): --  RR: 18 (28 Dec 2018 21:11) (18 - 18)  SpO2: 94% (28 Dec 2018 21:11) (91% - 94%)    PHYSICAL EXAM:      Constitutional:    Eyes:    ENMT:    Neck:    Breasts:    Back:    Respiratory:    Cardiovascular:    Gastrointestinal:    Genitourinary:    Rectal:    Extremities:    Vascular:    Neurological:    Skin:    Lymph Nodes:    Musculoskeletal:    Psychiatric:        LABS:                        13.3   10.5  )-----------( 242      ( 28 Dec 2018 06:08 )             41.3     12-28    140  |  105  |  23<H>  ----------------------------<  179<H>  3.9   |  27  |  0.97    Ca    8.3<L>      28 Dec 2018 06:08  Phos  2.5     12-28  Mg     2.2     12-28      PT/INR - ( 28 Dec 2018 06:08 )   PT: 11.8 sec;   INR: 1.06 ratio         PTT - ( 28 Dec 2018 06:08 )  PTT:28.4 sec      RADIOLOGY & ADDITIONAL TESTS: INTERVAL HPI/OVERNIGHT EVENTS: Continues to complain of dizziness    HEALTH ISSUES - PROBLEM Dx:  Abdominal pain: Abdominal pain  Vitamin D deficiency: Vitamin D deficiency  Need for prophylactic measure: Need for prophylactic measure  Depression: Depression  HTN (hypertension): HTN (hypertension)  DM (diabetes mellitus): DM (diabetes mellitus)  Abdominal wall fluid collections: Abdominal wall fluid collections  Vertigo: Vertigo          MEDICATIONS  (STANDING):  artificial  tears Solution 1 Drop(s) Both EYES three times a day  atorvastatin 40 milliGRAM(s) Oral at bedtime  cyanocobalamin 1000 MICROGram(s) Oral daily  ergocalciferol 13459 Unit(s) Oral <User Schedule>  escitalopram 20 milliGRAM(s) Oral daily  immune   globulin 10% (GAMMAGARD) IVPB 27.36 Gram(s) IV Intermittent daily  insulin lispro (HumaLOG) corrective regimen sliding scale   SubCutaneous Before meals and at bedtime  levoFLOXacin  Tablet 500 milliGRAM(s) Oral every 24 hours  meclizine 25 milliGRAM(s) Oral every 8 hours  pantoprazole    Tablet 40 milliGRAM(s) Oral before breakfast  risperiDONE   Tablet 0.5 milliGRAM(s) Oral daily    MEDICATIONS  (PRN):  aluminum hydroxide/magnesium hydroxide/simethicone Suspension 30 milliLiter(s) Oral every 6 hours PRN Dyspepsia      Allergies    penicillin (Hives)    Intolerances        REVIEW OF SYSTEMS  CONSTITUTIONAL: Admits generalized weakness, fatigue; denies malaise, fevers or chills, no weight change, appetite change  EYES: Reports blurred vision and double vision   Ears: no otalgia, no otorhea, no hearing loss, tinnitus  Nose: no epistaxis, rhinorrhea, post-discharge, sinus pressure  Throat: no throat pain, no oral lesions, tooth pain   NECK: No pain or stiffness  RESPIRATORY: No cough (productive or dry), wheezing, hemoptysis; reports slight shortness of breath, orthopnea, PND, VELASCO, snoring,  CARDIOVASCULAR: No chest pain or palpitations, no leg edema, no claudication    GASTROINTESTINAL: No abdominal or epigastric pain. No nausea, vomiting, or hematemesis; No diarrhea or constipation. No melena or hematochezia.  GENITOURINARY: No dysuria, frequency, urgency or hematuria, no pelvic pain, urinary incontinence, urgency  Musculoskeletal: no joints or muscle pain, no swelling in joints or muscles  NEUROLOGICAL: Admits to headache, dizziness, vertigo, Denies  memory loss, seizures, syncope, ataxia  SKIN: No pruritis, rashes, lesions or new moles  Psych: No anxiety, sadness, insomnia, suicide thoughts  Endocrine: No Heat or Cold intolerance, polydipsia, polyphagia  Heme/Lymph: no LN enlargement, no easy bruising or bleeding           Vital Signs Last 24 Hrs  T(C): 36.8 (28 Dec 2018 21:11), Max: 36.9 (28 Dec 2018 19:39)  T(F): 98.3 (28 Dec 2018 21:11), Max: 98.4 (28 Dec 2018 19:39)  HR: 78 (28 Dec 2018 21:11) (75 - 84)  BP: 122/64 (28 Dec 2018 21:11) (106/62 - 122/64)  BP(mean): --  RR: 18 (28 Dec 2018 21:11) (18 - 18)  SpO2: 94% (28 Dec 2018 21:11) (91% - 94%)    PHYSICAL EXAM:    Constitutional: awake and alert.  HEENT: PERRLA, EOMI,   Neck: Supple.  Respiratory: Breath sounds are clear bilaterally  Cardiovascular: S1 and S2, regular / irregular rhythm  Gastrointestinal: soft, nontender  Extremities:  no edema  Vascular: Carotid Bruit - no  Musculoskeletal: no joint swelling/tenderness, no abnormal movements  Skin: No rashes    Neurological exam:  HF: A x O x 3. Appropriately interactive, normal affect. Speech fluent, No Aphasia or paraphasic errors. Naming /repetition intact   CN: JOE, EOMI variably weak in left conjugate gaze, VFF, facial sensation normal, no NLFD, tongue midline, Palate moves equally, SCM equal bilaterally  Motor: No pronator drift, Strength 3/5 in all 4 ext, normal bulk and tone, no tremor, rigidity or bradykinesia.    Sens: Intact to light touch / PP/ VS/ JS    Reflexes: Symmetric and normal . BJ 2+, BR 2+, KJ 1+, AJ1+, downgoing toes b/l  Coord:  No FNFA, dysmetria, JUAN intact but demonstrates past pointing  Gait/Balance: cannot stand with feet together, cannot walk tandem        LABS:                        13.3   10.5  )-----------( 242      ( 28 Dec 2018 06:08 )             41.3     12-28    140  |  105  |  23<H>  ----------------------------<  179<H>  3.9   |  27  |  0.97    Ca    8.3<L>      28 Dec 2018 06:08  Phos  2.5     12-28  Mg     2.2     12-28      PT/INR - ( 28 Dec 2018 06:08 )   PT: 11.8 sec;   INR: 1.06 ratio         PTT - ( 28 Dec 2018 06:08 )  PTT:28.4 sec      RADIOLOGY & ADDITIONAL TESTS:  < from: MR Head w/ IV Cont (12.26.18 @ 12:56) >  EXAM:  MR BRAIN IC                            PROCEDURE DATE:  12/26/2018          INTERPRETATION:  CLINICAL STATEMENT: Rule out cerebellar stroke. Dizziness    TECHNIQUE: MRI of the brain was performed with and without gadolinium.    COMPARISON: CT head 12/23/2018.    FINDINGS:  There is no abnormal intraparenchymal or leptomeningeal enhancement.    There is mild diffuse parenchymal volume loss. There are T2 prolongation   signal abnormalities in the periventricular subcortical white matter   likely related to mild chronic microvascular ischemic changes.    There is no acute parenchymal hemorrhage, parenchymal mass, mass effect   or midline shift. There is no extra-axial fluid collection.  There is no   hydrocephalus.  There is no acute infarct.    Partial opacification mastoid air cells    IMPRESSION:  No parenchymal mass, acute intracranial hemorrhage or acute infarct.      MARY ANN COLEMAN M.D., ATTENDING RADIOLOGIST    < end of copied text >  < from: MR Angio Head No Cont (12.26.18 @ 12:55) >  EXAM:  MR ANGIO BRAIN                            *** ADDENDUM 12/28/2018  ***    Additional history: Vertigo      *** END OF ADDENDUM 12/28/2018  ***      PROCEDURE DATE:  12/26/2018          INTERPRETATION:  CLINICAL STATEMENT: Evaluate for stenosis. CVA    TECHNIQUE: MRA of the head was performed without gadolinium. 3-D MIP   images were obtained.    COMPARISON: None.    FINDINGS:  The proximal anterior, middle and posterior cerebral arteries are patent.   Attenuation distal left posterior cerebral arteries noted.    The intracranial vertebral and basilar arteries are patent. Dominant left   vertebral artery.    There is no MR evidence of intracranial aneurysm. Infundibulum at the   origin of fetal left posterior cerebral artery.    IMPRESSION:  Attenuation distal left posterior cerebral artery.      ***Please see the addendum at the top of this report. It may contain   additional important information or changes.****    MARY ANN COLEMAN M.D., ATTENDING RADIOLOGIST  This document has been electronically signed. Dec 26 2018  1:20PM  Addend:  MARY ANN COLEMAN M.D., ATTENDING RADIOLOGIST  This addendum was electronically signed on: Dec 28 2018  8:03AM.        < end of copied text >

## 2018-12-28 NOTE — PROGRESS NOTE ADULT - ASSESSMENT
Worsening of weakness in legs with loss of DTR and knees with no significant abnormality on MRI brain. Recommend LP for CSF protein and paraneoplastic serology to investigate GBS immune-mediated neuropath. AChR antibody is pending for MG LP performed

## 2018-12-28 NOTE — PROGRESS NOTE ADULT - PROBLEM SELECTOR PLAN 2
Patient w/ Hx of hernia repair 10yrs ago, denies abdominal pain  CT A/P: 4.7 x 0.7 x 4.0 cm thick-walled fluid collection within the anterior   abdominal and an umbilical hernia repair site. No significant surrounding   inflammation.  Surgery consulted: recc med management, will follow up  Patient c/o burning abdominal pain  -GI Dr Guerrero,   -c/w maalox and ppi, eventual EGD/colonoscopy

## 2018-12-28 NOTE — PROGRESS NOTE ADULT - SUBJECTIVE AND OBJECTIVE BOX
Patient is a 78y old  Male who presents with a chief complaint of dizziness (28 Dec 2018 11:26)    pt seen in icu [  ], reg med floor [ x  ], bed [ x ], chair at bedside [   ], a+o x3 [ x ], lethargic [  ],  nad [ x ]        Allergies    penicillin (Hives)        Vitals    T(F): 97.7 (12-28-18 @ 14:50), Max: 98 (12-27-18 @ 19:19)  HR: 75 (12-28-18 @ 14:50) (75 - 93)  BP: 106/62 (12-28-18 @ 14:50) (100/42 - 121/49)  RR: 18 (12-28-18 @ 14:50) (18 - 18)  SpO2: 91% (12-28-18 @ 14:50) (91% - 95%)  Wt(kg): --  CAPILLARY BLOOD GLUCOSE      POCT Blood Glucose.: 168 mg/dL (28 Dec 2018 12:19)      Labs                          13.3   10.5  )-----------( 242      ( 28 Dec 2018 06:08 )             41.3       12-28    140  |  105  |  23<H>  ----------------------------<  179<H>  3.9   |  27  |  0.97    Ca    8.3<L>      28 Dec 2018 06:08  Phos  2.5     12-28  Mg     2.2     12-28              .Urine Clean Catch (Midstream)  12-24 @ 10:58   <10,000 CFU/ml Normal Urogenital juliane present  --  --          Radiology Results      Meds    MEDICATIONS  (STANDING):  artificial  tears Solution 1 Drop(s) Both EYES three times a day  atorvastatin 40 milliGRAM(s) Oral at bedtime  cyanocobalamin 1000 MICROGram(s) Oral daily  ergocalciferol 13640 Unit(s) Oral <User Schedule>  escitalopram 20 milliGRAM(s) Oral daily  insulin lispro (HumaLOG) corrective regimen sliding scale   SubCutaneous Before meals and at bedtime  levoFLOXacin  Tablet 500 milliGRAM(s) Oral every 24 hours  lidocaine 1% Injectable 10 milliLiter(s) Local Injection once  meclizine 25 milliGRAM(s) Oral every 8 hours  pantoprazole    Tablet 40 milliGRAM(s) Oral before breakfast  risperiDONE   Tablet 0.5 milliGRAM(s) Oral daily      MEDICATIONS  (PRN):  aluminum hydroxide/magnesium hydroxide/simethicone Suspension 30 milliLiter(s) Oral every 6 hours PRN Dyspepsia      Physical Exam    Neuro :  no focal deficits  Respiratory: CTA B/L  CV: RRR, S1S2, no murmurs,   Abdominal: Soft, NT, ND +BS,  Extremities: No edema, + peripheral pulses    ASSESSMENT    Dizziness and giddiness   r/o cns path  vasovagal episode possibly 2nd acute drug reaction,   abdominal wall fluid collection,   h/o Anxiety  Depression  HLD (hyperlipidemia)  DM (diabetes mellitus)  HTN (hypertension)  No significant past surgical history      PLAN      ct head neg  neuro f/u  MRI brain and MRA head neg for acute infarct or aneurysm, but with partial opacification of mastoid air cells noted above  f/u acetylcholine receptor antibody to eval for myasthenia gravis  pt to have spinal tap  pt to start on ivig  cont aspirin atorvastatin  cont meclizine  cont levaquin 500mg daily x 7 days for possible mastoiditis  cardio f/u noted  d/c lisinopril 10 mg daily 2nd to low bp  gi cons noted  Epigastric burning; ? gastritis/ulcer  c/w maalox prn and PPI daily  avoid NSAID's  bowel regimen  eventual EGD/Colonoscopy.  surg cons noted  cont current meds

## 2018-12-28 NOTE — PROGRESS NOTE ADULT - SUBJECTIVE AND OBJECTIVE BOX
CHIEF COMPLAINT:Patient is a 78y old  Male who presents with a chief complaint of dizziness.Pt appears comfortable,still dizzy.    	  REVIEW OF SYSTEMS:  CONSTITUTIONAL: No fever, weight loss, or fatigue  EYES: No eye pain, visual disturbances, or discharge  ENT:  No difficulty hearing, tinnitus, vertigo; No sinus or throat pain  NECK: No pain or stiffness  RESPIRATORY: No cough, wheezing, chills or hemoptysis; No Shortness of Breath  CARDIOVASCULAR: No chest pain, palpitations, passing out, dizziness, or leg swelling  GASTROINTESTINAL: No abdominal or epigastric pain. No nausea, vomiting, or hematemesis; No diarrhea or constipation. No melena or hematochezia.  GENITOURINARY: No dysuria, frequency, hematuria, or incontinence  NEUROLOGICAL: No headaches, memory loss, loss of strength, numbness, or tremors  SKIN: No itching, burning, rashes, or lesions   LYMPH Nodes: No enlarged glands  ENDOCRINE: No heat or cold intolerance; No hair loss  MUSCULOSKELETAL: No joint pain or swelling; No muscle, back, or extremity pain  PSYCHIATRIC: No depression, anxiety, mood swings, or difficulty sleeping  HEME/LYMPH: No easy bruising, or bleeding gums  ALLERGY AND IMMUNOLOGIC: No hives or eczema	      PHYSICAL EXAM:  T(C): 36.4 (12-28-18 @ 10:11), Max: 36.7 (12-27-18 @ 19:19)  HR: 75 (12-28-18 @ 10:11) (73 - 93)  BP: 118/68 (12-28-18 @ 10:11) (100/42 - 121/49)  RR: 18 (12-28-18 @ 10:11) (18 - 18)  SpO2: 93% (12-28-18 @ 10:11) (93% - 95%)      Appearance: Normal	  HEENT:   Normal oral mucosa, PERRL, EOMI	  Lymphatic: No lymphadenopathy  Cardiovascular: Normal S1 S2, No JVD, No murmurs, No edema  Respiratory: Lungs clear to auscultation	  Psychiatry: A & O x 3, Mood & affect appropriate  Gastrointestinal:  Soft, Non-tender, + BS	  Skin: No rashes, No ecchymoses, No cyanosis	  Extremities: Normal range of motion, No clubbing, cyanosis or edema  Vascular: Peripheral pulses palpable 2+ bilaterally    MEDICATIONS  (STANDING):  artificial  tears Solution 1 Drop(s) Both EYES three times a day  atorvastatin 40 milliGRAM(s) Oral at bedtime  cyanocobalamin 1000 MICROGram(s) Oral daily  ergocalciferol 40664 Unit(s) Oral <User Schedule>  escitalopram 20 milliGRAM(s) Oral daily  insulin lispro (HumaLOG) corrective regimen sliding scale   SubCutaneous Before meals and at bedtime  levoFLOXacin  Tablet 500 milliGRAM(s) Oral every 24 hours  lidocaine 1% Injectable 10 milliLiter(s) Local Injection once  meclizine 25 milliGRAM(s) Oral every 8 hours  pantoprazole    Tablet 40 milliGRAM(s) Oral before breakfast  risperiDONE   Tablet 0.5 milliGRAM(s) Oral daily  sodium chloride 0.9%. 1000 milliLiter(s) (60 mL/Hr) IV Continuous <Continuous>        	  LABS:	 	                      13.3   10.5  )-----------( 242      ( 28 Dec 2018 06:08 )             41.3     12-28    140  |  105  |  23<H>  ----------------------------<  179<H>  3.9   |  27  |  0.97    Ca    8.3<L>      28 Dec 2018 06:08  Phos  2.5     12-28  Mg     2.2     12-28      proBNP: Serum Pro-Brain Natriuretic Peptide: 59 pg/mL (12-23 @ 23:00)    Lipid Profile: Cholesterol 200    HDL 43      HgA1c: Hemoglobin A1C, Whole Blood: 7.9 % (12-24 @ 10:48)    TSH: Thyroid Stimulating Hormone, Serum: 2.39 uU/mL (12-24 @ 04:53)      	    OBSERVATIONS:  Mitral Valve: Normal mitral valve. Trace mitral  regurgitation.  Aortic Root: Normal aortic root.  Aortic Valve: Calcified aortic valve not well visualized.  Peak transaortic valve gradient equals 13.4 mm Hg, mean  transaortic valve gradient equals 7 mm Hg, estimated aortic  valve area equals 1.6 sqcm (by continuity equation),  consistent with mild aortic stenosis. No aortic valve  regurgitation seen.  Left Atrium: Normal left atrium.  LA volume index = 23  cc/m2.  Left Ventricle: Endocardium not well visualized; grossly  normal left ventricular systolic function. Normal left  ventricular internal dimensions and wall thicknesses. Grade  I diastolic dysfunction (Impaired relaxation).  Right Heart: Right atrium not well visualized.Right  ventricle not well visualized. Normal RV systolic function  (TAPSE 2.5 cm). Normal tricuspid valve. Trace tricuspid  regurgitation. Pulmonic valve not well seen.  Pericardium/PleuraNo pericardial effusion. A prominent  epicardial fat pad is noted.  Hemodynamic: Insufficient tricuspid regurgitation jet to  allow calculation of RVSP.

## 2018-12-28 NOTE — PROGRESS NOTE ADULT - ASSESSMENT
78 yr old Male , from home, AAOx3, w/ PMHx T2DM, HTN, HLD, Depression who presents to the ED c/o dizziness ,nausea and vomiting.  1.Tele monitoring.  2.Neurology work-up-p.  3.HTN-D/C lisnopril.  4.Orthostatic BP +  5.DM-insulin.  6.Cont asa,statin.  7.Gi and DVT prophylaxis.

## 2018-12-29 LAB
GLUCOSE BLDC GLUCOMTR-MCNC: 130 MG/DL — HIGH (ref 70–99)
GLUCOSE BLDC GLUCOMTR-MCNC: 132 MG/DL — HIGH (ref 70–99)
GLUCOSE BLDC GLUCOMTR-MCNC: 161 MG/DL — HIGH (ref 70–99)
GLUCOSE BLDC GLUCOMTR-MCNC: 204 MG/DL — HIGH (ref 70–99)
N-METHYL-DA RECEPTOR AB IGG BY CBA-IFA, SERUM W/RFLX TITER: SIGNIFICANT CHANGE UP

## 2018-12-29 RX ORDER — IMMUNE GLOBULIN (HUMAN) 10 G/100ML
27.36 INJECTION INTRAVENOUS; SUBCUTANEOUS DAILY
Qty: 0 | Refills: 0 | Status: DISCONTINUED | OUTPATIENT
Start: 2018-12-29 | End: 2018-12-29

## 2018-12-29 RX ORDER — IMMUNE GLOBULIN (HUMAN) 10 G/100ML
30 INJECTION INTRAVENOUS; SUBCUTANEOUS EVERY 24 HOURS
Qty: 0 | Refills: 0 | Status: DISCONTINUED | OUTPATIENT
Start: 2018-12-29 | End: 2019-01-01

## 2018-12-29 RX ORDER — IMMUNE GLOBULIN (HUMAN) 10 G/100ML
27.36 INJECTION INTRAVENOUS; SUBCUTANEOUS EVERY 24 HOURS
Qty: 0 | Refills: 0 | Status: DISCONTINUED | OUTPATIENT
Start: 2018-12-29 | End: 2018-12-29

## 2018-12-29 RX ADMIN — PREGABALIN 1000 MICROGRAM(S): 225 CAPSULE ORAL at 11:29

## 2018-12-29 RX ADMIN — Medication 1 DROP(S): at 05:57

## 2018-12-29 RX ADMIN — Medication 1: at 12:38

## 2018-12-29 RX ADMIN — ATORVASTATIN CALCIUM 40 MILLIGRAM(S): 80 TABLET, FILM COATED ORAL at 21:30

## 2018-12-29 RX ADMIN — Medication 2: at 21:31

## 2018-12-29 RX ADMIN — Medication 1 DROP(S): at 13:09

## 2018-12-29 RX ADMIN — PANTOPRAZOLE SODIUM 40 MILLIGRAM(S): 20 TABLET, DELAYED RELEASE ORAL at 05:58

## 2018-12-29 RX ADMIN — ESCITALOPRAM OXALATE 20 MILLIGRAM(S): 10 TABLET, FILM COATED ORAL at 13:09

## 2018-12-29 RX ADMIN — Medication 25 MILLIGRAM(S): at 21:30

## 2018-12-29 RX ADMIN — Medication 25 MILLIGRAM(S): at 05:57

## 2018-12-29 RX ADMIN — IMMUNE GLOBULIN (HUMAN) 68.4 GRAM(S): 10 INJECTION INTRAVENOUS; SUBCUTANEOUS at 00:48

## 2018-12-29 RX ADMIN — Medication 1 DROP(S): at 21:30

## 2018-12-29 RX ADMIN — IMMUNE GLOBULIN (HUMAN) 75 GRAM(S): 10 INJECTION INTRAVENOUS; SUBCUTANEOUS at 20:09

## 2018-12-29 RX ADMIN — Medication 25 MILLIGRAM(S): at 13:09

## 2018-12-29 RX ADMIN — RISPERIDONE 0.5 MILLIGRAM(S): 4 TABLET ORAL at 11:29

## 2018-12-29 NOTE — PROGRESS NOTE ADULT - SUBJECTIVE AND OBJECTIVE BOX
PGY1 Note discussed with supervising resident and primary attending.    Patient is a 78y old  Male who presents with a chief complaint of dizziness (29 Dec 2018 10:43)      INTERVAL HPI/OVERNIGHT EVENTS: Patient had a spinal tap, csf studies sent. Started on ivig. Continues to have vertigo, however left eye ptosis noted to be improving    MEDICATIONS  (STANDING):  artificial  tears Solution 1 Drop(s) Both EYES three times a day  atorvastatin 40 milliGRAM(s) Oral at bedtime  cyanocobalamin 1000 MICROGram(s) Oral daily  ergocalciferol 15287 Unit(s) Oral <User Schedule>  escitalopram 20 milliGRAM(s) Oral daily  immune   globulin 10% (GAMMAGARD) IVPB 30 Gram(s) IV Intermittent every 24 hours  insulin lispro (HumaLOG) corrective regimen sliding scale   SubCutaneous Before meals and at bedtime  levoFLOXacin  Tablet 500 milliGRAM(s) Oral every 24 hours  meclizine 25 milliGRAM(s) Oral every 8 hours  pantoprazole    Tablet 40 milliGRAM(s) Oral before breakfast  risperiDONE   Tablet 0.5 milliGRAM(s) Oral daily    MEDICATIONS  (PRN):  aluminum hydroxide/magnesium hydroxide/simethicone Suspension 30 milliLiter(s) Oral every 6 hours PRN Dyspepsia      Allergies    penicillin (Hives)    Intolerances        REVIEW OF SYSTEMS:  CONSTITUTIONAL: No fever, weight loss  RESPIRATORY: No cough, wheezing, chills or hemoptysis; No shortness of breath  CARDIOVASCULAR: No chest pain, palpitations,  or leg swelling  GASTROINTESTINAL: Abdominal pain, constipation  NEUROLOGICAL: No headaches, memory loss, loss of strength, numbness, or tremors  SKIN: No itching, burning, rashes, or lesions     Vital Signs Last 24 Hrs  T(C): 36.2 (29 Dec 2018 05:29), Max: 36.9 (28 Dec 2018 19:39)  T(F): 97.1 (29 Dec 2018 05:29), Max: 98.4 (28 Dec 2018 19:39)  HR: 74 (29 Dec 2018 03:11) (74 - 84)  BP: 128/64 (29 Dec 2018 03:11) (106/62 - 128/64)  BP(mean): --  RR: 18 (29 Dec 2018 05:29) (18 - 18)  SpO2: 95% (29 Dec 2018 05:29) (91% - 95%)    PHYSICAL EXAM:  GENERAL: NAD, well-groomed, well-developed  HEAD:  Atraumatic, Normocephalic  EYES: EOMI, PERRLA, conjunctiva and sclera clear  NECK: Supple, No JVD, Normal thyroid  CHEST/LUNG: Clear to percussion bilaterally; No rales, rhonchi, wheezing, or rubs  HEART: Regular rate and rhythm; No murmurs, rubs, or gallops  ABDOMEN: Soft, diffusely tender, distended; Bowel sounds present  NERVOUS SYSTEM:  Alert & Oriented X3, Good concentration; Motor Strength 3/5 B/LE   EXTREMITIES:  2+ Peripheral Pulses, No clubbing, cyanosis, or edema      LABS:                        13.3   10.5  )-----------( 242      ( 28 Dec 2018 06:08 )             41.3     12-28    140  |  105  |  23<H>  ----------------------------<  179<H>  3.9   |  27  |  0.97    Ca    8.3<L>      28 Dec 2018 06:08  Phos  2.5     12-28  Mg     2.2     12-28      PT/INR - ( 28 Dec 2018 06:08 )   PT: 11.8 sec;   INR: 1.06 ratio         PTT - ( 28 Dec 2018 06:08 )  PTT:28.4 sec    CAPILLARY BLOOD GLUCOSE      POCT Blood Glucose.: 132 mg/dL (29 Dec 2018 08:14)  POCT Blood Glucose.: 159 mg/dL (28 Dec 2018 21:50)  POCT Blood Glucose.: 127 mg/dL (28 Dec 2018 16:55)  POCT Blood Glucose.: 168 mg/dL (28 Dec 2018 12:19)        Consultant(s) Notes Reviewed:  [ x ] YES  [ ] NO

## 2018-12-29 NOTE — PROGRESS NOTE ADULT - SUBJECTIVE AND OBJECTIVE BOX
Patient is a 78y old  Male who presents with a chief complaint of dizziness (28 Dec 2018 23:59)    pt seen in icu [  ], reg med floor [   ], bed [  ], chair at bedside [   ], a+o x3 [  ], lethargic [  ],  nad [  ]    olson [  ], ngt [  ], peg [  ], et tube [  ], cent line [  ], picc line [  ]        Allergies    penicillin (Hives)        Vitals    T(F): 97.1 (12-29-18 @ 05:29), Max: 98.4 (12-28-18 @ 19:39)  HR: 74 (12-29-18 @ 03:11) (74 - 84)  BP: 128/64 (12-29-18 @ 03:11) (106/62 - 128/64)  RR: 18 (12-29-18 @ 05:29) (18 - 18)  SpO2: 95% (12-29-18 @ 05:29) (91% - 95%)  Wt(kg): --  CAPILLARY BLOOD GLUCOSE      POCT Blood Glucose.: 159 mg/dL (28 Dec 2018 21:50)      Labs                          13.3   10.5  )-----------( 242      ( 28 Dec 2018 06:08 )             41.3       12-28    140  |  105  |  23<H>  ----------------------------<  179<H>  3.9   |  27  |  0.97    Ca    8.3<L>      28 Dec 2018 06:08  Phos  2.5     12-28  Mg     2.2     12-28              .Urine Clean Catch (Midstream)  12-24 @ 10:58   <10,000 CFU/ml Normal Urogenital juliane present  --  --          Radiology Results      Meds    MEDICATIONS  (STANDING):  artificial  tears Solution 1 Drop(s) Both EYES three times a day  atorvastatin 40 milliGRAM(s) Oral at bedtime  cyanocobalamin 1000 MICROGram(s) Oral daily  ergocalciferol 47244 Unit(s) Oral <User Schedule>  escitalopram 20 milliGRAM(s) Oral daily  immune   globulin 10% (GAMMAGARD) IVPB 27.36 Gram(s) IV Intermittent daily  insulin lispro (HumaLOG) corrective regimen sliding scale   SubCutaneous Before meals and at bedtime  levoFLOXacin  Tablet 500 milliGRAM(s) Oral every 24 hours  meclizine 25 milliGRAM(s) Oral every 8 hours  pantoprazole    Tablet 40 milliGRAM(s) Oral before breakfast  risperiDONE   Tablet 0.5 milliGRAM(s) Oral daily      MEDICATIONS  (PRN):  aluminum hydroxide/magnesium hydroxide/simethicone Suspension 30 milliLiter(s) Oral every 6 hours PRN Dyspepsia      Physical Exam    Neuro :  no focal deficits  Respiratory: CTA B/L  CV: RRR, S1S2, no murmurs,   Abdominal: Soft, NT, ND +BS,  Extremities: No edema, + peripheral pulses    ASSESSMENT    Dizziness and giddiness  Anxiety  Depression  HLD (hyperlipidemia)  DM (diabetes mellitus)  HTN (hypertension)  No significant past surgical history      PLAN Patient is a 78y old  Male who presents with a chief complaint of dizziness (28 Dec 2018 23:59)    pt seen in icu [  ], reg med floor [ x  ], bed [ x ], chair at bedside [   ], a+o x3 [ x ], lethargic [  ],  nad [ x ]      Allergies    penicillin (Hives)        Vitals    T(F): 97.1 (12-29-18 @ 05:29), Max: 98.4 (12-28-18 @ 19:39)  HR: 74 (12-29-18 @ 03:11) (74 - 84)  BP: 128/64 (12-29-18 @ 03:11) (106/62 - 128/64)  RR: 18 (12-29-18 @ 05:29) (18 - 18)  SpO2: 95% (12-29-18 @ 05:29) (91% - 95%)  Wt(kg): --  CAPILLARY BLOOD GLUCOSE      POCT Blood Glucose.: 159 mg/dL (28 Dec 2018 21:50)      Labs                          13.3   10.5  )-----------( 242      ( 28 Dec 2018 06:08 )             41.3       12-28    140  |  105  |  23<H>  ----------------------------<  179<H>  3.9   |  27  |  0.97    Ca    8.3<L>      28 Dec 2018 06:08  Phos  2.5     12-28  Mg     2.2     12-28              .Urine Clean Catch (Midstream)  12-24 @ 10:58   <10,000 CFU/ml Normal Urogenital juliane present  --  --          Radiology Results      Meds    MEDICATIONS  (STANDING):  artificial  tears Solution 1 Drop(s) Both EYES three times a day  atorvastatin 40 milliGRAM(s) Oral at bedtime  cyanocobalamin 1000 MICROGram(s) Oral daily  ergocalciferol 82533 Unit(s) Oral <User Schedule>  escitalopram 20 milliGRAM(s) Oral daily  immune   globulin 10% (GAMMAGARD) IVPB 27.36 Gram(s) IV Intermittent daily  insulin lispro (HumaLOG) corrective regimen sliding scale   SubCutaneous Before meals and at bedtime  levoFLOXacin  Tablet 500 milliGRAM(s) Oral every 24 hours  meclizine 25 milliGRAM(s) Oral every 8 hours  pantoprazole    Tablet 40 milliGRAM(s) Oral before breakfast  risperiDONE   Tablet 0.5 milliGRAM(s) Oral daily      MEDICATIONS  (PRN):  aluminum hydroxide/magnesium hydroxide/simethicone Suspension 30 milliLiter(s) Oral every 6 hours PRN Dyspepsia      Physical Exam      Neuro :  no focal deficits  Respiratory: CTA B/L  CV: RRR, S1S2, no murmurs,   Abdominal: Soft, NT, ND +BS,  Extremities: No edema, + peripheral pulses    ASSESSMENT    Dizziness and giddiness   r/o cns path  vasovagal episode possibly 2nd acute drug reaction,   abdominal wall fluid collection,   h/o Anxiety  Depression  HLD (hyperlipidemia)  DM (diabetes mellitus)  HTN (hypertension)  No significant past surgical history      PLAN      ct head neg  neuro f/u  MRI brain and MRA head neg for acute infarct or aneurysm, but with partial opacification of mastoid air cells noted above  f/u acetylcholine receptor antibody to eval for myasthenia gravis  s/p spinal tap  f/u fluid study  pt started on ivig day 2/5  cont aspirin atorvastatin  cont meclizine  cont levaquin 500mg daily x 7 days for possible mastoiditis  cardio f/u noted  d/c lisinopril 10 mg daily 2nd to low bp  gi cons noted  Epigastric burning; ? gastritis/ulcer  c/w maalox prn and PPI daily  avoid NSAID's  bowel regimen  eventual EGD/Colonoscopy.  surg cons noted  cont current meds

## 2018-12-29 NOTE — PROGRESS NOTE ADULT - SUBJECTIVE AND OBJECTIVE BOX
CHIEF COMPLAINT:Patient is a 78y old  Male who presents with a chief complaint of dizziness.Pt appears comfortable.    	  REVIEW OF SYSTEMS:  CONSTITUTIONAL: No fever, weight loss, or fatigue  EYES: No eye pain, visual disturbances, or discharge  ENT:  No difficulty hearing, tinnitus, vertigo; No sinus or throat pain  NECK: No pain or stiffness  RESPIRATORY: No cough, wheezing, chills or hemoptysis; No Shortness of Breath  CARDIOVASCULAR: No chest pain, palpitations, passing out, dizziness, or leg swelling  GASTROINTESTINAL: No abdominal or epigastric pain. No nausea, vomiting, or hematemesis; No diarrhea or constipation. No melena or hematochezia.  GENITOURINARY: No dysuria, frequency, hematuria, or incontinence  NEUROLOGICAL: No headaches, memory loss, loss of strength, numbness, or tremors  SKIN: No itching, burning, rashes, or lesions   LYMPH Nodes: No enlarged glands  ENDOCRINE: No heat or cold intolerance; No hair loss  MUSCULOSKELETAL: No joint pain or swelling; No muscle, back, or extremity pain  PSYCHIATRIC: No depression, anxiety, mood swings, or difficulty sleeping  HEME/LYMPH: No easy bruising, or bleeding gums  ALLERGY AND IMMUNOLOGIC: No hives or eczema	      PHYSICAL EXAM:  T(C): 36.2 (12-29-18 @ 05:29), Max: 36.9 (12-28-18 @ 19:39)  HR: 74 (12-29-18 @ 03:11) (74 - 84)  BP: 128/64 (12-29-18 @ 03:11) (106/62 - 128/64)  RR: 18 (12-29-18 @ 05:29) (18 - 18)  SpO2: 95% (12-29-18 @ 05:29) (91% - 95%)  Wt(kg): --  I&O's Summary    28 Dec 2018 07:01  -  29 Dec 2018 07:00  --------------------------------------------------------  IN: 0 mL / OUT: 275 mL / NET: -275 mL    29 Dec 2018 07:01  -  29 Dec 2018 10:43  --------------------------------------------------------  IN: 0 mL / OUT: 350 mL / NET: -350 mL        Appearance: Normal	  HEENT:   Normal oral mucosa, PERRL, EOMI	  Lymphatic: No lymphadenopathy  Cardiovascular: Normal S1 S2, No JVD, No murmurs, No edema  Respiratory: Lungs clear to auscultation	  Psychiatry: A & O x 3, Mood & affect appropriate  Gastrointestinal:  Soft, Non-tender, + BS	  Skin: No rashes, No ecchymoses, No cyanosis	  Extremities: Normal range of motion, No clubbing, cyanosis or edema  Vascular: Peripheral pulses palpable 2+ bilaterally    MEDICATIONS  (STANDING):  artificial  tears Solution 1 Drop(s) Both EYES three times a day  atorvastatin 40 milliGRAM(s) Oral at bedtime  cyanocobalamin 1000 MICROGram(s) Oral daily  ergocalciferol 68088 Unit(s) Oral <User Schedule>  escitalopram 20 milliGRAM(s) Oral daily  immune   globulin 10% (GAMMAGARD) IVPB 27.36 Gram(s) IV Intermittent daily  insulin lispro (HumaLOG) corrective regimen sliding scale   SubCutaneous Before meals and at bedtime  levoFLOXacin  Tablet 500 milliGRAM(s) Oral every 24 hours  meclizine 25 milliGRAM(s) Oral every 8 hours  pantoprazole    Tablet 40 milliGRAM(s) Oral before breakfast  risperiDONE   Tablet 0.5 milliGRAM(s) Oral daily    	  LABS:	 	                      13.3   10.5  )-----------( 242      ( 28 Dec 2018 06:08 )             41.3     12-28    140  |  105  |  23<H>  ----------------------------<  179<H>  3.9   |  27  |  0.97    Ca    8.3<L>      28 Dec 2018 06:08  Phos  2.5     12-28  Mg     2.2     12-28      proBNP: Serum Pro-Brain Natriuretic Peptide: 59 pg/mL (12-23 @ 23:00)    Lipid Profile: Cholesterol 200    HDL 43      HgA1c: Hemoglobin A1C, Whole Blood: 7.9 % (12-24 @ 10:48)    TSH: Thyroid Stimulating Hormone, Serum: 2.39 uU/mL (12-24 @ 04:53)      	  Protein, CSF (12.28.18 @ 17:39)    Protein, CSF: 49 mg/dL    Glucose, CSF (12.28.18 @ 17:39)    Glucose, CSF: 97 mg/dL    Cerebrospinal Fluid Cell Count-1 (12.28.18 @ 17:37)    Total Nucleated Cell Count, CSF: 1 /uL    CSF Color: No Color    CSF Appearance: Clear    CSF Lymphocytes: 100: Differential is based on 2 cells counted after cytocentrifugation. %    CSF Segmented Neutrophils: 0 %

## 2018-12-29 NOTE — PROGRESS NOTE ADULT - PROBLEM SELECTOR PLAN 1
Patient p/w dizziness, diaphoresis, nausea and face flushing after taking Cefuroxime. Symptoms most likely due to allergic reaction.   ECG: NSR VR@92bpm T1: negative  CT brain: no acute pathology  MRI/MRA: chronic microvascular ischemic changes; Attenuation distal left posterior cerebral artery.   Echo: grossly normal LV function, mild aortic stenosis, grade 1 DD  Neuro Dr Forrest: recc paraneoplastic antibody profile; spinal tap  Cardio Dr Mcdowell: tele, orthostats q shift, lisinopril discontinued  -f/u CSF studies  -c/w IVIG D2/5 Patient p/w dizziness, diaphoresis, nausea and face flushing after taking Cefuroxime. Symptoms most likely due to allergic reaction.   ECG: NSR VR@92bpm T1: negative  CT brain: no acute pathology  MRI/MRA: chronic microvascular ischemic changes; Attenuation distal left posterior cerebral artery. Possible mastoiditis.  Echo: grossly normal LV function, mild aortic stenosis, grade 1 DD  Neuro Dr Forrest: recc paraneoplastic antibody profile; spinal tap  Cardio Dr Mcdowell: tele, orthostats q shift, lisinopril discontinued  -f/u CSF studies  -c/w IVIG D2/5  -c/w levaquin D2/7

## 2018-12-29 NOTE — PROGRESS NOTE ADULT - ASSESSMENT
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  1.Tele monitoring.  2.Neurology f/u.  3.S/P LP and started on IV IG.  4.Orthostatic BP +  5.DM-insulin.  6.Cont asa,statin.  7.Gi and DVT prophylaxis.

## 2018-12-30 LAB
GLUCOSE BLDC GLUCOMTR-MCNC: 140 MG/DL — HIGH (ref 70–99)
GLUCOSE BLDC GLUCOMTR-MCNC: 144 MG/DL — HIGH (ref 70–99)
GLUCOSE BLDC GLUCOMTR-MCNC: 152 MG/DL — HIGH (ref 70–99)
GLUCOSE BLDC GLUCOMTR-MCNC: 179 MG/DL — HIGH (ref 70–99)
VGCC-P/Q BIND AB SER-SCNC: 0 PMOL/L — SIGNIFICANT CHANGE UP (ref 0–24.5)

## 2018-12-30 PROCEDURE — 99233 SBSQ HOSP IP/OBS HIGH 50: CPT

## 2018-12-30 RX ORDER — HEPARIN SODIUM 5000 [USP'U]/ML
5000 INJECTION INTRAVENOUS; SUBCUTANEOUS EVERY 12 HOURS
Qty: 0 | Refills: 0 | Status: DISCONTINUED | OUTPATIENT
Start: 2018-12-30 | End: 2019-01-08

## 2018-12-30 RX ORDER — SENNA PLUS 8.6 MG/1
2 TABLET ORAL AT BEDTIME
Qty: 0 | Refills: 0 | Status: DISCONTINUED | OUTPATIENT
Start: 2018-12-30 | End: 2019-01-08

## 2018-12-30 RX ORDER — DOCUSATE SODIUM 100 MG
100 CAPSULE ORAL DAILY
Qty: 0 | Refills: 0 | Status: DISCONTINUED | OUTPATIENT
Start: 2018-12-30 | End: 2019-01-08

## 2018-12-30 RX ADMIN — Medication 25 MILLIGRAM(S): at 21:40

## 2018-12-30 RX ADMIN — Medication 1: at 17:24

## 2018-12-30 RX ADMIN — Medication 25 MILLIGRAM(S): at 13:07

## 2018-12-30 RX ADMIN — PREGABALIN 1000 MICROGRAM(S): 225 CAPSULE ORAL at 11:32

## 2018-12-30 RX ADMIN — PANTOPRAZOLE SODIUM 40 MILLIGRAM(S): 20 TABLET, DELAYED RELEASE ORAL at 05:25

## 2018-12-30 RX ADMIN — Medication 1 DROP(S): at 05:25

## 2018-12-30 RX ADMIN — RISPERIDONE 0.5 MILLIGRAM(S): 4 TABLET ORAL at 11:32

## 2018-12-30 RX ADMIN — ESCITALOPRAM OXALATE 20 MILLIGRAM(S): 10 TABLET, FILM COATED ORAL at 11:32

## 2018-12-30 RX ADMIN — Medication 1 DROP(S): at 13:07

## 2018-12-30 RX ADMIN — Medication 100 MILLIGRAM(S): at 17:24

## 2018-12-30 RX ADMIN — Medication 1: at 12:36

## 2018-12-30 RX ADMIN — ATORVASTATIN CALCIUM 40 MILLIGRAM(S): 80 TABLET, FILM COATED ORAL at 21:40

## 2018-12-30 RX ADMIN — IMMUNE GLOBULIN (HUMAN) 75 GRAM(S): 10 INJECTION INTRAVENOUS; SUBCUTANEOUS at 22:00

## 2018-12-30 RX ADMIN — Medication 1 DROP(S): at 22:02

## 2018-12-30 RX ADMIN — Medication 25 MILLIGRAM(S): at 05:25

## 2018-12-30 RX ADMIN — SENNA PLUS 2 TABLET(S): 8.6 TABLET ORAL at 21:40

## 2018-12-30 RX ADMIN — Medication 30 MILLILITER(S): at 21:38

## 2018-12-30 NOTE — PROGRESS NOTE ADULT - SUBJECTIVE AND OBJECTIVE BOX
Patient is a 78y old  Male who presents with a chief complaint of dizziness (29 Dec 2018 11:39)    pt seen in icu [  ], reg med floor [ x  ], bed [ x ], chair at bedside [   ], a+o x3 [ x ], lethargic [  ],  nad [ x ]      Allergies    penicillin (Hives)        Vitals    T(F): 97.7 (12-30-18 @ 05:12), Max: 98.6 (12-29-18 @ 21:49)  HR: 84 (12-30-18 @ 05:12) (73 - 87)  BP: 130/65 (12-30-18 @ 05:12) (118/66 - 137/77)  RR: 18 (12-30-18 @ 05:12) (16 - 18)  SpO2: 93% (12-30-18 @ 05:12) (93% - 98%)  Wt(kg): --  CAPILLARY BLOOD GLUCOSE      POCT Blood Glucose.: 140 mg/dL (30 Dec 2018 07:44)      Labs          .Urine Clean Catch (Midstream)  12-24 @ 10:58   <10,000 CFU/ml Normal Urogenital juliane present  --  --          Radiology Results      Meds    MEDICATIONS  (STANDING):  artificial  tears Solution 1 Drop(s) Both EYES three times a day  atorvastatin 40 milliGRAM(s) Oral at bedtime  cyanocobalamin 1000 MICROGram(s) Oral daily  ergocalciferol 74209 Unit(s) Oral <User Schedule>  escitalopram 20 milliGRAM(s) Oral daily  immune   globulin 10% (GAMMAGARD) IVPB 30 Gram(s) IV Intermittent every 24 hours  insulin lispro (HumaLOG) corrective regimen sliding scale   SubCutaneous Before meals and at bedtime  levoFLOXacin  Tablet 500 milliGRAM(s) Oral every 24 hours  meclizine 25 milliGRAM(s) Oral every 8 hours  pantoprazole    Tablet 40 milliGRAM(s) Oral before breakfast  risperiDONE   Tablet 0.5 milliGRAM(s) Oral daily      MEDICATIONS  (PRN):  aluminum hydroxide/magnesium hydroxide/simethicone Suspension 30 milliLiter(s) Oral every 6 hours PRN Dyspepsia      Physical Exam    Neuro :  no focal deficits  Respiratory: CTA B/L  CV: RRR, S1S2, no murmurs,   Abdominal: Soft, NT, ND +BS,  Extremities: No edema, + peripheral pulses    ASSESSMENT    Dizziness and giddiness   r/o cns path  vasovagal episode possibly 2nd acute drug reaction,   abdominal wall fluid collection,   h/o Anxiety  Depression  HLD (hyperlipidemia)  DM (diabetes mellitus)  HTN (hypertension)  No significant past surgical history      PLAN      ct head neg  neuro f/u  MRI brain and MRA head neg for acute infarct or aneurysm, but with partial opacification of mastoid air cells noted above  f/u acetylcholine receptor antibody to eval for myasthenia gravis  s/p spinal tap  f/u fluid study  cont ivig day 2/5  cont aspirin atorvastatin  cont meclizine  cont levaquin 500mg daily x 7 days for possible mastoiditis to be completed 1/4/19  cardio f/u noted  lisinopril d/c'd 2nd to low bp  gi cons noted  Epigastric burning; ? gastritis/ulcer  c/w maalox prn and PPI daily  avoid NSAID's  bowel regimen  eventual EGD/Colonoscopy.  surg cons noted  cont current meds

## 2018-12-30 NOTE — PROVIDER CONTACT NOTE (OTHER) - BACKGROUND
Patient was admitted for dizziness and giddiness, and has a history of hypertension, hyperlipidemia, depression, diabetes and anxiety.

## 2018-12-30 NOTE — PROGRESS NOTE ADULT - ASSESSMENT
The complaint of vertigo is difficult to explain: Trimble Hallpike and HIT are normal as is hearing. My only guess is based on his admission that blurred/ double vision is one of the description  of "dizziness".     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 NCV examination will help differentiate the two. Plan continue IVIG to complete fuill dose and discharge for NCV in the office.

## 2018-12-30 NOTE — PROGRESS NOTE ADULT - SUBJECTIVE AND OBJECTIVE BOX
INTERVAL HPI/OVERNIGHT EVENTS:    HEALTH ISSUES - PROBLEM Dx:  Abdominal pain: Abdominal pain  Vitamin D deficiency: Vitamin D deficiency  Need for prophylactic measure: Need for prophylactic measure  Depression: Depression  HTN (hypertension): HTN (hypertension)  DM (diabetes mellitus): DM (diabetes mellitus)  Abdominal wall fluid collections: Abdominal wall fluid collections  Vertigo: Vertigo          MEDICATIONS  (STANDING):  artificial  tears Solution 1 Drop(s) Both EYES three times a day  atorvastatin 40 milliGRAM(s) Oral at bedtime  cyanocobalamin 1000 MICROGram(s) Oral daily  ergocalciferol 26625 Unit(s) Oral <User Schedule>  escitalopram 20 milliGRAM(s) Oral daily  immune   globulin 10% (GAMMAGARD) IVPB 30 Gram(s) IV Intermittent every 24 hours  insulin lispro (HumaLOG) corrective regimen sliding scale   SubCutaneous Before meals and at bedtime  levoFLOXacin  Tablet 500 milliGRAM(s) Oral every 24 hours  meclizine 25 milliGRAM(s) Oral every 8 hours  pantoprazole    Tablet 40 milliGRAM(s) Oral before breakfast  risperiDONE   Tablet 0.5 milliGRAM(s) Oral daily    MEDICATIONS  (PRN):  aluminum hydroxide/magnesium hydroxide/simethicone Suspension 30 milliLiter(s) Oral every 6 hours PRN Dyspepsia      Allergies    penicillin (Hives)    Intolerances    REVIEW OF SYSTEMS:    CONSTITUTIONAL: Admits generalized weakness, fatigue; denies malaise, fevers or chills, no weight change, appetite change  EYES: Reports blurred vision and double vision with right eye pressure type pain, last night  Ears: no otalgia, no otorhea, no hearing loss, tinnitus  Nose: no epistaxis, rhinorrhea, post-discharge, sinus pressure  Throat: no throat pain, no oral lesions, tooth pain   NECK: No pain or stiffness  RESPIRATORY: No cough (productive or dry), wheezing, hemoptysis; reports slight shortness of breath, orthopnea, PND, VELASCO, snoring,  CARDIOVASCULAR: No chest pain or palpitations, no leg edema, no claudication    GASTROINTESTINAL: No abdominal or epigastric pain. No nausea, vomiting, or hematemesis; No diarrhea or constipation. No melena or hematochezia.  GENITOURINARY: No dysuria, frequency, urgency or hematuria, no pelvic pain, urinary incontinence, urgency  Musculoskeletal: no joints or muscle pain, no swelling in joints or muscles  NEUROLOGICAL: Admits to headache, dizziness, vertigo, Denies  memory loss, seizures, syncope, ataxia  SKIN: No pruritis, rashes, lesions or new moles  Psych: No anxiety, sadness, insomnia, suicide thoughts  Endocrine: No Heat or Cold intolerance, polydipsia, polyphagia  Heme/Lymph: no LN enlargement, no easy bruising or bleeding         Vital Signs Last 24 Hrs  T(C): 36.6 (30 Dec 2018 10:54), Max: 37 (29 Dec 2018 21:49)  T(F): 97.9 (30 Dec 2018 10:54), Max: 98.6 (29 Dec 2018 21:49)  HR: 93 (30 Dec 2018 10:54) (73 - 93)  BP: 141/73 (30 Dec 2018 10:54) (118/66 - 141/73)  BP(mean): 80 (29 Dec 2018 20:34) (80 - 80)  RR: 17 (30 Dec 2018 10:54) (16 - 18)  SpO2: 94% (30 Dec 2018 10:54) (93% - 98%)    PHYSICAL EXAM:  Alert, awake, oriented in time, place, and person with normal immediate and 5 minute recall, fluent speech with normal naming, repetition and comprehension, Pupils are equal and reactive to light and accomodation. Visual fields are full by confrontation testing. Funduscopy reveals normal discs and retina. Extraocular movements are abnormal: left conjugate gaze is abnormal with incomplet abduction OS - there isdepression and internal rotation as OS is abducted,  without nystagmus. Facial sensations, jaw strength, facial movements, hearing, palate, neck, shoulder and tongue are normal and symmetrical. Sensation for touch, pin, temperature and vibration, position sense are normal and symmetrical in the extremities and /or the trunk except in distal nfeet. Tone is normal in the extremities. Strength is 4/5 in the lower extremities . Muscle spindle reflexes (DTR) are normal in the upper extremities and absent at the knees and ankles. Plantar responses are flexor. Finger-to-nose and heel-to-shin are normal. Romberg sign is normal; he needs assistance to stand for stability. Unable to perform tandem gait        LABS:    CSF protein 49, cells normal;   AChR antibody pending            RADIOLOGY & ADDITIONAL TESTS:

## 2018-12-30 NOTE — PROGRESS NOTE ADULT - SUBJECTIVE AND OBJECTIVE BOX
CHIEF COMPLAINT:Patient is a 78y old  Male who presents with a chief complaint of dizziness.Pt appears comfortable.    	  REVIEW OF SYSTEMS:  CONSTITUTIONAL: No fever, weight loss, or fatigue  EYES: No eye pain, visual disturbances, or discharge  ENT:  No difficulty hearing, tinnitus, vertigo; No sinus or throat pain  NECK: No pain or stiffness  RESPIRATORY: No cough, wheezing, chills or hemoptysis; No Shortness of Breath  CARDIOVASCULAR: No chest pain, palpitations, passing out, dizziness, or leg swelling  GASTROINTESTINAL: No abdominal or epigastric pain. No nausea, vomiting, or hematemesis; No diarrhea or constipation. No melena or hematochezia.  GENITOURINARY: No dysuria, frequency, hematuria, or incontinence  NEUROLOGICAL: No headaches, memory loss, loss of strength, numbness, or tremors  SKIN: No itching, burning, rashes, or lesions   LYMPH Nodes: No enlarged glands  ENDOCRINE: No heat or cold intolerance; No hair loss  MUSCULOSKELETAL: No joint pain or swelling; No muscle, back, or extremity pain  PSYCHIATRIC: No depression, anxiety, mood swings, or difficulty sleeping  HEME/LYMPH: No easy bruising, or bleeding gums  ALLERGY AND IMMUNOLOGIC: No hives or eczema	      PHYSICAL EXAM:  T(C): 36.6 (12-30-18 @ 10:54), Max: 37 (12-29-18 @ 21:49)  HR: 93 (12-30-18 @ 10:54) (73 - 93)  BP: 141/73 (12-30-18 @ 10:54) (118/66 - 141/73)  RR: 17 (12-30-18 @ 10:54) (16 - 18)  SpO2: 94% (12-30-18 @ 10:54) (93% - 98%)    I&O's Summary    29 Dec 2018 07:01  -  30 Dec 2018 07:00  --------------------------------------------------------  IN: 0 mL / OUT: 1050 mL / NET: -1050 mL        Appearance: Normal	  HEENT:   Normal oral mucosa, PERRL, EOMI	  Lymphatic: No lymphadenopathy  Cardiovascular: Normal S1 S2, No JVD, No murmurs, No edema  Respiratory: Lungs clear to auscultation	  Psychiatry: A & O x 3, Mood & affect appropriate  Gastrointestinal:  Soft, Non-tender, + BS	  Skin: No rashes, No ecchymoses, No cyanosis	  Neurologic: Non-focal  Extremities: Normal range of motion, No clubbing, cyanosis or edema  Vascular: Peripheral pulses palpable 2+ bilaterally    MEDICATIONS  (STANDING):  artificial  tears Solution 1 Drop(s) Both EYES three times a day  atorvastatin 40 milliGRAM(s) Oral at bedtime  cyanocobalamin 1000 MICROGram(s) Oral daily  ergocalciferol 79897 Unit(s) Oral <User Schedule>  escitalopram 20 milliGRAM(s) Oral daily  immune   globulin 10% (GAMMAGARD) IVPB 30 Gram(s) IV Intermittent every 24 hours  insulin lispro (HumaLOG) corrective regimen sliding scale   SubCutaneous Before meals and at bedtime  levoFLOXacin  Tablet 500 milliGRAM(s) Oral every 24 hours  meclizine 25 milliGRAM(s) Oral every 8 hours  pantoprazole    Tablet 40 milliGRAM(s) Oral before breakfast  risperiDONE   Tablet 0.5 milliGRAM(s) Oral daily        	  LABS:	 	    proBNP: Serum Pro-Brain Natriuretic Peptide: 59 pg/mL (12-23 @ 23:00)    Lipid Profile: Cholesterol 200    HDL 43      HgA1c: Hemoglobin A1C, Whole Blood: 7.9 % (12-24 @ 10:48)    TSH: Thyroid Stimulating Hormone, Serum: 2.39 uU/mL (12-24 @ 04:53)

## 2018-12-31 LAB
CK MB BLD-MCNC: 1.3 % — SIGNIFICANT CHANGE UP (ref 0–3.5)
CK MB CFR SERPL CALC: 1.2 NG/ML — SIGNIFICANT CHANGE UP (ref 0–3.6)
CK SERPL-CCNC: 85 U/L — SIGNIFICANT CHANGE UP (ref 35–232)
CK SERPL-CCNC: 91 U/L — SIGNIFICANT CHANGE UP (ref 35–232)
GLUCOSE BLDC GLUCOMTR-MCNC: 163 MG/DL — HIGH (ref 70–99)
GLUCOSE BLDC GLUCOMTR-MCNC: 169 MG/DL — HIGH (ref 70–99)
GLUCOSE BLDC GLUCOMTR-MCNC: 176 MG/DL — HIGH (ref 70–99)
GLUCOSE BLDC GLUCOMTR-MCNC: 179 MG/DL — HIGH (ref 70–99)
TROPONIN I SERPL-MCNC: <0.015 NG/ML — SIGNIFICANT CHANGE UP (ref 0–0.04)
TROPONIN I SERPL-MCNC: <0.015 NG/ML — SIGNIFICANT CHANGE UP (ref 0–0.04)

## 2018-12-31 RX ORDER — DIPHENHYDRAMINE HCL 50 MG
50 CAPSULE ORAL ONCE
Qty: 0 | Refills: 0 | Status: COMPLETED | OUTPATIENT
Start: 2018-12-31 | End: 2018-12-31

## 2018-12-31 RX ORDER — FAMOTIDINE 10 MG/ML
20 INJECTION INTRAVENOUS ONCE
Qty: 0 | Refills: 0 | Status: COMPLETED | OUTPATIENT
Start: 2018-12-31 | End: 2018-12-31

## 2018-12-31 RX ORDER — ACETAMINOPHEN 500 MG
650 TABLET ORAL ONCE
Qty: 0 | Refills: 0 | Status: COMPLETED | OUTPATIENT
Start: 2018-12-31 | End: 2018-12-31

## 2018-12-31 RX ADMIN — Medication 1 DROP(S): at 05:48

## 2018-12-31 RX ADMIN — Medication 25 MILLIGRAM(S): at 22:16

## 2018-12-31 RX ADMIN — Medication 1 DROP(S): at 22:16

## 2018-12-31 RX ADMIN — Medication 1: at 17:01

## 2018-12-31 RX ADMIN — Medication 100 MILLIGRAM(S): at 12:02

## 2018-12-31 RX ADMIN — Medication 1: at 08:51

## 2018-12-31 RX ADMIN — PANTOPRAZOLE SODIUM 40 MILLIGRAM(S): 20 TABLET, DELAYED RELEASE ORAL at 05:47

## 2018-12-31 RX ADMIN — FAMOTIDINE 20 MILLIGRAM(S): 10 INJECTION INTRAVENOUS at 22:18

## 2018-12-31 RX ADMIN — Medication 25 MILLIGRAM(S): at 05:48

## 2018-12-31 RX ADMIN — Medication 1: at 12:02

## 2018-12-31 RX ADMIN — RISPERIDONE 0.5 MILLIGRAM(S): 4 TABLET ORAL at 12:02

## 2018-12-31 RX ADMIN — Medication 25 MILLIGRAM(S): at 13:54

## 2018-12-31 RX ADMIN — ESCITALOPRAM OXALATE 20 MILLIGRAM(S): 10 TABLET, FILM COATED ORAL at 12:02

## 2018-12-31 RX ADMIN — HEPARIN SODIUM 5000 UNIT(S): 5000 INJECTION INTRAVENOUS; SUBCUTANEOUS at 05:48

## 2018-12-31 RX ADMIN — HEPARIN SODIUM 5000 UNIT(S): 5000 INJECTION INTRAVENOUS; SUBCUTANEOUS at 17:01

## 2018-12-31 RX ADMIN — Medication 1: at 22:17

## 2018-12-31 RX ADMIN — Medication 1 DROP(S): at 13:54

## 2018-12-31 RX ADMIN — PREGABALIN 1000 MICROGRAM(S): 225 CAPSULE ORAL at 12:02

## 2018-12-31 RX ADMIN — IMMUNE GLOBULIN (HUMAN) 75 GRAM(S): 10 INJECTION INTRAVENOUS; SUBCUTANEOUS at 22:19

## 2018-12-31 RX ADMIN — ATORVASTATIN CALCIUM 40 MILLIGRAM(S): 80 TABLET, FILM COATED ORAL at 22:16

## 2018-12-31 NOTE — PROGRESS NOTE ADULT - PROBLEM SELECTOR PLAN 2
Patient w/ Hx of hernia repair 10yrs ago, denies abdominal pain. CT A/P: 4.7 x 0.7 x 4.0 cm thick-walled fluid collection within the anterior. Abdominal and an umbilical hernia repair site. No significant surrounding inflammation.  -Surgery consulted: recc med management, will follow up  -Patient c/o burning abdominal pain  -GI Dr Guerrero,   -c/w maalox and ppi, eventual EGD/colonoscopy

## 2018-12-31 NOTE — PROGRESS NOTE ADULT - PROBLEM SELECTOR PLAN 1
c/w maalox prn and PPI daily  avoid NSAID's  bowel regimen  plan for EGD/Colonoscopy once neurologically stable

## 2018-12-31 NOTE — PROGRESS NOTE ADULT - ASSESSMENT
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  1.Tele monitoring.  2.Neurology f/u.  3.S/P LP and started on IV IG.  4.CP-CE q8x3.  5.DM-insulin.  6.Cont asa,statin.  7.GI and DVT prophylaxis.

## 2018-12-31 NOTE — PROGRESS NOTE ADULT - SUBJECTIVE AND OBJECTIVE BOX
Summary:   8y Male  presents with a chief complaint of dizziness, now GI to evaluate for c/o abdominal pain. The patient has a significant past medical history of Anxiety, Depression  HLD (hyperlipidemia), DM (diabetes mellitus), and HTN (hypertension)             .     Subjective: Patient continues with c/o dizziness. MRI brain and MRA head neg for acute infarct or aneurysm, but with partial opacification of mastoid air cells noted.  Reports chest pain and SOB overnight but now resolved.        Objective:    MEDICATIONS  (STANDING):  artificial  tears Solution 1 Drop(s) Both EYES three times a day  atorvastatin 40 milliGRAM(s) Oral at bedtime  cyanocobalamin 1000 MICROGram(s) Oral daily  docusate sodium 100 milliGRAM(s) Oral daily  ergocalciferol 89004 Unit(s) Oral <User Schedule>  escitalopram 20 milliGRAM(s) Oral daily  heparin  Injectable 5000 Unit(s) SubCutaneous every 12 hours  immune   globulin 10% (GAMMAGARD) IVPB 30 Gram(s) IV Intermittent every 24 hours  insulin lispro (HumaLOG) corrective regimen sliding scale   SubCutaneous Before meals and at bedtime  levoFLOXacin  Tablet 500 milliGRAM(s) Oral every 24 hours  meclizine 25 milliGRAM(s) Oral every 8 hours  pantoprazole    Tablet 40 milliGRAM(s) Oral before breakfast  risperiDONE   Tablet 0.5 milliGRAM(s) Oral daily  senna 2 Tablet(s) Oral at bedtime    MEDICATIONS  (PRN):  aluminum hydroxide/magnesium hydroxide/simethicone Suspension 30 milliLiter(s) Oral every 6 hours PRN Dyspepsia  LORazepam     Tablet 1 milliGRAM(s) Oral daily PRN Anxiety              Vital Signs Last 24 Hrs  T(C): 36.6 (31 Dec 2018 11:53), Max: 36.7 (31 Dec 2018 05:58)  T(F): 97.8 (31 Dec 2018 11:53), Max: 98 (31 Dec 2018 05:58)  HR: 91 (31 Dec 2018 11:53) (84 - 93)  BP: 138/78 (31 Dec 2018 11:53) (114/73 - 138/78)  BP(mean): --  RR: 18 (31 Dec 2018 11:53) (17 - 18)  SpO2: 93% (31 Dec 2018 11:53) (93% - 95%)      General:  Well developed, well nourished, alert and active, no pallor, NAD.  HEENT:    Normal appearance of conjunctiva, ears, nose, lips, oropharynx, and oral mucosa, anicteric.  Neck:  No masses, no asymmetry.  Lymph Nodes:  No lymphadenopathy.   Cardiovascular:  RRR normal S1/S2, no murmur.  Respiratory:  CTA B/L, normal respiratory effort.   Abdominal:   soft, no masses or tenderness, normoactive BS, NT/ND, no HSM.  Extremities:   No clubbing or cyanosis, normal capillary refill, no edema.   Skin:   No rash, jaundice, lesions, eczema.   Musculoskeletal:  No joint swelling, erythema or tenderness.   Neuro: No focal deficits.   Other:       LABS:                RADIOLOGY & ADDITIONAL TESTS:

## 2018-12-31 NOTE — PROGRESS NOTE ADULT - SUBJECTIVE AND OBJECTIVE BOX
CHIEF COMPLAINT:Patient is a 78y old  Male who presents with a chief complaint of dizziness.Pt appears comfortable.    	  REVIEW OF SYSTEMS:  CONSTITUTIONAL: No fever, weight loss, or fatigue  EYES: No eye pain, visual disturbances, or discharge  ENT:  No difficulty hearing, tinnitus, vertigo; No sinus or throat pain  NECK: No pain or stiffness  RESPIRATORY: No cough, wheezing, chills or hemoptysis; No Shortness of Breath  CARDIOVASCULAR: No chest pain, palpitations, passing out, dizziness, or leg swelling  GASTROINTESTINAL: No abdominal or epigastric pain. No nausea, vomiting, or hematemesis; No diarrhea or constipation. No melena or hematochezia.  GENITOURINARY: No dysuria, frequency, hematuria, or incontinence  NEUROLOGICAL: No headaches, memory loss, loss of strength, numbness, or tremors  SKIN: No itching, burning, rashes, or lesions   LYMPH Nodes: No enlarged glands  ENDOCRINE: No heat or cold intolerance; No hair loss  MUSCULOSKELETAL: No joint pain or swelling; No muscle, back, or extremity pain  PSYCHIATRIC: No depression, anxiety, mood swings, or difficulty sleeping  HEME/LYMPH: No easy bruising, or bleeding gums  ALLERGY AND IMMUNOLOGIC: No hives or eczema	      PHYSICAL EXAM:  T(C): 36.7 (12-31-18 @ 05:58), Max: 36.7 (12-31-18 @ 05:58)  HR: 92 (12-31-18 @ 02:42) (84 - 93)  BP: 114/73 (12-31-18 @ 02:42) (114/73 - 141/73)  RR: 18 (12-31-18 @ 02:42) (17 - 18)  SpO2: 95% (12-31-18 @ 02:42) (94% - 95%)    I&O's Summary    31 Dec 2018 07:01  -  31 Dec 2018 09:19  --------------------------------------------------------  IN: 0 mL / OUT: 600 mL / NET: -600 mL        Appearance: Normal	  HEENT:   Normal oral mucosa, PERRL, EOMI	  Lymphatic: No lymphadenopathy  Cardiovascular: Normal S1 S2, No JVD, No murmurs, No edema  Respiratory: Lungs clear to auscultation	  Psychiatry: A & O x 3, Mood & affect appropriate  Gastrointestinal:  Soft, Non-tender, + BS	  Skin: No rashes, No ecchymoses, No cyanosis	  Neurologic: Non-focal  Extremities: Normal range of motion, No clubbing, cyanosis or edema  Vascular: Peripheral pulses palpable 2+ bilaterally    MEDICATIONS  (STANDING):  artificial  tears Solution 1 Drop(s) Both EYES three times a day  atorvastatin 40 milliGRAM(s) Oral at bedtime  cyanocobalamin 1000 MICROGram(s) Oral daily  docusate sodium 100 milliGRAM(s) Oral daily  ergocalciferol 65428 Unit(s) Oral <User Schedule>  escitalopram 20 milliGRAM(s) Oral daily  heparin  Injectable 5000 Unit(s) SubCutaneous every 12 hours  immune   globulin 10% (GAMMAGARD) IVPB 30 Gram(s) IV Intermittent every 24 hours  insulin lispro (HumaLOG) corrective regimen sliding scale   SubCutaneous Before meals and at bedtime  levoFLOXacin  Tablet 500 milliGRAM(s) Oral every 24 hours  meclizine 25 milliGRAM(s) Oral every 8 hours  pantoprazole    Tablet 40 milliGRAM(s) Oral before breakfast  risperiDONE   Tablet 0.5 milliGRAM(s) Oral daily  senna 2 Tablet(s) Oral at bedtime        	  LABS:	 	    CARDIAC MARKERS:  CARDIAC MARKERS ( 31 Dec 2018 00:16 )  <0.015 ng/mL / x     / 91 U/L / x     / 1.2 ng/mL      proBNP: Serum Pro-Brain Natriuretic Peptide: 59 pg/mL (12-23 @ 23:00)    Lipid Profile: Cholesterol 200    HDL 43      HgA1c: Hemoglobin A1C, Whole Blood: 7.9 % (12-24 @ 10:48)    TSH: Thyroid Stimulating Hormone, Serum: 2.39 uU/mL (12-24 @ 04:53)

## 2018-12-31 NOTE — CHART NOTE - NSCHARTNOTEFT_GEN_A_CORE
was paged that pt is c/o chest pain and SOB. Pt states he is not felling well with IgG infusion. Did ECG which showed some non specific ST changes although not significant. Did CE which were negative. Symptoms may be due to adverse reaction of Infusion. Infusion stopped for sometime as pt does not want it .

## 2018-12-31 NOTE — PROGRESS NOTE ADULT - ASSESSMENT
Patient seen and examined lying in bed in NAD. He continues to have epigastric burning and states dizziness has not subsided. He denies chest pain, SOB, N/V, or palpitations. No diarrhea, has hx of constipation. Tolerating DASH diet. Currently receiving IVIG for GBS.

## 2018-12-31 NOTE — PROGRESS NOTE ADULT - SUBJECTIVE AND OBJECTIVE BOX
PGY 1 Note discussed with supervising resident and primary attending    Patient is a 78y old  Male who presents with a chief complaint of dizziness (31 Dec 2018 10:11)      INTERVAL HPI/OVERNIGHT EVENTS: No acute events overnight, remains afebrile; HD stable  Pt states improving vertigo. Reports that pt developed CP, SOB, itchiness after IVIG infusion started yesterday, now resolved.  Contacted Neuro regarding continuation of IVIG.    MEDICATIONS  (STANDING):  artificial  tears Solution 1 Drop(s) Both EYES three times a day  atorvastatin 40 milliGRAM(s) Oral at bedtime  cyanocobalamin 1000 MICROGram(s) Oral daily  docusate sodium 100 milliGRAM(s) Oral daily  ergocalciferol 62133 Unit(s) Oral <User Schedule>  escitalopram 20 milliGRAM(s) Oral daily  heparin  Injectable 5000 Unit(s) SubCutaneous every 12 hours  immune   globulin 10% (GAMMAGARD) IVPB 30 Gram(s) IV Intermittent every 24 hours  insulin lispro (HumaLOG) corrective regimen sliding scale   SubCutaneous Before meals and at bedtime  levoFLOXacin  Tablet 500 milliGRAM(s) Oral every 24 hours  meclizine 25 milliGRAM(s) Oral every 8 hours  pantoprazole    Tablet 40 milliGRAM(s) Oral before breakfast  risperiDONE   Tablet 0.5 milliGRAM(s) Oral daily  senna 2 Tablet(s) Oral at bedtime    MEDICATIONS  (PRN):  aluminum hydroxide/magnesium hydroxide/simethicone Suspension 30 milliLiter(s) Oral every 6 hours PRN Dyspepsia  LORazepam     Tablet 1 milliGRAM(s) Oral daily PRN Anxiety      __________________________________________________  REVIEW OF SYSTEMS:    CONSTITUTIONAL: No fever,   EYES: no acute visual disturbances  NECK: No pain or stiffness  RESPIRATORY: No cough; No shortness of breath  CARDIOVASCULAR: No chest pain, no palpitations  GASTROINTESTINAL: No pain. No nausea or vomiting; No diarrhea   NEUROLOGICAL: No headache or numbness, no tremors  MUSCULOSKELETAL: No joint pain, no muscle pain  GENITOURINARY: no dysuria, no frequency, no hesitancy      Vital Signs Last 24 Hrs  T(C): 36.6 (31 Dec 2018 11:53), Max: 36.7 (31 Dec 2018 05:58)  T(F): 97.8 (31 Dec 2018 11:53), Max: 98 (31 Dec 2018 05:58)  HR: 91 (31 Dec 2018 11:53) (84 - 93)  BP: 138/78 (31 Dec 2018 11:53) (114/73 - 138/78)  BP(mean): --  RR: 18 (31 Dec 2018 11:53) (17 - 18)  SpO2: 93% (31 Dec 2018 11:53) (93% - 95%)    ________________________________________________  PHYSICAL EXAM:    GENERAL: NAD  HEENT: Normocephalic;  conjunctivae and sclerae clear; moist mucous membranes;   NECK : supple  CHEST/LUNG: Clear to auscultation bilaterally with good air entry   HEART: S1 S2  regular; no murmurs, gallops or rubs  ABDOMEN: Soft, Nontender, Nondistended; Bowel sounds present  EXTREMITIES: no cyanosis; no edema; no calf tenderness  SKIN: warm and dry; no rash  NERVOUS SYSTEM:  Awake and alert; no new deficits    _________________________________________________  LABS:              CAPILLARY BLOOD GLUCOSE      POCT Blood Glucose.: 179 mg/dL (31 Dec 2018 11:49)  POCT Blood Glucose.: 163 mg/dL (31 Dec 2018 07:51)  POCT Blood Glucose.: 144 mg/dL (30 Dec 2018 22:15)  POCT Blood Glucose.: 152 mg/dL (30 Dec 2018 16:45)        RADIOLOGY & ADDITIONAL TESTS:    Imaging Personally Reviewed:  YES    Consultant(s) Notes Reviewed:   YES    Care Discussed with Consultants :     Plan of care was discussed with patient and /or primary care giver; all questions and concerns were addressed and care was aligned with patient's wishes.

## 2018-12-31 NOTE — PROGRESS NOTE ADULT - SUBJECTIVE AND OBJECTIVE BOX
Patient is a 78y old  Male who presents with a chief complaint of dizziness (31 Dec 2018 09:19)    pt seen in icu [  ], reg med floor [ x  ], bed [ x ], chair at bedside [   ], a+o x3 [ x ], lethargic [  ],  nad [ x ]    pt with c/o chest pain and SOB overnight. Pt stated he is not felling well with IgG infusion       Allergies    penicillin (Hives)        Vitals    T(F): 98 (12-31-18 @ 05:58), Max: 98 (12-31-18 @ 05:58)  HR: 92 (12-31-18 @ 02:42) (84 - 93)  BP: 114/73 (12-31-18 @ 02:42) (114/73 - 141/73)  RR: 18 (12-31-18 @ 02:42) (17 - 18)  SpO2: 95% (12-31-18 @ 02:42) (94% - 95%)  Wt(kg): --  CAPILLARY BLOOD GLUCOSE      POCT Blood Glucose.: 163 mg/dL (31 Dec 2018 07:51)      Labs                CARDIAC MARKERS ( 31 Dec 2018 00:16 )  <0.015 ng/mL / x     / 91 U/L / x     / 1.2 ng/mL        .CSF CSF  12-29 @ 00:45   Testing in progress  --  --      .Urine Clean Catch (Midstream)  12-24 @ 10:58   <10,000 CFU/ml Normal Urogenital juliane present  --  --          Radiology Results      Meds    MEDICATIONS  (STANDING):  artificial  tears Solution 1 Drop(s) Both EYES three times a day  atorvastatin 40 milliGRAM(s) Oral at bedtime  cyanocobalamin 1000 MICROGram(s) Oral daily  docusate sodium 100 milliGRAM(s) Oral daily  ergocalciferol 10838 Unit(s) Oral <User Schedule>  escitalopram 20 milliGRAM(s) Oral daily  heparin  Injectable 5000 Unit(s) SubCutaneous every 12 hours  immune   globulin 10% (GAMMAGARD) IVPB 30 Gram(s) IV Intermittent every 24 hours  insulin lispro (HumaLOG) corrective regimen sliding scale   SubCutaneous Before meals and at bedtime  levoFLOXacin  Tablet 500 milliGRAM(s) Oral every 24 hours  meclizine 25 milliGRAM(s) Oral every 8 hours  pantoprazole    Tablet 40 milliGRAM(s) Oral before breakfast  risperiDONE   Tablet 0.5 milliGRAM(s) Oral daily  senna 2 Tablet(s) Oral at bedtime      MEDICATIONS  (PRN):  aluminum hydroxide/magnesium hydroxide/simethicone Suspension 30 milliLiter(s) Oral every 6 hours PRN Dyspepsia      Physical Exam      Neuro :  no focal deficits  Respiratory: CTA B/L  CV: RRR, S1S2, no murmurs,   Abdominal: Soft, NT, ND +BS,  Extremities: No edema, + peripheral pulses    ASSESSMENT    Dizziness and giddiness   r/o cns path  vasovagal episode possibly 2nd acute drug reaction,   abdominal wall fluid collection,   h/o Anxiety  Depression  HLD (hyperlipidemia)  DM (diabetes mellitus)  HTN (hypertension)  No significant past surgical history      PLAN      ct head neg    MRI brain and MRA head neg for acute infarct or aneurysm, but with partial opacification of mastoid air cells noted above  f/u acetylcholine receptor antibody to eval for myasthenia gravis  s/p spinal tap  f/u fluid study  cont ivig day 3/5 held  neuro f/u to determine to cont or d/c ivig  cont aspirin atorvastatin  cont meclizine  cont levaquin 500mg daily x 7 days for possible mastoiditis to be completed 1/4/19  cardio f/u noted  f/u ce q8 x3 for chest pain  ce x1 neg noted above  gi cons noted  Epigastric burning; ? gastritis/ulcer  c/w maalox prn and PPI daily  avoid NSAID's  bowel regimen  eventual EGD/Colonoscopy.  surg cons noted  cont current meds

## 2018-12-31 NOTE — DIETITIAN INITIAL EVALUATION ADULT. - OTHER INFO
Patient seen for LOS. Patient from home lives with wife. Visited pt. alert, reports usually eats well at home, wife prepared most of the meals, denies vomiting but complaints of abdominal pain with nausea 1-2 days PTA, stated -250 Lbs & fluctuates. While in the hospital pt. consuming 40-50% of meals, observed breakfast tray intake 50% & tolerating, provided food preferences Per pt. diabetic >3yrs & take metformin at home, do not follow any type of diet or monitor blood glucose levels at home, presently declined nutrition education, followed by GI/team & pending EGD/colonoscopy, skin intact. Discussed with RN.

## 2018-12-31 NOTE — PROGRESS NOTE ADULT - PROBLEM SELECTOR PLAN 1
Patient p/w dizziness, diaphoresis, nausea and face flushing after taking Cefuroxime. Symptoms most likely due to allergic reaction.   ECG: NSR VR@92bpm T1: negative  CT brain: no acute pathology  MRI/MRA: chronic microvascular ischemic changes; Attenuation distal left posterior cerebral artery. Possible mastoiditis.  Echo: grossly normal LV function, mild aortic stenosis, grade 1 DD  Neuro Dr Forrest: recc paraneoplastic antibody profile; spinal tap  Cardio Dr Mcdowell: tele, orthostats q shift, lisinopril discontinued  -MRI brain and MRA head neg for acute infarct or aneurysm, but with partial opacification of mastoid air cells noted above  -C/w levaquin 500mg daily x 7 days for possible mastoiditis to be completed 1/4/19  -F/u CSF studies  -Given generalized weakness and extraocular muscle weakness with double vision, improvement noted in strength (25%), myasthenia gravis vs May Lux variant of GBS with EOM disorder and ataxia. Outpt NCV after discharge to differentiate between the two  -Pt reported CP, SOB, itchiness during IVIG 2/5 treatment on 12/30  -Pending neuro recs for continuation of IVIG  -F/u acetylcholine receptor antibody to eval for myasthenia gravis

## 2018-12-31 NOTE — DIETITIAN INITIAL EVALUATION ADULT. - PERTINENT LABORATORY DATA
12-28 Phos 2.5 mg/dL 12-24 [id number] 7.9 %<H> 12-24 Chol 200 mg/dL<H>  mg/dL HDL 43 mg/dL Trig 154 mg/dL<H>

## 2019-01-01 DIAGNOSIS — R07.9 CHEST PAIN, UNSPECIFIED: ICD-10-CM

## 2019-01-01 LAB
ANION GAP SERPL CALC-SCNC: 10 MMOL/L — SIGNIFICANT CHANGE UP (ref 5–17)
BUN SERPL-MCNC: 13 MG/DL — SIGNIFICANT CHANGE UP (ref 7–18)
CALCIUM SERPL-MCNC: 8.1 MG/DL — LOW (ref 8.4–10.5)
CHLORIDE SERPL-SCNC: 99 MMOL/L — SIGNIFICANT CHANGE UP (ref 96–108)
CO2 SERPL-SCNC: 26 MMOL/L — SIGNIFICANT CHANGE UP (ref 22–31)
CREAT SERPL-MCNC: 0.9 MG/DL — SIGNIFICANT CHANGE UP (ref 0.5–1.3)
GLUCOSE BLDC GLUCOMTR-MCNC: 140 MG/DL — HIGH (ref 70–99)
GLUCOSE BLDC GLUCOMTR-MCNC: 154 MG/DL — HIGH (ref 70–99)
GLUCOSE BLDC GLUCOMTR-MCNC: 171 MG/DL — HIGH (ref 70–99)
GLUCOSE BLDC GLUCOMTR-MCNC: 177 MG/DL — HIGH (ref 70–99)
GLUCOSE SERPL-MCNC: 133 MG/DL — HIGH (ref 70–99)
HCT VFR BLD CALC: 41.2 % — SIGNIFICANT CHANGE UP (ref 39–50)
HGB BLD-MCNC: 13.5 G/DL — SIGNIFICANT CHANGE UP (ref 13–17)
MCHC RBC-ENTMCNC: 31.4 PG — SIGNIFICANT CHANGE UP (ref 27–34)
MCHC RBC-ENTMCNC: 32.9 GM/DL — SIGNIFICANT CHANGE UP (ref 32–36)
MCV RBC AUTO: 95.6 FL — SIGNIFICANT CHANGE UP (ref 80–100)
PLATELET # BLD AUTO: 214 K/UL — SIGNIFICANT CHANGE UP (ref 150–400)
POTASSIUM SERPL-MCNC: 3.6 MMOL/L — SIGNIFICANT CHANGE UP (ref 3.5–5.3)
POTASSIUM SERPL-SCNC: 3.6 MMOL/L — SIGNIFICANT CHANGE UP (ref 3.5–5.3)
RBC # BLD: 4.31 M/UL — SIGNIFICANT CHANGE UP (ref 4.2–5.8)
RBC # FLD: 12 % — SIGNIFICANT CHANGE UP (ref 10.3–14.5)
SODIUM SERPL-SCNC: 135 MMOL/L — SIGNIFICANT CHANGE UP (ref 135–145)
WBC # BLD: 6.1 K/UL — SIGNIFICANT CHANGE UP (ref 3.8–10.5)
WBC # FLD AUTO: 6.1 K/UL — SIGNIFICANT CHANGE UP (ref 3.8–10.5)

## 2019-01-01 RX ORDER — PANTOPRAZOLE SODIUM 20 MG/1
40 TABLET, DELAYED RELEASE ORAL
Qty: 0 | Refills: 0 | Status: DISCONTINUED | OUTPATIENT
Start: 2019-01-01 | End: 2019-01-02

## 2019-01-01 RX ORDER — ACETAMINOPHEN 500 MG
650 TABLET ORAL EVERY 6 HOURS
Qty: 0 | Refills: 0 | Status: DISCONTINUED | OUTPATIENT
Start: 2019-01-01 | End: 2019-01-08

## 2019-01-01 RX ORDER — IMMUNE GLOBULIN (HUMAN) 10 G/100ML
30 INJECTION INTRAVENOUS; SUBCUTANEOUS EVERY 24 HOURS
Qty: 0 | Refills: 0 | Status: DISCONTINUED | OUTPATIENT
Start: 2019-01-01 | End: 2019-01-01

## 2019-01-01 RX ORDER — DIPHENHYDRAMINE HCL 50 MG
50 CAPSULE ORAL EVERY 24 HOURS
Qty: 0 | Refills: 0 | Status: DISCONTINUED | OUTPATIENT
Start: 2019-01-01 | End: 2019-01-01

## 2019-01-01 RX ORDER — ACETAMINOPHEN 500 MG
650 TABLET ORAL EVERY 24 HOURS
Qty: 0 | Refills: 0 | Status: DISCONTINUED | OUTPATIENT
Start: 2019-01-01 | End: 2019-01-01

## 2019-01-01 RX ADMIN — ATORVASTATIN CALCIUM 40 MILLIGRAM(S): 80 TABLET, FILM COATED ORAL at 21:35

## 2019-01-01 RX ADMIN — HEPARIN SODIUM 5000 UNIT(S): 5000 INJECTION INTRAVENOUS; SUBCUTANEOUS at 05:53

## 2019-01-01 RX ADMIN — ESCITALOPRAM OXALATE 20 MILLIGRAM(S): 10 TABLET, FILM COATED ORAL at 12:00

## 2019-01-01 RX ADMIN — Medication 100 MILLIGRAM(S): at 11:59

## 2019-01-01 RX ADMIN — RISPERIDONE 0.5 MILLIGRAM(S): 4 TABLET ORAL at 12:01

## 2019-01-01 RX ADMIN — SENNA PLUS 2 TABLET(S): 8.6 TABLET ORAL at 21:35

## 2019-01-01 RX ADMIN — Medication 1 DROP(S): at 21:35

## 2019-01-01 RX ADMIN — Medication 1: at 11:59

## 2019-01-01 RX ADMIN — Medication 25 MILLIGRAM(S): at 05:54

## 2019-01-01 RX ADMIN — Medication 25 MILLIGRAM(S): at 13:19

## 2019-01-01 RX ADMIN — PREGABALIN 1000 MICROGRAM(S): 225 CAPSULE ORAL at 12:01

## 2019-01-01 RX ADMIN — PANTOPRAZOLE SODIUM 40 MILLIGRAM(S): 20 TABLET, DELAYED RELEASE ORAL at 05:54

## 2019-01-01 RX ADMIN — HEPARIN SODIUM 5000 UNIT(S): 5000 INJECTION INTRAVENOUS; SUBCUTANEOUS at 17:05

## 2019-01-01 RX ADMIN — Medication 25 MILLIGRAM(S): at 21:35

## 2019-01-01 RX ADMIN — Medication 1: at 21:35

## 2019-01-01 RX ADMIN — Medication 1: at 17:04

## 2019-01-01 RX ADMIN — Medication 1 DROP(S): at 05:53

## 2019-01-01 RX ADMIN — Medication 1 DROP(S): at 13:20

## 2019-01-01 NOTE — PROGRESS NOTE ADULT - PROBLEM SELECTOR PLAN 2
Patient p/w dizziness, diaphoresis, nausea and face flushing after taking Cefuroxime. Symptoms most likely due to allergic reaction.   ECG: NSR VR@92bpm T1: negative  CT brain: no acute pathology  MRI/MRA: chronic microvascular ischemic changes; Attenuation distal left posterior cerebral artery. Possible mastoiditis.  Echo: grossly normal LV function, mild aortic stenosis, grade 1 DD  Neuro Dr Forrest: recc paraneoplastic antibody profile; spinal tap  Cardio Dr Mcdowell: tele, orthostats q shift, lisinopril discontinued  -MRI brain and MRA head neg for acute infarct or aneurysm, but with partial opacification of mastoid air cells noted above  -C/w levaquin 500mg daily x 7 days for possible mastoiditis to be completed 1/4/19  -F/u CSF studies  -Given generalized weakness and extraocular muscle weakness with double vision, improvement noted in strength (25%), myasthenia gravis vs May Lux variant of GBS with EOM disorder and ataxia. Outpt NCV after discharge to differentiate between the two  -Pt reported CP, SOB, itchiness during IVIG 2/5 treatment on 12/30  -F/u acetylcholine receptor antibody to eval for myasthenia gravis  -Pt tolerated IVIG injection yesterday with premedications suggested by Neuro.  -The IVIG administration stopped secondary to side effects not counted as a full administration per Neurology  -Patient has completed 2/5 administrations so far  -Depending on patient's condition tomorrow 1/2, will start IVIG. However patient should be premedicated with 50 mg PO benadryl, 1000 mg PO tylenol, and protonix prior to the IVIG infusion. Also patient should be given a light dinner consisting of mostly fruits and vegetables. Patient p/w dizziness, diaphoresis, nausea and face flushing after taking Cefuroxime. Symptoms most likely due to allergic reaction. ECG: NSR VR@92bpm T1: negative. CT brain: no acute pathology. MRI/MRA: neg for acute infarct or aneurysm but chronic microvascular ischemic changes; Attenuation distal left posterior cerebral artery. Possible mastoiditis. Echo: grossly normal LV function, mild aortic stenosis, grade 1 DD.  -Neuro Dr Lon mcdonald paraneoplastic antibody profile; spinal tap.   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 Outpt NCV after discharge to differentiate between the two  -F/u acetylcholine receptor antibody to eval for myasthenia gravis  -F/u CSF studies  -Pt reported CP, SOB, itchiness during IVIG 2/5 treatment on 12/30  -Pt tolerated IVIG injection yesterday with premedications suggested by Neuro.  -The IVIG administration stopped secondary to side effects not counted as a full administration per Neurology  -Patient has completed 2/5 administrations so far  -Depending on patient's condition tomorrow 1/2, will start IVIG. However patient should be premedicated with 50 mg PO benadryl, 1000 mg PO Tylenol, and Protonix prior to the IVIG infusion. Also patient should be given a light dinner consisting of mostly fruits and vegetables.

## 2019-01-01 NOTE — CHART NOTE - NSCHARTNOTEFT_GEN_A_CORE
Discussed with Dr Forrest and Dr Mcdowell. Patient is persistently c/o chest pain, although the cardiac enzymes and EKG is normal. Plan is to do stress test in AM 1/2/19. As per Dr. Forrest, patient has risk of MI 2/2 to IVIG infusion, therefore patient needs to be closely monitored. Also, we will hold off to IVIG tonight 1/1/19 due to stress test tomorrow.    Depending on patient's condition tomorrow, will start IVIG. However patient should be premedicated with 50mg PO benadryl, 1000mg PO tylenol, and protonix prior to the IVIG infusion. Also patient should be given a light dinner consisting of mostly fruits and vegetables.

## 2019-01-01 NOTE — PROGRESS NOTE ADULT - SUBJECTIVE AND OBJECTIVE BOX
PGY 1 Note discussed with supervising resident and primary attending    Patient is a 78y old  Male who presents with a chief complaint of dizziness (01 Jan 2019 09:56)      INTERVAL HPI/OVERNIGHT EVENTS: No acute events overnight, remains afebrile; HD stable, H/H stable, WBC WNL  Pt tolerated IVIG injection yesterday with premedications suggested by Neuro.  Pt reports b/l eye pains, blurry visions, and pain in the back of neck around the time IVIG administration administered.  These are possible side effects of IVIG administration per Neurology. Will continue to monitor.  No new complaints reported.    MEDICATIONS  (STANDING):  artificial  tears Solution 1 Drop(s) Both EYES three times a day  atorvastatin 40 milliGRAM(s) Oral at bedtime  cyanocobalamin 1000 MICROGram(s) Oral daily  docusate sodium 100 milliGRAM(s) Oral daily  ergocalciferol 44782 Unit(s) Oral <User Schedule>  escitalopram 20 milliGRAM(s) Oral daily  heparin  Injectable 5000 Unit(s) SubCutaneous every 12 hours  insulin lispro (HumaLOG) corrective regimen sliding scale   SubCutaneous Before meals and at bedtime  levoFLOXacin  Tablet 500 milliGRAM(s) Oral every 24 hours  meclizine 25 milliGRAM(s) Oral every 8 hours  pantoprazole    Tablet 40 milliGRAM(s) Oral before breakfast  pantoprazole    Tablet 40 milliGRAM(s) Oral before breakfast  risperiDONE   Tablet 0.5 milliGRAM(s) Oral daily  senna 2 Tablet(s) Oral at bedtime    MEDICATIONS  (PRN):  acetaminophen   Tablet .. 650 milliGRAM(s) Oral every 6 hours PRN Temp greater or equal to 38C (100.4F), Mild Pain (1 - 3), Moderate Pain (4 - 6)  aluminum hydroxide/magnesium hydroxide/simethicone Suspension 30 milliLiter(s) Oral every 6 hours PRN Dyspepsia  LORazepam     Tablet 1 milliGRAM(s) Oral daily PRN Anxiety      __________________________________________________  REVIEW OF SYSTEMS:    CONSTITUTIONAL: No fever,   EYES: Blurry vision and eye pain as above  NECK: No stiffness. Neck pain as above  RESPIRATORY: No cough; No shortness of breath  CARDIOVASCULAR: No chest pain, no palpitations  GASTROINTESTINAL: No pain. No nausea or vomiting; No diarrhea   NEUROLOGICAL: No headache or numbness, no tremors  MUSCULOSKELETAL: No joint pain, no muscle pain  GENITOURINARY: no dysuria, no frequency, no hesitancy      Vital Signs Last 24 Hrs  T(C): 36.4 (01 Jan 2019 02:11), Max: 36.7 (31 Dec 2018 14:14)  T(F): 97.5 (01 Jan 2019 02:11), Max: 98 (31 Dec 2018 14:14)  HR: 75 (01 Jan 2019 05:50) (75 - 91)  BP: 136/75 (01 Jan 2019 05:50) (111/52 - 138/78)  BP(mean): --  RR: 18 (01 Jan 2019 05:50) (17 - 18)  SpO2: 96% (01 Jan 2019 05:50) (91% - 96%)    ________________________________________________  PHYSICAL EXAM:    GENERAL: NAD  HEENT: Normocephalic;  conjunctivae and sclerae clear; moist mucous membranes;   NECK : supple  CHEST/LUNG: Clear to auscultation bilaterally with good air entry   HEART: S1 S2  regular; no murmurs, gallops or rubs  ABDOMEN: Soft, Nontender, Nondistended; Bowel sounds present  EXTREMITIES: no cyanosis; no edema; no calf tenderness  SKIN: warm and dry; no rash  NERVOUS SYSTEM:  Awake and alert; no new deficits    _________________________________________________  LABS:                        13.5   6.1   )-----------( 214      ( 01 Jan 2019 06:47 )             41.2     01-01    135  |  99  |  13  ----------------------------<  133<H>  3.6   |  26  |  0.90    Ca    8.1<L>      01 Jan 2019 06:47          CAPILLARY BLOOD GLUCOSE      POCT Blood Glucose.: 140 mg/dL (01 Jan 2019 07:54)  POCT Blood Glucose.: 176 mg/dL (31 Dec 2018 21:57)  POCT Blood Glucose.: 169 mg/dL (31 Dec 2018 16:19)  POCT Blood Glucose.: 179 mg/dL (31 Dec 2018 11:49)        RADIOLOGY & ADDITIONAL TESTS:    Imaging Personally Reviewed:  YES    Consultant(s) Notes Reviewed:   YES    Care Discussed with Consultants :     Plan of care was discussed with patient and /or primary care giver; all questions and concerns were addressed and care was aligned with patient's wishes.

## 2019-01-01 NOTE — PROGRESS NOTE ADULT - SUBJECTIVE AND OBJECTIVE BOX
Patient is a 78y old  Male who presents with a chief complaint of dizziness (31 Dec 2018 12:39)    pt seen in icu [  ], reg med floor [ x  ], bed [ x ], chair at bedside [   ], a+o x3 [ x ], lethargic [  ],  nad [ x ]        Allergies    penicillin (Hives)        Vitals    T(F): 97.5 (01-01-19 @ 02:11), Max: 98 (12-31-18 @ 14:14)  HR: 75 (01-01-19 @ 05:50) (75 - 91)  BP: 136/75 (01-01-19 @ 05:50) (111/52 - 138/78)  RR: 18 (01-01-19 @ 05:50) (17 - 18)  SpO2: 96% (01-01-19 @ 05:50) (91% - 96%)  Wt(kg): --  CAPILLARY BLOOD GLUCOSE      POCT Blood Glucose.: 176 mg/dL (31 Dec 2018 21:57)      Labs                          13.5   6.1   )-----------( 214      ( 01 Jan 2019 06:47 )             41.2       01-01    135  |  99  |  13  ----------------------------<  133<H>  3.6   |  26  |  0.90    Ca    8.1<L>      01 Jan 2019 06:47        CARDIAC MARKERS ( 31 Dec 2018 12:44 )  <0.015 ng/mL / x     / 85 U/L / x     / x      CARDIAC MARKERS ( 31 Dec 2018 00:16 )  <0.015 ng/mL / x     / 91 U/L / x     / 1.2 ng/mL          .CSF CSF  12-29 @ 00:45   Testing in progress  --  --      .Urine Clean Catch (Midstream)  12-24 @ 10:58   <10,000 CFU/ml Normal Urogenital juliane present  --  --          Radiology Results      Meds    MEDICATIONS  (STANDING):  artificial  tears Solution 1 Drop(s) Both EYES three times a day  atorvastatin 40 milliGRAM(s) Oral at bedtime  cyanocobalamin 1000 MICROGram(s) Oral daily  docusate sodium 100 milliGRAM(s) Oral daily  ergocalciferol 82939 Unit(s) Oral <User Schedule>  escitalopram 20 milliGRAM(s) Oral daily  heparin  Injectable 5000 Unit(s) SubCutaneous every 12 hours  immune   globulin 10% (GAMMAGARD) IVPB 30 Gram(s) IV Intermittent every 24 hours  insulin lispro (HumaLOG) corrective regimen sliding scale   SubCutaneous Before meals and at bedtime  levoFLOXacin  Tablet 500 milliGRAM(s) Oral every 24 hours  meclizine 25 milliGRAM(s) Oral every 8 hours  pantoprazole    Tablet 40 milliGRAM(s) Oral before breakfast  risperiDONE   Tablet 0.5 milliGRAM(s) Oral daily  senna 2 Tablet(s) Oral at bedtime      MEDICATIONS  (PRN):  aluminum hydroxide/magnesium hydroxide/simethicone Suspension 30 milliLiter(s) Oral every 6 hours PRN Dyspepsia  LORazepam     Tablet 1 milliGRAM(s) Oral daily PRN Anxiety      Physical Exam      Neuro :  no focal deficits  Respiratory: CTA B/L  CV: RRR, S1S2, no murmurs,   Abdominal: Soft, NT, ND +BS,  Extremities: No edema, + peripheral pulses    ASSESSMENT    Dizziness and giddiness   r/o cns path  vasovagal episode possibly 2nd acute drug reaction,   abdominal wall fluid collection,   h/o Anxiety  Depression  HLD (hyperlipidemia)  DM (diabetes mellitus)  HTN (hypertension)  No significant past surgical history      PLAN      ct head neg    MRI brain and MRA head neg for acute infarct or aneurysm, but with partial opacification of mastoid air cells noted above  f/u acetylcholine receptor antibody to eval for myasthenia gravis  s/p spinal tap  f/u fluid study  cont ivig day 4/5  neuro f/u to determine to cont or d/c ivig  cont aspirin atorvastatin  cont meclizine  cont levaquin 500mg daily x 7 days for possible mastoiditis to be completed 1/4/19  cardio f/u   ce x2 neg noted above  gi f/u  Epigastric burning; ? gastritis/ulcer  c/w maalox prn and PPI daily  avoid NSAID's  bowel regimen  plan for EGD/Colonoscopy once neurologically stable.   surg cons noted  cont current meds

## 2019-01-01 NOTE — PROGRESS NOTE ADULT - SUBJECTIVE AND OBJECTIVE BOX
CHIEF COMPLAINT:Patient is a 78y old  Male who presents with a chief complaint of dizziness.Pt still having intermittent cp.    	  REVIEW OF SYSTEMS:  CONSTITUTIONAL: No fever, weight loss, or fatigue  EYES: No eye pain, visual disturbances, or discharge  ENT:  No difficulty hearing, tinnitus, vertigo; No sinus or throat pain  NECK: No pain or stiffness  RESPIRATORY: No cough, wheezing, chills or hemoptysis; No Shortness of Breath  CARDIOVASCULAR: + chest pain,No  palpitations, passing out, + dizziness  GASTROINTESTINAL: No abdominal or epigastric pain. No nausea, vomiting, or hematemesis; No diarrhea or constipation. No melena or hematochezia.  GENITOURINARY: No dysuria, frequency, hematuria, or incontinence  NEUROLOGICAL: No headaches, memory loss, loss of strength, numbness, or tremors  SKIN: No itching, burning, rashes, or lesions   LYMPH Nodes: No enlarged glands  ENDOCRINE: No heat or cold intolerance; No hair loss  MUSCULOSKELETAL: No joint pain or swelling; No muscle, back, or extremity pain  PSYCHIATRIC: No depression, anxiety, mood swings, or difficulty sleeping  HEME/LYMPH: No easy bruising, or bleeding gums  ALLERGY AND IMMUNOLOGIC: No hives or eczema	      PHYSICAL EXAM:  T(C): 36.4 (01-01-19 @ 02:11), Max: 36.7 (12-31-18 @ 14:14)  HR: 75 (01-01-19 @ 05:50) (75 - 91)  BP: 136/75 (01-01-19 @ 05:50) (111/52 - 138/78)  RR: 18 (01-01-19 @ 05:50) (17 - 18)  SpO2: 96% (01-01-19 @ 05:50) (91% - 96%)  Wt(kg): --  I&O's Summary    31 Dec 2018 07:01  -  01 Jan 2019 07:00  --------------------------------------------------------  IN: 0 mL / OUT: 1800 mL / NET: -1800 mL        Appearance: Normal	  HEENT:   Normal oral mucosa, PERRL, EOMI	  Lymphatic: No lymphadenopathy  Cardiovascular: Normal S1 S2, No JVD, No murmurs, No edema  Respiratory: Lungs clear to auscultation	  Psychiatry: A & O x 3, Mood & affect appropriate  Gastrointestinal:  Soft, Non-tender, + BS	  Skin: No rashes, No ecchymoses, No cyanosis	  Extremities: Normal range of motion, No clubbing, cyanosis or edema  Vascular: Peripheral pulses palpable 2+ bilaterally    MEDICATIONS  (STANDING):  acetaminophen   Tablet .. 650 milliGRAM(s) Oral every 24 hours  artificial  tears Solution 1 Drop(s) Both EYES three times a day  atorvastatin 40 milliGRAM(s) Oral at bedtime  cyanocobalamin 1000 MICROGram(s) Oral daily  diphenhydrAMINE 50 milliGRAM(s) Oral every 24 hours  docusate sodium 100 milliGRAM(s) Oral daily  ergocalciferol 51758 Unit(s) Oral <User Schedule>  escitalopram 20 milliGRAM(s) Oral daily  heparin  Injectable 5000 Unit(s) SubCutaneous every 12 hours  immune   globulin 10% (GAMMAGARD) IVPB 30 Gram(s) IV Intermittent every 24 hours  immune   globulin 10% (GAMMAGARD) IVPB 30 Gram(s) IV Intermittent every 24 hours  insulin lispro (HumaLOG) corrective regimen sliding scale   SubCutaneous Before meals and at bedtime  levoFLOXacin  Tablet 500 milliGRAM(s) Oral every 24 hours  meclizine 25 milliGRAM(s) Oral every 8 hours  pantoprazole    Tablet 40 milliGRAM(s) Oral before breakfast  risperiDONE   Tablet 0.5 milliGRAM(s) Oral daily  senna 2 Tablet(s) Oral at bedtime      	  LABS:	 	    CARDIAC MARKERS:  CARDIAC MARKERS ( 31 Dec 2018 12:44 )  <0.015 ng/mL / x     / 85 U/L / x     / x      CARDIAC MARKERS ( 31 Dec 2018 00:16 )  <0.015 ng/mL / x     / 91 U/L / x     / 1.2 ng/mL                         13.5   6.1   )-----------( 214      ( 01 Jan 2019 06:47 )             41.2     01-01    135  |  99  |  13  ----------------------------<  133<H>  3.6   |  26  |  0.90    Ca    8.1<L>      01 Jan 2019 06:47      proBNP: Serum Pro-Brain Natriuretic Peptide: 59 pg/mL (12-23 @ 23:00)    Lipid Profile: Cholesterol 200    HDL 43      HgA1c: Hemoglobin A1C, Whole Blood: 7.9 % (12-24 @ 10:48)    TSH: Thyroid Stimulating Hormone, Serum: 2.39 uU/mL (12-24 @ 04:53)

## 2019-01-01 NOTE — PROGRESS NOTE ADULT - PROBLEM SELECTOR PLAN 1
-D/w Dr Forrest and Dr Mcdowell. Patient is persistently c/o chest pain since IVIG administration, although the cardiac enzymes and EKG is normal  -Stress test in AM 1/2/19. As per Dr. Forrest, patient has risk of MI 2/2 to IVIG     infusion, therefore patient needs to be closely monitored.  -Holding off IVIG tonight 1/1/19 due to stress test tomorrow

## 2019-01-01 NOTE — PROGRESS NOTE ADULT - ASSESSMENT
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  1.Tele monitoring.  2.Neurology f/u.  3. on IV IG.  4.CP-Cleared by neurology for stress test in am.  5.DM-insulin.  6.Cont asa,statin.  7.GI and DVT prophylaxis.

## 2019-01-02 LAB
ANION GAP SERPL CALC-SCNC: 9 MMOL/L — SIGNIFICANT CHANGE UP (ref 5–17)
BUN SERPL-MCNC: 15 MG/DL — SIGNIFICANT CHANGE UP (ref 7–18)
CALCIUM SERPL-MCNC: 8 MG/DL — LOW (ref 8.4–10.5)
CHLORIDE SERPL-SCNC: 101 MMOL/L — SIGNIFICANT CHANGE UP (ref 96–108)
CO2 SERPL-SCNC: 26 MMOL/L — SIGNIFICANT CHANGE UP (ref 22–31)
CREAT SERPL-MCNC: 0.93 MG/DL — SIGNIFICANT CHANGE UP (ref 0.5–1.3)
GAD65 AB SER-MCNC: 0 NMOL/L — SIGNIFICANT CHANGE UP
GLUCOSE BLDC GLUCOMTR-MCNC: 129 MG/DL — HIGH (ref 70–99)
GLUCOSE BLDC GLUCOMTR-MCNC: 140 MG/DL — HIGH (ref 70–99)
GLUCOSE BLDC GLUCOMTR-MCNC: 150 MG/DL — HIGH (ref 70–99)
GLUCOSE BLDC GLUCOMTR-MCNC: 158 MG/DL — HIGH (ref 70–99)
GLUCOSE SERPL-MCNC: 121 MG/DL — HIGH (ref 70–99)
HCT VFR BLD CALC: 42.7 % — SIGNIFICANT CHANGE UP (ref 39–50)
HGB BLD-MCNC: 13.7 G/DL — SIGNIFICANT CHANGE UP (ref 13–17)
MCHC RBC-ENTMCNC: 30.9 PG — SIGNIFICANT CHANGE UP (ref 27–34)
MCHC RBC-ENTMCNC: 32.1 GM/DL — SIGNIFICANT CHANGE UP (ref 32–36)
MCV RBC AUTO: 96.2 FL — SIGNIFICANT CHANGE UP (ref 80–100)
PLATELET # BLD AUTO: 219 K/UL — SIGNIFICANT CHANGE UP (ref 150–400)
POTASSIUM SERPL-MCNC: 3.5 MMOL/L — SIGNIFICANT CHANGE UP (ref 3.5–5.3)
POTASSIUM SERPL-SCNC: 3.5 MMOL/L — SIGNIFICANT CHANGE UP (ref 3.5–5.3)
RBC # BLD: 4.44 M/UL — SIGNIFICANT CHANGE UP (ref 4.2–5.8)
RBC # FLD: 12 % — SIGNIFICANT CHANGE UP (ref 10.3–14.5)
SODIUM SERPL-SCNC: 136 MMOL/L — SIGNIFICANT CHANGE UP (ref 135–145)
WBC # BLD: 7.7 K/UL — SIGNIFICANT CHANGE UP (ref 3.8–10.5)
WBC # FLD AUTO: 7.7 K/UL — SIGNIFICANT CHANGE UP (ref 3.8–10.5)

## 2019-01-02 PROCEDURE — 99232 SBSQ HOSP IP/OBS MODERATE 35: CPT

## 2019-01-02 RX ORDER — IMMUNE GLOBULIN (HUMAN) 10 G/100ML
30 INJECTION INTRAVENOUS; SUBCUTANEOUS EVERY 24 HOURS
Qty: 0 | Refills: 0 | Status: DISCONTINUED | OUTPATIENT
Start: 2019-01-02 | End: 2019-01-02

## 2019-01-02 RX ORDER — DIPHENHYDRAMINE HCL 50 MG
50 CAPSULE ORAL EVERY 24 HOURS
Qty: 0 | Refills: 0 | Status: DISCONTINUED | OUTPATIENT
Start: 2019-01-02 | End: 2019-01-02

## 2019-01-02 RX ORDER — ACETAMINOPHEN 500 MG
1000 TABLET ORAL EVERY 24 HOURS
Qty: 0 | Refills: 0 | Status: DISCONTINUED | OUTPATIENT
Start: 2019-01-02 | End: 2019-01-02

## 2019-01-02 RX ORDER — ACETAMINOPHEN 500 MG
650 TABLET ORAL EVERY 24 HOURS
Qty: 0 | Refills: 0 | Status: DISCONTINUED | OUTPATIENT
Start: 2019-01-02 | End: 2019-01-02

## 2019-01-02 RX ADMIN — Medication 25 MILLIGRAM(S): at 05:44

## 2019-01-02 RX ADMIN — Medication 1: at 21:47

## 2019-01-02 RX ADMIN — PANTOPRAZOLE SODIUM 40 MILLIGRAM(S): 20 TABLET, DELAYED RELEASE ORAL at 05:44

## 2019-01-02 RX ADMIN — Medication 1 DROP(S): at 15:15

## 2019-01-02 RX ADMIN — RISPERIDONE 0.5 MILLIGRAM(S): 4 TABLET ORAL at 12:30

## 2019-01-02 RX ADMIN — ATORVASTATIN CALCIUM 40 MILLIGRAM(S): 80 TABLET, FILM COATED ORAL at 21:47

## 2019-01-02 RX ADMIN — Medication 1 DROP(S): at 05:44

## 2019-01-02 RX ADMIN — Medication 25 MILLIGRAM(S): at 15:15

## 2019-01-02 RX ADMIN — Medication 1 DROP(S): at 21:46

## 2019-01-02 RX ADMIN — Medication 100 MILLIGRAM(S): at 15:14

## 2019-01-02 RX ADMIN — PREGABALIN 1000 MICROGRAM(S): 225 CAPSULE ORAL at 15:13

## 2019-01-02 RX ADMIN — ESCITALOPRAM OXALATE 20 MILLIGRAM(S): 10 TABLET, FILM COATED ORAL at 12:30

## 2019-01-02 RX ADMIN — ERGOCALCIFEROL 50000 UNIT(S): 1.25 CAPSULE ORAL at 15:14

## 2019-01-02 RX ADMIN — HEPARIN SODIUM 5000 UNIT(S): 5000 INJECTION INTRAVENOUS; SUBCUTANEOUS at 17:46

## 2019-01-02 RX ADMIN — Medication 25 MILLIGRAM(S): at 21:47

## 2019-01-02 NOTE — PROGRESS NOTE ADULT - SUBJECTIVE AND OBJECTIVE BOX
CHIEF COMPLAINT: Patient is a 78y old  Male who presents with a chief complaint of dizziness. Pt appears comfortable.    	  REVIEW OF SYSTEMS:  CONSTITUTIONAL: No fever, weight loss, or fatigue  EYES: No eye pain, visual disturbances, or discharge  ENT:  No difficulty hearing, tinnitus, vertigo; No sinus or throat pain  NECK: No pain or stiffness  RESPIRATORY: No cough, wheezing, chills or hemoptysis; No Shortness of Breath  CARDIOVASCULAR: No chest pain, palpitations, passing out, dizziness, or leg swelling  GASTROINTESTINAL: No abdominal or epigastric pain. No nausea, vomiting, or hematemesis; No diarrhea or constipation. No melena or hematochezia.  GENITOURINARY: No dysuria, frequency, hematuria, or incontinence  NEUROLOGICAL: No headaches, memory loss, loss of strength, numbness, or tremors  SKIN: No itching, burning, rashes, or lesions   LYMPH Nodes: No enlarged glands  ENDOCRINE: No heat or cold intolerance; No hair loss  MUSCULOSKELETAL: No joint pain or swelling; No muscle, back, or extremity pain  PSYCHIATRIC: No depression, anxiety, mood swings, or difficulty sleeping  HEME/LYMPH: No easy bruising, or bleeding gums  ALLERGY AND IMMUNOLOGIC: No hives or eczema	      PHYSICAL EXAM:  T(C): 36.6 (01-02-19 @ 06:06), Max: 36.6 (01-01-19 @ 14:13)  HR: 80 (01-02-19 @ 06:06) (80 - 89)  BP: 122/75 (01-02-19 @ 02:37) (104/75 - 142/77)  RR: 18 (01-02-19 @ 06:06) (16 - 18)  SpO2: 96% (01-02-19 @ 06:06) (96% - 96%)      Appearance: Normal	  HEENT:   Normal oral mucosa, PERRL, EOMI	  Lymphatic: No lymphadenopathy  Cardiovascular: Normal S1 S2, No JVD, No murmurs, No edema  Respiratory: Lungs clear to auscultation	  Psychiatry: A & O x 3, Mood & affect appropriate  Gastrointestinal:  Soft, Non-tender, + BS	  Skin: No rashes, No ecchymoses, No cyanosis	  Neurologic: Non-focal  Extremities: Normal range of motion, No clubbing, cyanosis or edema  Vascular: Peripheral pulses palpable 2+ bilaterally    MEDICATIONS  (STANDING):  artificial  tears Solution 1 Drop(s) Both EYES three times a day  atorvastatin 40 milliGRAM(s) Oral at bedtime  cyanocobalamin 1000 MICROGram(s) Oral daily  docusate sodium 100 milliGRAM(s) Oral daily  ergocalciferol 17119 Unit(s) Oral <User Schedule>  escitalopram 20 milliGRAM(s) Oral daily  heparin  Injectable 5000 Unit(s) SubCutaneous every 12 hours  insulin lispro (HumaLOG) corrective regimen sliding scale   SubCutaneous Before meals and at bedtime  levoFLOXacin  Tablet 500 milliGRAM(s) Oral every 24 hours  meclizine 25 milliGRAM(s) Oral every 8 hours  pantoprazole    Tablet 40 milliGRAM(s) Oral before breakfast  risperiDONE   Tablet 0.5 milliGRAM(s) Oral daily  senna 2 Tablet(s) Oral at bedtime        	  LABS:	 	    CARDIAC MARKERS:  CARDIAC MARKERS ( 31 Dec 2018 12:44 )  <0.015 ng/mL / x     / 85 U/L / x     / x                              13.7   7.7   )-----------( 219      ( 02 Jan 2019 06:44 )             42.7     01-02    136  |  101  |  15  ----------------------------<  121<H>  3.5   |  26  |  0.93    Ca    8.0<L>      02 Jan 2019 06:44      proBNP: Serum Pro-Brain Natriuretic Peptide: 59 pg/mL (12-23 @ 23:00)    Lipid Profile: Cholesterol 200    HDL 43      HgA1c: Hemoglobin A1C, Whole Blood: 7.9 % (12-24 @ 10:48)    TSH: Thyroid Stimulating Hormone, Serum: 2.39 uU/mL (12-24 @ 04:53)

## 2019-01-02 NOTE — PROGRESS NOTE ADULT - PROBLEM SELECTOR PLAN 1
-D/w Dr Forrest and Dr Mcdowell. Patient is persistently c/o chest pain since IVIG administration, although the cardiac enzymes and EKG is normal  -Stress test in AM today 1/2/19. As per Dr. Forrest, patient has risk of MI 2/2 to IVIG infusion, therefore patient needs to be closely monitored.  -IVIG held yesterday 1/1/19 due to stress test today

## 2019-01-02 NOTE — PROGRESS NOTE ADULT - SUBJECTIVE AND OBJECTIVE BOX
Summary:   8y Male  presents with a chief complaint of dizziness, now GI to evaluate for c/o abdominal pain. The patient has a significant past medical history of Anxiety, Depression HLD (hyperlipidemia), DM (diabetes mellitus), and HTN (hypertension)             .   Subjective: continues with c/o dizziness.  VSS      Objective:    MEDICATIONS  (STANDING):  artificial  tears Solution 1 Drop(s) Both EYES three times a day  atorvastatin 40 milliGRAM(s) Oral at bedtime  cyanocobalamin 1000 MICROGram(s) Oral daily  docusate sodium 100 milliGRAM(s) Oral daily  ergocalciferol 55000 Unit(s) Oral <User Schedule>  escitalopram 20 milliGRAM(s) Oral daily  heparin  Injectable 5000 Unit(s) SubCutaneous every 12 hours  insulin lispro (HumaLOG) corrective regimen sliding scale   SubCutaneous Before meals and at bedtime  levoFLOXacin  Tablet 500 milliGRAM(s) Oral every 24 hours  meclizine 25 milliGRAM(s) Oral every 8 hours  pantoprazole    Tablet 40 milliGRAM(s) Oral before breakfast  risperiDONE   Tablet 0.5 milliGRAM(s) Oral daily  senna 2 Tablet(s) Oral at bedtime    MEDICATIONS  (PRN):  acetaminophen   Tablet .. 650 milliGRAM(s) Oral every 6 hours PRN Temp greater or equal to 38C (100.4F), Mild Pain (1 - 3), Moderate Pain (4 - 6)  aluminum hydroxide/magnesium hydroxide/simethicone Suspension 30 milliLiter(s) Oral every 6 hours PRN Dyspepsia  LORazepam     Tablet 1 milliGRAM(s) Oral daily PRN Anxiety              Vital Signs Last 24 Hrs  T(C): 36.7 (02 Jan 2019 10:14), Max: 36.7 (02 Jan 2019 10:14)  T(F): 98 (02 Jan 2019 10:14), Max: 98 (02 Jan 2019 10:14)  HR: 90 (02 Jan 2019 10:14) (80 - 90)  BP: 140/82 (02 Jan 2019 10:14) (104/75 - 142/77)  BP(mean): --  RR: 16 (02 Jan 2019 10:14) (16 - 18)  SpO2: 95% (02 Jan 2019 10:14) (95% - 96%)      General:  Well developed, well nourished, alert and active, no pallor, NAD.  HEENT:    Normal appearance of conjunctiva, ears, nose, lips, oropharynx, and oral mucosa, anicteric.  Neck:  No masses, no asymmetry.  Lymph Nodes:  No lymphadenopathy.   Cardiovascular:  RRR normal S1/S2, no murmur.  Respiratory:  CTA B/L, normal respiratory effort.   Abdominal:   soft, no masses or tenderness, normoactive BS, NT/ND, no HSM.  Extremities:   No clubbing or cyanosis, normal capillary refill, no edema.   Skin:   No rash, jaundice, lesions, eczema.   Musculoskeletal:  No joint swelling, erythema or tenderness.   Neuro: No focal deficits.   Other:       LABS:                        13.7   7.7   )-----------( 219      ( 02 Jan 2019 06:44 )             42.7     01-02    136  |  101  |  15  ----------------------------<  121<H>  3.5   |  26  |  0.93    Ca    8.0<L>      02 Jan 2019 06:44            RADIOLOGY & ADDITIONAL TESTS:

## 2019-01-02 NOTE — PROGRESS NOTE ADULT - ATTENDING COMMENTS
I was physically present for the key portions of the evaluation and management (E/M) service provided.  The patient was personally seen and examined at bedside.  I reviewed the resident's/NP  H& P , ROS,  Exam, assessment and plan. VS and labs  were personally reviewed.   I agree with  the all of the above with the following additions:    Summary:    Given concern for GBS I would avoid anesthesia for a mostly elective procedure. Once his dizziness has improved we can consider an outpatient workup of he GERD and anemia.           Thank you for your consultation and allowing  me to participate in the care of your patients. If you have further questions please contact me at [phone].     Steve Burgess M.D.
Patient c/o with SOB and CP with IVIG, continues to have dizziness.  Diplopia resolved.  On exam ENRIQUE, EOMI, VFF, strenght full with normal sensation.  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  Pending MG Ab's and outpatient ncs,emg.  Will hold IVIG for now due to SOB and CP with it, primary team to evaluate patient for MI and PE.

## 2019-01-02 NOTE — PROGRESS NOTE ADULT - SUBJECTIVE AND OBJECTIVE BOX
Neurology Follow up note    Name  ITALO ROGER    Interval History -No reported events    Subjective: States to "feeling very weak and dizzy."  States dizziness comes and goes, described as he's spinning.  However, denied dizziness at this time.  States that "I can stand up."  Reports stomach burning and pain.  Denies HA, lightheadedness, SOB, or CP.  States that he "can see in front" of him "but not the sides."        MEDICATIONS  (STANDING):  acetaminophen   Tablet .. 650 milliGRAM(s) Oral every 24 hours  artificial  tears Solution 1 Drop(s) Both EYES three times a day  atorvastatin 40 milliGRAM(s) Oral at bedtime  cyanocobalamin 1000 MICROGram(s) Oral daily  diphenhydrAMINE 50 milliGRAM(s) Oral every 24 hours  docusate sodium 100 milliGRAM(s) Oral daily  ergocalciferol 60243 Unit(s) Oral <User Schedule>  escitalopram 20 milliGRAM(s) Oral daily  heparin  Injectable 5000 Unit(s) SubCutaneous every 12 hours  immune   globulin 10% (GAMMAGARD) IVPB 30 Gram(s) IV Intermittent every 24 hours  insulin lispro (HumaLOG) corrective regimen sliding scale   SubCutaneous Before meals and at bedtime  levoFLOXacin  Tablet 500 milliGRAM(s) Oral every 24 hours  meclizine 25 milliGRAM(s) Oral every 8 hours  pantoprazole    Tablet 40 milliGRAM(s) Oral before breakfast  risperiDONE   Tablet 0.5 milliGRAM(s) Oral daily  senna 2 Tablet(s) Oral at bedtime    MEDICATIONS  (PRN):  acetaminophen   Tablet .. 650 milliGRAM(s) Oral every 6 hours PRN Temp greater or equal to 38C (100.4F), Mild Pain (1 - 3), Moderate Pain (4 - 6)  aluminum hydroxide/magnesium hydroxide/simethicone Suspension 30 milliLiter(s) Oral every 6 hours PRN Dyspepsia  LORazepam     Tablet 1 milliGRAM(s) Oral daily PRN Anxiety      Allergies    penicillin (Hives)    Intolerances        Review of Systems:  General: [X ] None, [ ] chills, [ ]fatigue, [ ] fevers  Skin: [ ] None, [X ] rash   HEENT: [ ] None, [ ] head injury, [X ] blurred vision, [ ] double vision, [X ] eye pain, [ ] visual loss, [ ] hearing loss, [ ] deafness, [ ] ear pain, [ ] ringing in the ears, [ ] vertigo, [ ] sinus pain, [ ] voice changes  Neck: [X ] None, [ ] neck stiffness  Respiratory: [X ] None, [ ] cough, [ ] difficulty breathing  Cardiovascular: [X ] None, [ ] calf cramps, [ ] chest pain, [ ] leg pain, [ ] swelling, [ ] rapid heart rate, [ ] shortness of breath  Gastrointestinal: [ ] None, [X ] abdominal pain, [ ] nausea, [ ] vomiting  Musculoskeletal: [ ] None, [ ] back pain, [ ] joint pain, [ ] joint stiffness, [ ] leg cramps, [ ] muscle atrophy, [ ] muscle cramps, [X ] muscle weakness, [ ] swelling of extremities  Neurological: [ ] None, [X ] Dizziness, [ ] decreased memory, [ ] fainting, [ ] focal neurological symptoms, [ ] headaches, [ ] incontinence of stool, [ ] incontinence of urine, [ ] loss of consciousness, [ ] numbness, [ ] seizures, [X ] spinning sensation, [ ] stroke, [ ] trouble walking, [ ] unsteadiness, [ ] visual changes, [ ] weakness  Psychiatric: [X ] None,  [ ] depression, [ ] anxiety, [ ] hallucinations, [ ] inability to concentrate, [ ] mood changes, [ ] panic attacks  Hematology: [ X] None,  [ ] blood clots, [ ] spontaneous bleeding      Objective:   Vital Signs Last 24 Hrs  T(C): 36.7 (02 Jan 2019 10:14), Max: 36.7 (02 Jan 2019 10:14)  HR: 90 (02 Jan 2019 10:14) (80 - 90)  BP: 140/82 (02 Jan 2019 10:14) (104/75 - 142/77)  RR: 16 (02 Jan 2019 10:14) (16 - 18)  SpO2: 95% (02 Jan 2019 10:14) (95% - 96%)    General Exam:   General appearance: No acute distress                 Cardiovascular: Pedal dorsalis pulses intact bilaterally    Neurological Exam:  Mental Status: Orientated to self, date and place.  Attention intact.  No dysarthria, aphasia or neglect.  Knowledge intact.  Registration intact.  Short and long term memory grossly intact.      Cranial Nerves: CN I - not tested.  PERRL, EOMI, Visual fields not full.  Abnormal extraocular movements, unable to follow pen into periphery. CN V1-3 intact to light touch and pinprick.  No facial asymmetry.  Hearing intact to finger rub bilaterally.  Tongue, uvula and palate midline.  Sternocleidomastoid and Trapezius intact bilaterally.    Motor:   Tone: normal.                  Strength: 5/5 intact throughout b/l UE, 4/5 intact throughout b/l LE  Pronator drift: None                 Dysmetria: None to finger-nose-finger, unable to perform heel-shin-heel  No truncal ataxia.    Tremor: No resting, postural or action tremor.  No myoclonus.    Sensation: intact to light touch & pinprick   Deep Tendon Reflexes: 1+ bilateral biceps, triceps, brachioradialis, knee and ankle  Toes flexor bilaterally    Gait: Normal, observed transferring from stretcher to bed     Other:    01-02    136  |  101  |  15  ----------------------------<  121<H>  3.5   |  26  |  0.93    Ca    8.0<L>      02 Jan 2019 06:44      01-02    136  |  101  |  15  ----------------------------<  121<H>  3.5   |  26  |  0.93    Ca    8.0<L>      02 Jan 2019 06:44 Neurology Follow up note    Name  ITALO ROGER    Interval History -No reported events    Subjective: States to "feeling very weak and dizzy."  States dizziness comes and goes, described as he's spinning.  However, denied dizziness at this time.  States that "I can stand up."  Reports stomach burning and pain.  Denies HA, lightheadedness, SOB, or CP.  States that he "can see in front" of him "but not the sides."        MEDICATIONS  (STANDING):  acetaminophen   Tablet .. 650 milliGRAM(s) Oral every 24 hours  artificial  tears Solution 1 Drop(s) Both EYES three times a day  atorvastatin 40 milliGRAM(s) Oral at bedtime  cyanocobalamin 1000 MICROGram(s) Oral daily  diphenhydrAMINE 50 milliGRAM(s) Oral every 24 hours  docusate sodium 100 milliGRAM(s) Oral daily  ergocalciferol 58498 Unit(s) Oral <User Schedule>  escitalopram 20 milliGRAM(s) Oral daily  heparin  Injectable 5000 Unit(s) SubCutaneous every 12 hours  immune   globulin 10% (GAMMAGARD) IVPB 30 Gram(s) IV Intermittent every 24 hours  insulin lispro (HumaLOG) corrective regimen sliding scale   SubCutaneous Before meals and at bedtime  levoFLOXacin  Tablet 500 milliGRAM(s) Oral every 24 hours  meclizine 25 milliGRAM(s) Oral every 8 hours  pantoprazole    Tablet 40 milliGRAM(s) Oral before breakfast  risperiDONE   Tablet 0.5 milliGRAM(s) Oral daily  senna 2 Tablet(s) Oral at bedtime    MEDICATIONS  (PRN):  acetaminophen   Tablet .. 650 milliGRAM(s) Oral every 6 hours PRN Temp greater or equal to 38C (100.4F), Mild Pain (1 - 3), Moderate Pain (4 - 6)  aluminum hydroxide/magnesium hydroxide/simethicone Suspension 30 milliLiter(s) Oral every 6 hours PRN Dyspepsia  LORazepam     Tablet 1 milliGRAM(s) Oral daily PRN Anxiety      Allergies    penicillin (Hives)    Intolerances        Review of Systems:  General: [X ] None, [ ] chills, [ ]fatigue, [ ] fevers  Skin: [ ] None, [X ] rash   HEENT: [ ] None, [ ] head injury, [X ] blurred vision, [ ] double vision, [X ] eye pain, [ ] visual loss, [ ] hearing loss, [ ] deafness, [ ] ear pain, [ ] ringing in the ears, [ ] vertigo, [ ] sinus pain, [ ] voice changes  Neck: [X ] None, [ ] neck stiffness  Respiratory: [X ] None, [ ] cough, [ ] difficulty breathing  Cardiovascular: [X ] None, [ ] calf cramps, [ ] chest pain, [ ] leg pain, [ ] swelling, [ ] rapid heart rate, [ ] shortness of breath  Gastrointestinal: [ ] None, [X ] abdominal pain, [ ] nausea, [ ] vomiting  Musculoskeletal: [ ] None, [ ] back pain, [ ] joint pain, [ ] joint stiffness, [ ] leg cramps, [ ] muscle atrophy, [ ] muscle cramps, [X ] muscle weakness, [ ] swelling of extremities  Neurological: [ ] None, [X ] Dizziness, [ ] decreased memory, [ ] fainting, [ ] focal neurological symptoms, [ ] headaches, [ ] incontinence of stool, [ ] incontinence of urine, [ ] loss of consciousness, [ ] numbness, [ ] seizures, [X ] spinning sensation, [ ] stroke, [ ] trouble walking, [ ] unsteadiness, [ ] visual changes, [ ] weakness  Psychiatric: [X ] None,  [ ] depression, [ ] anxiety, [ ] hallucinations, [ ] inability to concentrate, [ ] mood changes, [ ] panic attacks  Hematology: [ X] None,  [ ] blood clots, [ ] spontaneous bleeding      Objective:   Vital Signs Last 24 Hrs  T(C): 36.7 (02 Jan 2019 10:14), Max: 36.7 (02 Jan 2019 10:14)  HR: 90 (02 Jan 2019 10:14) (80 - 90)  BP: 140/82 (02 Jan 2019 10:14) (104/75 - 142/77)  RR: 16 (02 Jan 2019 10:14) (16 - 18)  SpO2: 95% (02 Jan 2019 10:14) (95% - 96%)    General Exam:   General appearance: No acute distress                 Cardiovascular: Pedal dorsalis pulses intact bilaterally    Neurological Exam:  Mental Status: Orientated to self, date and place.  Attention intact.  No dysarthria, aphasia or neglect.  Knowledge intact.  Registration intact.  Short and long term memory grossly intact.      Cranial Nerves: CN I - not tested.  PERRL, EOMI, Visual fields not full.  VFF.  No nystagmus CN V1-3 intact to light touch and pinprick.  No facial asymmetry.  Hearing intact to finger rub bilaterally.  Tongue, uvula and palate midline.  Sternocleidomastoid and Trapezius intact bilaterally.    Motor:   Tone: normal.                  Strength: 5/5 intact throughout x 4 ext  Pronator drift: None                 Dysmetria: None to finger-nose-finger, unable to perform heel-shin-heel  No truncal ataxia.    Tremor: No resting, postural or action tremor.  No myoclonus.  Sensation: intact to light touch & pinprick   Deep Tendon Reflexes: 1+ bilateral biceps, triceps, brachioradialis, knee and ankle  Toes flexor bilaterally    Gait: Normal, observed transferring from stretcher to bed     Other:    01-02    136  |  101  |  15  ----------------------------<  121<H>  3.5   |  26  |  0.93    Ca    8.0<L>      02 Jan 2019 06:44      01-02    136  |  101  |  15  ----------------------------<  121<H>  3.5   |  26  |  0.93    Ca    8.0<L>      02 Jan 2019 06:44

## 2019-01-02 NOTE — PROGRESS NOTE ADULT - PROBLEM SELECTOR PLAN 2
Patient p/w dizziness, diaphoresis, nausea and face flushing after taking Cefuroxime. Symptoms most likely due to allergic reaction. ECG: NSR VR@92bpm T1: negative. CT brain: no acute pathology. MRI/MRA: neg for acute infarct or aneurysm but chronic microvascular ischemic changes; Attenuation distal left posterior cerebral artery. Possible mastoiditis. Echo: grossly normal LV function, mild aortic stenosis, grade 1 DD.  -Neuro Dr Lon mcdonald paraneoplastic antibody profile; spinal tap.   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 Outpt NCV after discharge to differentiate between the two  -F/u acetylcholine receptor antibody to eval for myasthenia gravis  -F/u CSF studies  -Pt reported CP, SOB, itchiness during IVIG 2/5 treatment on 12/30  -Pt tolerated IVIG injection 12/31 with premedications suggested by Neuro  -Patient has completed 3/5 administrations so far  -Depending on patient's condition today 1/2, will start IVIG

## 2019-01-02 NOTE — PROGRESS NOTE ADULT - ASSESSMENT
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  1.Tele monitoring.  2.Neurology f/u.  3. IV IG on hold as per neurology until after stress test.  4.CP-Cleared by neurology for stress test today.  5.DM-insulin.  6.Cont asa,statin.  7.GI and DVT prophylaxis.

## 2019-01-02 NOTE — PROGRESS NOTE ADULT - SUBJECTIVE AND OBJECTIVE BOX
PGY 1 Note discussed with supervising resident and primary attending    Patient is a 78y old  Male who presents with a chief complaint of dizziness (01 Jan 2019 10:33)      INTERVAL HPI/OVERNIGHT EVENTS: No acute events overnight, remains afebrile; HD stable, H/H stable, WBC WNL  Pt clinically stable, no new medical problems reported.     MEDICATIONS  (STANDING):  artificial  tears Solution 1 Drop(s) Both EYES three times a day  atorvastatin 40 milliGRAM(s) Oral at bedtime  cyanocobalamin 1000 MICROGram(s) Oral daily  docusate sodium 100 milliGRAM(s) Oral daily  ergocalciferol 73294 Unit(s) Oral <User Schedule>  escitalopram 20 milliGRAM(s) Oral daily  heparin  Injectable 5000 Unit(s) SubCutaneous every 12 hours  insulin lispro (HumaLOG) corrective regimen sliding scale   SubCutaneous Before meals and at bedtime  levoFLOXacin  Tablet 500 milliGRAM(s) Oral every 24 hours  meclizine 25 milliGRAM(s) Oral every 8 hours  pantoprazole    Tablet 40 milliGRAM(s) Oral before breakfast  risperiDONE   Tablet 0.5 milliGRAM(s) Oral daily  senna 2 Tablet(s) Oral at bedtime    MEDICATIONS  (PRN):  acetaminophen   Tablet .. 650 milliGRAM(s) Oral every 6 hours PRN Temp greater or equal to 38C (100.4F), Mild Pain (1 - 3), Moderate Pain (4 - 6)  aluminum hydroxide/magnesium hydroxide/simethicone Suspension 30 milliLiter(s) Oral every 6 hours PRN Dyspepsia  LORazepam     Tablet 1 milliGRAM(s) Oral daily PRN Anxiety      __________________________________________________  REVIEW OF SYSTEMS:    CONSTITUTIONAL: No fever,   EYES: Blurry vision and eye pain as above  NECK: No stiffness. Neck pain as above  RESPIRATORY: No cough; No shortness of breath  CARDIOVASCULAR: No chest pain, no palpitations  GASTROINTESTINAL: No pain. No nausea or vomiting; No diarrhea   NEUROLOGICAL: No headache or numbness, no tremors  MUSCULOSKELETAL: No joint pain, no muscle pain  GENITOURINARY: no dysuria, no frequency, no hesitancy      Vital Signs Last 24 Hrs  T(C): 36.6 (02 Jan 2019 06:06), Max: 37.1 (01 Jan 2019 10:35)  T(F): 97.9 (02 Jan 2019 06:06), Max: 98.8 (01 Jan 2019 10:35)  HR: 80 (02 Jan 2019 06:06) (77 - 89)  BP: 122/75 (02 Jan 2019 02:37) (104/75 - 142/77)  BP(mean): --  RR: 18 (02 Jan 2019 06:06) (16 - 18)  SpO2: 96% (02 Jan 2019 06:06) (96% - 98%)    ________________________________________________  PHYSICAL EXAM:    GENERAL: NAD  HEENT: Normocephalic;  conjunctivae and sclerae clear; moist mucous membranes;   NECK : supple  CHEST/LUNG: Clear to auscultation bilaterally with good air entry   HEART: S1 S2  regular; no murmurs, gallops or rubs  ABDOMEN: Soft, Nontender, Nondistended; Bowel sounds present  EXTREMITIES: no cyanosis; no edema; no calf tenderness  SKIN: warm and dry; no rash  NERVOUS SYSTEM:  Awake and alert; no new deficits    _________________________________________________  LABS:                        13.7   7.7   )-----------( 219      ( 02 Jan 2019 06:44 )             42.7     01-02    136  |  101  |  15  ----------------------------<  121<H>  3.5   |  26  |  0.93    Ca    8.0<L>      02 Jan 2019 06:44          CAPILLARY BLOOD GLUCOSE      POCT Blood Glucose.: 129 mg/dL (02 Jan 2019 07:59)  POCT Blood Glucose.: 154 mg/dL (01 Jan 2019 20:56)  POCT Blood Glucose.: 177 mg/dL (01 Jan 2019 16:32)  POCT Blood Glucose.: 171 mg/dL (01 Jan 2019 11:28)        RADIOLOGY & ADDITIONAL TESTS:    Imaging Personally Reviewed:  YES    Consultant(s) Notes Reviewed:   YES    Care Discussed with Consultants :     Plan of care was discussed with patient and /or primary care giver; all questions and concerns were addressed and care was aligned with patient's wishes.

## 2019-01-02 NOTE — PROGRESS NOTE ADULT - PROBLEM SELECTOR PLAN 1
Completed IVIG  Outpt neurology f/u for NCV testing Completed IVIG  Outpt neurology f/u for NCV testing  Consider Rehab for continued weakness

## 2019-01-02 NOTE — PROGRESS NOTE ADULT - PROBLEM SELECTOR PLAN 1
patient reports improvement in burning pain  c/w maalox prn and PPI daily  avoid NSAID's  bowel regimen  plan for EGD/Colonoscopy once neurologically stable.

## 2019-01-02 NOTE — PROGRESS NOTE ADULT - SUBJECTIVE AND OBJECTIVE BOX
Patient is a 78y old  Male who presents with a chief complaint of dizziness (02 Jan 2019 12:47)    pt seen in icu [  ], reg med floor [   ], bed [  ], chair at bedside [   ], a+o x3 [  ], lethargic [  ],  nad [  ]    olson [  ], ngt [  ], peg [  ], et tube [  ], cent line [  ], picc line [  ]        Allergies    penicillin (Hives)        Vitals    T(F): 98 (01-02-19 @ 10:14), Max: 98 (01-02-19 @ 10:14)  HR: 90 (01-02-19 @ 10:14) (80 - 90)  BP: 140/82 (01-02-19 @ 10:14) (104/75 - 142/77)  RR: 16 (01-02-19 @ 10:14) (16 - 18)  SpO2: 95% (01-02-19 @ 10:14) (95% - 96%)  Wt(kg): --  CAPILLARY BLOOD GLUCOSE      POCT Blood Glucose.: 140 mg/dL (02 Jan 2019 11:38)      Labs                          13.7   7.7   )-----------( 219      ( 02 Jan 2019 06:44 )             42.7       01-02    136  |  101  |  15  ----------------------------<  121<H>  3.5   |  26  |  0.93    Ca    8.0<L>      02 Jan 2019 06:44              .CSF CSF  12-29 @ 00:45   Testing in progress  --  --      .Urine Clean Catch (Midstream)  12-24 @ 10:58   <10,000 CFU/ml Normal Urogenital juliane present  --  --          Radiology Results      Meds    MEDICATIONS  (STANDING):  acetaminophen   Tablet .. 650 milliGRAM(s) Oral every 24 hours  artificial  tears Solution 1 Drop(s) Both EYES three times a day  atorvastatin 40 milliGRAM(s) Oral at bedtime  cyanocobalamin 1000 MICROGram(s) Oral daily  diphenhydrAMINE 50 milliGRAM(s) Oral every 24 hours  docusate sodium 100 milliGRAM(s) Oral daily  ergocalciferol 03731 Unit(s) Oral <User Schedule>  escitalopram 20 milliGRAM(s) Oral daily  heparin  Injectable 5000 Unit(s) SubCutaneous every 12 hours  immune   globulin 10% (GAMMAGARD) IVPB 30 Gram(s) IV Intermittent every 24 hours  insulin lispro (HumaLOG) corrective regimen sliding scale   SubCutaneous Before meals and at bedtime  levoFLOXacin  Tablet 500 milliGRAM(s) Oral every 24 hours  meclizine 25 milliGRAM(s) Oral every 8 hours  pantoprazole    Tablet 40 milliGRAM(s) Oral before breakfast  risperiDONE   Tablet 0.5 milliGRAM(s) Oral daily  senna 2 Tablet(s) Oral at bedtime      MEDICATIONS  (PRN):  acetaminophen   Tablet .. 650 milliGRAM(s) Oral every 6 hours PRN Temp greater or equal to 38C (100.4F), Mild Pain (1 - 3), Moderate Pain (4 - 6)  aluminum hydroxide/magnesium hydroxide/simethicone Suspension 30 milliLiter(s) Oral every 6 hours PRN Dyspepsia  LORazepam     Tablet 1 milliGRAM(s) Oral daily PRN Anxiety      Physical Exam    Neuro :  no focal deficits  Respiratory: CTA B/L  CV: RRR, S1S2, no murmurs,   Abdominal: Soft, NT, ND +BS,  Extremities: No edema, + peripheral pulses    ASSESSMENT    Dizziness and giddiness  Anxiety  Depression  HLD (hyperlipidemia)  DM (diabetes mellitus)  HTN (hypertension)  No significant past surgical history      PLAN Patient is a 78y old  Male who presents with a chief complaint of dizziness (02 Jan 2019 12:47)    pt seen in icu [  ], reg med floor [ x  ], bed [ x ], chair at bedside [   ], a+o x3 [ x ], lethargic [  ],  nad [ x ]      Allergies    penicillin (Hives)        Vitals    T(F): 98 (01-02-19 @ 10:14), Max: 98 (01-02-19 @ 10:14)  HR: 90 (01-02-19 @ 10:14) (80 - 90)  BP: 140/82 (01-02-19 @ 10:14) (104/75 - 142/77)  RR: 16 (01-02-19 @ 10:14) (16 - 18)  SpO2: 95% (01-02-19 @ 10:14) (95% - 96%)  Wt(kg): --  CAPILLARY BLOOD GLUCOSE      POCT Blood Glucose.: 140 mg/dL (02 Jan 2019 11:38)      Labs                          13.7   7.7   )-----------( 219      ( 02 Jan 2019 06:44 )             42.7       01-02    136  |  101  |  15  ----------------------------<  121<H>  3.5   |  26  |  0.93    Ca    8.0<L>      02 Jan 2019 06:44              .CSF CSF  12-29 @ 00:45   Testing in progress  --  --      .Urine Clean Catch (Midstream)  12-24 @ 10:58   <10,000 CFU/ml Normal Urogenital juliane present  --  --          Radiology Results      Meds    MEDICATIONS  (STANDING):  acetaminophen   Tablet .. 650 milliGRAM(s) Oral every 24 hours  artificial  tears Solution 1 Drop(s) Both EYES three times a day  atorvastatin 40 milliGRAM(s) Oral at bedtime  cyanocobalamin 1000 MICROGram(s) Oral daily  diphenhydrAMINE 50 milliGRAM(s) Oral every 24 hours  docusate sodium 100 milliGRAM(s) Oral daily  ergocalciferol 18551 Unit(s) Oral <User Schedule>  escitalopram 20 milliGRAM(s) Oral daily  heparin  Injectable 5000 Unit(s) SubCutaneous every 12 hours  immune   globulin 10% (GAMMAGARD) IVPB 30 Gram(s) IV Intermittent every 24 hours  insulin lispro (HumaLOG) corrective regimen sliding scale   SubCutaneous Before meals and at bedtime  levoFLOXacin  Tablet 500 milliGRAM(s) Oral every 24 hours  meclizine 25 milliGRAM(s) Oral every 8 hours  pantoprazole    Tablet 40 milliGRAM(s) Oral before breakfast  risperiDONE   Tablet 0.5 milliGRAM(s) Oral daily  senna 2 Tablet(s) Oral at bedtime      MEDICATIONS  (PRN):  acetaminophen   Tablet .. 650 milliGRAM(s) Oral every 6 hours PRN Temp greater or equal to 38C (100.4F), Mild Pain (1 - 3), Moderate Pain (4 - 6)  aluminum hydroxide/magnesium hydroxide/simethicone Suspension 30 milliLiter(s) Oral every 6 hours PRN Dyspepsia  LORazepam     Tablet 1 milliGRAM(s) Oral daily PRN Anxiety      Physical Exam      Neuro :  no focal deficits  Respiratory: CTA B/L  CV: RRR, S1S2, no murmurs,   Abdominal: Soft, NT, ND +BS,  Extremities: No edema, + peripheral pulses    ASSESSMENT    Dizziness and giddiness   r/o cns path  vasovagal episode possibly 2nd acute drug reaction,   abdominal wall fluid collection,   h/o Anxiety  Depression  HLD (hyperlipidemia)  DM (diabetes mellitus)  HTN (hypertension)  No significant past surgical history      PLAN      ct head neg  MRI brain and MRA head neg for acute infarct or aneurysm, but with partial opacification of mastoid air cells noted above  f/u acetylcholine receptor antibody to eval for myasthenia gravis  s/p spinal tap  f/u fluid study  cont ivig completed day 3/5, day 4 held  cont aspirin atorvastatin  cont meclizine  cont levaquin 500mg daily x 7 days for possible mastoiditis to be completed 1/4/19  cardio f/u   ce x2 neg noted above  pt for stress test today  gi f/u noted   Epigastric burning; ? gastritis/ulcer  c/w maalox prn and PPI daily  avoid NSAID's  bowel regimen  plan for EGD/Colonoscopy once neurologically stable.   surg cons noted  cont current meds

## 2019-01-02 NOTE — PROGRESS NOTE ADULT - ASSESSMENT
Patient seen and examined sitting up in bed in NAD. He continues to have epigastric burning but states it has improved. He continues with c/o of dizziness. He denies chest pain, SOB, N/V, or palpitations. No diarrhea, has hx of constipation. Tolerating DASH diet. Currently receiving IVIG for GBS which was held 2/2 stress test.  S/P nuclear stress test this am revealing left ventricle normal in size. Normal myocardial perfusion scan, with no evidence of infarction or inducible ischemia.

## 2019-01-03 LAB
ACRM MODULATING ANTIBODY: 0 NMOL/L — SIGNIFICANT CHANGE UP
ANION GAP SERPL CALC-SCNC: 7 MMOL/L — SIGNIFICANT CHANGE UP (ref 5–17)
BUN SERPL-MCNC: 13 MG/DL — SIGNIFICANT CHANGE UP (ref 7–18)
CALCIUM SERPL-MCNC: 8.2 MG/DL — LOW (ref 8.4–10.5)
CHLORIDE SERPL-SCNC: 102 MMOL/L — SIGNIFICANT CHANGE UP (ref 96–108)
CO2 SERPL-SCNC: 27 MMOL/L — SIGNIFICANT CHANGE UP (ref 22–31)
CREAT SERPL-MCNC: 0.99 MG/DL — SIGNIFICANT CHANGE UP (ref 0.5–1.3)
GLUCOSE BLDC GLUCOMTR-MCNC: 140 MG/DL — HIGH (ref 70–99)
GLUCOSE BLDC GLUCOMTR-MCNC: 172 MG/DL — HIGH (ref 70–99)
GLUCOSE BLDC GLUCOMTR-MCNC: 185 MG/DL — HIGH (ref 70–99)
GLUCOSE BLDC GLUCOMTR-MCNC: 192 MG/DL — HIGH (ref 70–99)
GLUCOSE SERPL-MCNC: 132 MG/DL — HIGH (ref 70–99)
HCT VFR BLD CALC: 41 % — SIGNIFICANT CHANGE UP (ref 39–50)
HGB BLD-MCNC: 13.2 G/DL — SIGNIFICANT CHANGE UP (ref 13–17)
MCHC RBC-ENTMCNC: 31 PG — SIGNIFICANT CHANGE UP (ref 27–34)
MCHC RBC-ENTMCNC: 32.2 GM/DL — SIGNIFICANT CHANGE UP (ref 32–36)
MCV RBC AUTO: 96.1 FL — SIGNIFICANT CHANGE UP (ref 80–100)
OLIGOCLONAL BANDS CSF ELPH-IMP: SIGNIFICANT CHANGE UP
PLATELET # BLD AUTO: 235 K/UL — SIGNIFICANT CHANGE UP (ref 150–400)
POTASSIUM SERPL-MCNC: 3.9 MMOL/L — SIGNIFICANT CHANGE UP (ref 3.5–5.3)
POTASSIUM SERPL-SCNC: 3.9 MMOL/L — SIGNIFICANT CHANGE UP (ref 3.5–5.3)
RBC # BLD: 4.27 M/UL — SIGNIFICANT CHANGE UP (ref 4.2–5.8)
RBC # FLD: 12.1 % — SIGNIFICANT CHANGE UP (ref 10.3–14.5)
SODIUM SERPL-SCNC: 136 MMOL/L — SIGNIFICANT CHANGE UP (ref 135–145)
WBC # BLD: 7.5 K/UL — SIGNIFICANT CHANGE UP (ref 3.8–10.5)
WBC # FLD AUTO: 7.5 K/UL — SIGNIFICANT CHANGE UP (ref 3.8–10.5)

## 2019-01-03 PROCEDURE — 99232 SBSQ HOSP IP/OBS MODERATE 35: CPT

## 2019-01-03 RX ADMIN — PANTOPRAZOLE SODIUM 40 MILLIGRAM(S): 20 TABLET, DELAYED RELEASE ORAL at 05:58

## 2019-01-03 RX ADMIN — Medication 1 DROP(S): at 13:22

## 2019-01-03 RX ADMIN — Medication 25 MILLIGRAM(S): at 13:22

## 2019-01-03 RX ADMIN — RISPERIDONE 0.5 MILLIGRAM(S): 4 TABLET ORAL at 11:55

## 2019-01-03 RX ADMIN — Medication 1: at 17:15

## 2019-01-03 RX ADMIN — PREGABALIN 1000 MICROGRAM(S): 225 CAPSULE ORAL at 11:55

## 2019-01-03 RX ADMIN — ATORVASTATIN CALCIUM 40 MILLIGRAM(S): 80 TABLET, FILM COATED ORAL at 20:44

## 2019-01-03 RX ADMIN — Medication 1 DROP(S): at 20:43

## 2019-01-03 RX ADMIN — Medication 25 MILLIGRAM(S): at 05:58

## 2019-01-03 RX ADMIN — Medication 100 MILLIGRAM(S): at 11:55

## 2019-01-03 RX ADMIN — Medication 1: at 20:45

## 2019-01-03 RX ADMIN — Medication 25 MILLIGRAM(S): at 20:43

## 2019-01-03 RX ADMIN — ESCITALOPRAM OXALATE 20 MILLIGRAM(S): 10 TABLET, FILM COATED ORAL at 11:55

## 2019-01-03 RX ADMIN — Medication 1: at 12:33

## 2019-01-03 RX ADMIN — HEPARIN SODIUM 5000 UNIT(S): 5000 INJECTION INTRAVENOUS; SUBCUTANEOUS at 05:58

## 2019-01-03 RX ADMIN — Medication 1 DROP(S): at 05:58

## 2019-01-03 RX ADMIN — SENNA PLUS 2 TABLET(S): 8.6 TABLET ORAL at 20:44

## 2019-01-03 RX ADMIN — HEPARIN SODIUM 5000 UNIT(S): 5000 INJECTION INTRAVENOUS; SUBCUTANEOUS at 17:15

## 2019-01-03 NOTE — PHYSICAL THERAPY INITIAL EVALUATION ADULT - GENERAL OBSERVATIONS, REHAB EVAL
Consult received,EMR, radiology and labs reviewed. Patient received supine in bed, c/o of dizziness. Patient agreed to EVALUATION from Physical Therapist.

## 2019-01-03 NOTE — PROGRESS NOTE ADULT - SUBJECTIVE AND OBJECTIVE BOX
CHIEF COMPLAINT:Patient is a 78y old  Male who presents with a chief complaint of dizziness.Pt appears comfortable.    	  REVIEW OF SYSTEMS:  CONSTITUTIONAL: No fever, weight loss, or fatigue  EYES: No eye pain, visual disturbances, or discharge  ENT:  No difficulty hearing, tinnitus, vertigo; No sinus or throat pain  NECK: No pain or stiffness  RESPIRATORY: No cough, wheezing, chills or hemoptysis; No Shortness of Breath  CARDIOVASCULAR: No chest pain, palpitations, passing out, dizziness, or leg swelling  GASTROINTESTINAL: No abdominal or epigastric pain. No nausea, vomiting, or hematemesis; No diarrhea or constipation. No melena or hematochezia.  GENITOURINARY: No dysuria, frequency, hematuria, or incontinence  NEUROLOGICAL: No headaches, memory loss, loss of strength, numbness, or tremors  SKIN: No itching, burning, rashes, or lesions   LYMPH Nodes: No enlarged glands  ENDOCRINE: No heat or cold intolerance; No hair loss  MUSCULOSKELETAL: No joint pain or swelling; No muscle, back, or extremity pain  PSYCHIATRIC: No depression, anxiety, mood swings, or difficulty sleeping  HEME/LYMPH: No easy bruising, or bleeding gums  ALLERGY AND IMMUNOLOGIC: No hives or eczema	      PHYSICAL EXAM:  T(C): 36.2 (01-03-19 @ 05:16), Max: 36.8 (01-02-19 @ 16:12)  HR: 73 (01-03-19 @ 04:01) (73 - 96)  BP: 127/61 (01-03-19 @ 04:01) (124/96 - 140/82)  RR: 18 (01-03-19 @ 05:16) (16 - 18)  SpO2: 94% (01-03-19 @ 05:16) (94% - 95%)      Appearance: Normal	  HEENT:   Normal oral mucosa, PERRL, EOMI	  Lymphatic: No lymphadenopathy  Cardiovascular: Normal S1 S2, No JVD, No murmurs, No edema  Respiratory: Lungs clear to auscultation	  Psychiatry: A & O x 3, Mood & affect appropriate  Gastrointestinal:  Soft, Non-tender, + BS	  Skin: No rashes, No ecchymoses, No cyanosis	  Neurologic: Non-focal  Extremities: Normal range of motion, No clubbing, cyanosis or edema  Vascular: Peripheral pulses palpable 2+ bilaterally    MEDICATIONS  (STANDING):  artificial  tears Solution 1 Drop(s) Both EYES three times a day  atorvastatin 40 milliGRAM(s) Oral at bedtime  cyanocobalamin 1000 MICROGram(s) Oral daily  docusate sodium 100 milliGRAM(s) Oral daily  ergocalciferol 41729 Unit(s) Oral <User Schedule>  escitalopram 20 milliGRAM(s) Oral daily  heparin  Injectable 5000 Unit(s) SubCutaneous every 12 hours  insulin lispro (HumaLOG) corrective regimen sliding scale   SubCutaneous Before meals and at bedtime  meclizine 25 milliGRAM(s) Oral every 8 hours  pantoprazole    Tablet 40 milliGRAM(s) Oral before breakfast  risperiDONE   Tablet 0.5 milliGRAM(s) Oral daily  senna 2 Tablet(s) Oral at bedtime      LABS:	 	                        13.2   7.5   )-----------( 235      ( 03 Jan 2019 07:38 )             41.0     01-03    136  |  102  |  13  ----------------------------<  132<H>  3.9   |  27  |  0.99    Ca    8.2<L>      03 Jan 2019 07:38      proBNP: Serum Pro-Brain Natriuretic Peptide: 59 pg/mL (12-23 @ 23:00)    Lipid Profile: Cholesterol 200    HDL 43      HgA1c: Hemoglobin A1C, Whole Blood: 7.9 % (12-24 @ 10:48)    TSH: Thyroid Stimulating Hormone, Serum: 2.39 uU/mL (12-24 @ 04:53)      	  IMPRESSIONS:Normal Study  * Negative ECG evidence of ischemia after IV of Lexiscan.  * Review of raw data shows: The study is of good technical  quality.  * The left ventricle was normal in size. Normal myocardial  perfusion scan, with no evidence of infarction or  inducible ischemia.  * Gated wall motion analysis is performed, and shows  normal wall motion with post stress LVEF of 68%.

## 2019-01-03 NOTE — PROGRESS NOTE ADULT - SUBJECTIVE AND OBJECTIVE BOX
Patient is a 78y old  Male who presents with a chief complaint of dizziness (03 Jan 2019 12:19)    pt seen in icu [  ], reg med floor [ x  ], bed [ ], chair at bedside [  x ], a+o x3 [ x ], lethargic [  ],  nad [ x ]    pt states feeling better and stronger    Allergies    penicillin (Hives)        Vitals    T(F): 97.1 (01-03-19 @ 05:16), Max: 98.2 (01-02-19 @ 16:12)  HR: 88 (01-03-19 @ 10:47) (73 - 96)  BP: 121/79 (01-03-19 @ 10:47) (121/79 - 137/66)  RR: 18 (01-03-19 @ 05:16) (18 - 18)  SpO2: 96% (01-03-19 @ 10:47) (94% - 96%)  Wt(kg): --  CAPILLARY BLOOD GLUCOSE      POCT Blood Glucose.: 185 mg/dL (03 Jan 2019 12:08)      Labs                          13.2   7.5   )-----------( 235      ( 03 Jan 2019 07:38 )             41.0       01-03    136  |  102  |  13  ----------------------------<  132<H>  3.9   |  27  |  0.99    Ca    8.2<L>      03 Jan 2019 07:38    Acetylcholine Receptor Binding Antibody (12.26.18 @ 06:12)    ACRM Modulating Antibody: 0.00: -------------------ADDITIONAL INFORMATION-------------------  This test was developed and its performance characteristics  determined by [facility] in a manner consistent with CLIA  requirements. This test has not been cleared or approved by  the [place]. Food and Drug Administration.  Test Performed by:  [facility] - [address] [address] nmol/L              .CSF CSF  12-29 @ 00:45   Testing in progress  --  --      .Urine Clean Catch (Midstream)  12-24 @ 10:58   <10,000 CFU/ml Normal Urogenital juliane present  --  --      < from: Nuclear Stress Test-Pharmacologic (01.02.19 @ 09:38) >  IMPRESSIONS:Normal Study  * Negative ECG evidence of ischemia after IV of Lexiscan.  * Review of raw data shows: The study is of good technical  quality.  * The left ventricle was normal in size. Normal myocardial  perfusion scan, with no evidence of infarction or  inducible ischemia.  * Gated wall motion analysis is performed, and shows  normal wall motion with post stress LVEF of 68%.    < end of copied text >          Radiology Results      Meds    MEDICATIONS  (STANDING):  artificial  tears Solution 1 Drop(s) Both EYES three times a day  artificial  tears Solution 1 Drop(s) Both EYES three times a day  atorvastatin 40 milliGRAM(s) Oral at bedtime  cyanocobalamin 1000 MICROGram(s) Oral daily  docusate sodium 100 milliGRAM(s) Oral daily  ergocalciferol 11307 Unit(s) Oral <User Schedule>  escitalopram 20 milliGRAM(s) Oral daily  heparin  Injectable 5000 Unit(s) SubCutaneous every 12 hours  insulin lispro (HumaLOG) corrective regimen sliding scale   SubCutaneous Before meals and at bedtime  meclizine 25 milliGRAM(s) Oral every 8 hours  pantoprazole    Tablet 40 milliGRAM(s) Oral before breakfast  risperiDONE   Tablet 0.5 milliGRAM(s) Oral daily  senna 2 Tablet(s) Oral at bedtime      MEDICATIONS  (PRN):  acetaminophen   Tablet .. 650 milliGRAM(s) Oral every 6 hours PRN Temp greater or equal to 38C (100.4F), Mild Pain (1 - 3), Moderate Pain (4 - 6)  aluminum hydroxide/magnesium hydroxide/simethicone Suspension 30 milliLiter(s) Oral every 6 hours PRN Dyspepsia  LORazepam     Tablet 1 milliGRAM(s) Oral daily PRN Anxiety      Physical Exam      Neuro :  no focal deficits  Respiratory: CTA B/L  CV: RRR, S1S2, no murmurs,   Abdominal: Soft, NT, ND +BS,  Extremities: No edema, + peripheral pulses    ASSESSMENT    Dizziness and giddiness   r/o cns path  vasovagal episode possibly 2nd acute drug reaction,   abdominal wall fluid collection,   h/o Anxiety  Depression  HLD (hyperlipidemia)  DM (diabetes mellitus)  HTN (hypertension)  No significant past surgical history      PLAN      ct head neg  MRI brain and MRA head neg for acute infarct or aneurysm, but with partial opacification of mastoid air cells noted above  acetylcholine receptor antibody neg noted above  neuro f/u  Outpt neurology f/u for NCV testing  s/p spinal tap  f/u fluid study  cont ivig completed day 3/5, day 4 held  cont aspirin atorvastatin  cont meclizine  cont levaquin 500mg daily x 7 days for possible mastoiditis to be completed 1/4/19  cardio f/u noted  pt d/c tele  ce x2 neg noted above  stress test neg noted above  gi f/u    Epigastric burning; ? gastritis/ulcer  c/w maalox prn and PPI daily  avoid NSAID's  bowel regimen  plan for EGD/Colonoscopy once neurologically stable.   surg cons noted  phys tx eval rec rolling walker (5 inch wheels), home w/ home PT  cont current meds   d/c plan once cleared by neuro and gi

## 2019-01-03 NOTE — PROGRESS NOTE ADULT - ASSESSMENT
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  1.D/C Tele monitoring.  2.Neurology f/u.  3.S/P  IV IG.  4.DM-insulin.  5.Cont asa,statin.  6.GI and DVT prophylaxis.

## 2019-01-03 NOTE — PROGRESS NOTE ADULT - SUBJECTIVE AND OBJECTIVE BOX
PGY 1 Note discussed with supervising resident and primary attending    Patient is a 78y old  Male who presents with a chief complaint of dizziness (03 Jan 2019 09:44)      INTERVAL HPI/OVERNIGHT EVENTS: No acute events overnight, remains afebrile; HD stable, H/H stable, WBC WNL  Patient complaining about persistent pressure behind eyes and dryness and itchiness of eyes.   Gave eye drop for the eye dryness and itchiness. 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  Pt not c/o acute vision changes, N/V, HA, neurological deficits, however. Pt advised to follow up with outpatient ophthalmologist.    MEDICATIONS  (STANDING):  artificial  tears Solution 1 Drop(s) Both EYES three times a day  atorvastatin 40 milliGRAM(s) Oral at bedtime  cyanocobalamin 1000 MICROGram(s) Oral daily  docusate sodium 100 milliGRAM(s) Oral daily  ergocalciferol 26297 Unit(s) Oral <User Schedule>  escitalopram 20 milliGRAM(s) Oral daily  heparin  Injectable 5000 Unit(s) SubCutaneous every 12 hours  insulin lispro (HumaLOG) corrective regimen sliding scale   SubCutaneous Before meals and at bedtime  meclizine 25 milliGRAM(s) Oral every 8 hours  pantoprazole    Tablet 40 milliGRAM(s) Oral before breakfast  risperiDONE   Tablet 0.5 milliGRAM(s) Oral daily  senna 2 Tablet(s) Oral at bedtime    MEDICATIONS  (PRN):  acetaminophen   Tablet .. 650 milliGRAM(s) Oral every 6 hours PRN Temp greater or equal to 38C (100.4F), Mild Pain (1 - 3), Moderate Pain (4 - 6)  aluminum hydroxide/magnesium hydroxide/simethicone Suspension 30 milliLiter(s) Oral every 6 hours PRN Dyspepsia  LORazepam     Tablet 1 milliGRAM(s) Oral daily PRN Anxiety      __________________________________________________  REVIEW OF SYSTEMS:    CONSTITUTIONAL: No fever, weight loss. Dizziness, improving.  RESPIRATORY: No cough, wheezing, chills or hemoptysis; No shortness of breath  CARDIOVASCULAR: No chest pain, palpitations,  or leg swelling  GASTROINTESTINAL: Burning like abdominal pain  NEUROLOGICAL: No headaches, memory loss, loss of strength, numbness, or tremors  SKIN: No itching, burning, rashes, or lesions       Vital Signs Last 24 Hrs  T(C): 36.2 (03 Jan 2019 05:16), Max: 36.8 (02 Jan 2019 16:12)  T(F): 97.1 (03 Jan 2019 05:16), Max: 98.2 (02 Jan 2019 16:12)  HR: 88 (03 Jan 2019 10:47) (73 - 96)  BP: 121/79 (03 Jan 2019 10:47) (121/79 - 137/66)  BP(mean): --  RR: 18 (03 Jan 2019 05:16) (18 - 18)  SpO2: 96% (03 Jan 2019 10:47) (94% - 96%)    ________________________________________________  PHYSICAL EXAM:    GENERAL: NAD, well-groomed, well-developed  HEAD:  Atraumatic, Normocephalic  EYES: EOMI, PERRLA, conjunctiva and sclera clear  NECK: Supple, No JVD, Normal thyroid  CHEST/LUNG: Clear to percussion bilaterally; No rales, rhonchi, wheezing, or rubs  HEART: Regular rate and rhythm; No murmurs, rubs, or gallops  ABDOMEN: Soft, diffusely tender, distended; Bowel sounds present  NERVOUS SYSTEM:  Alert & Oriented X3, Good concentration; Motor Strength 3/5 B/LE   EXTREMITIES:  2+ Peripheral Pulses, No clubbing, cyanosis, or edema    _________________________________________________  LABS:                        13.2   7.5   )-----------( 235      ( 03 Jan 2019 07:38 )             41.0     01-03    136  |  102  |  13  ----------------------------<  132<H>  3.9   |  27  |  0.99    Ca    8.2<L>      03 Jan 2019 07:38          CAPILLARY BLOOD GLUCOSE      POCT Blood Glucose.: 185 mg/dL (03 Jan 2019 12:08)  POCT Blood Glucose.: 140 mg/dL (03 Jan 2019 07:41)  POCT Blood Glucose.: 158 mg/dL (02 Jan 2019 21:22)  POCT Blood Glucose.: 150 mg/dL (02 Jan 2019 16:21)        RADIOLOGY & ADDITIONAL TESTS:    Imaging Personally Reviewed:  YES    Consultant(s) Notes Reviewed:   YES    Care Discussed with Consultants :     Plan of care was discussed with patient and /or primary care giver; all questions and concerns were addressed and care was aligned with patient's wishes.

## 2019-01-03 NOTE — PROGRESS NOTE ADULT - ASSESSMENT
Still symptomatic     We discussed EGD and colonoscopy he is agreeable. I would like medical clearance that he is stable for EGD and to take th prep for the colonoscopy IF he is cleared we can plan for EGD and colon on Monday Prep on Sunday.

## 2019-01-03 NOTE — PROGRESS NOTE ADULT - PROBLEM SELECTOR PLAN 2
Patient p/w dizziness, diaphoresis, nausea and face flushing after taking Cefuroxime. Symptoms most likely due to allergic reaction. ECG: NSR VR@92bpm T1: negative. CT brain: no acute pathology. MRI/MRA: neg for acute infarct or aneurysm but chronic microvascular ischemic changes; Attenuation distal left posterior cerebral artery. Possible mastoiditis. Echo: grossly normal LV function, mild aortic stenosis, grade 1 DD. 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  -Neuro Dr Suero recs paraneoplastic antibody profile; spinal tap.   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 Outpt NCV after discharge to differentiate between the two  -F/u acetylcholine receptor antibody to eval for myasthenia gravis  -F/u CSF studies  -Pt reported CP, SOB, itchiness during IVIG 2/5 treatment on 12/30  -Pt tolerated IVIG injection 12/31 with premedications suggested by Dr. Forrest  -Patient has completed 3/5 administrations  -Holding IVIG for now due to SOB and CP with it  -PT ENRIQUE, EOMI, and diplopia resolved  -PT recs discharge to home with home PT  -Patient to f/u with Dr. Suero as an outpatient for NCV and EMG testings

## 2019-01-03 NOTE — PROGRESS NOTE ADULT - SUBJECTIVE AND OBJECTIVE BOX
Subjective:   Still dizzy nad weak. + Substernal burning and abdominal pain.     Objective:    MEDICATIONS  (STANDING):  artificial  tears Solution 1 Drop(s) Both EYES three times a day  artificial  tears Solution 1 Drop(s) Both EYES three times a day  atorvastatin 40 milliGRAM(s) Oral at bedtime  cyanocobalamin 1000 MICROGram(s) Oral daily  docusate sodium 100 milliGRAM(s) Oral daily  ergocalciferol 43683 Unit(s) Oral <User Schedule>  escitalopram 20 milliGRAM(s) Oral daily  heparin  Injectable 5000 Unit(s) SubCutaneous every 12 hours  insulin lispro (HumaLOG) corrective regimen sliding scale   SubCutaneous Before meals and at bedtime  meclizine 25 milliGRAM(s) Oral every 8 hours  pantoprazole    Tablet 40 milliGRAM(s) Oral before breakfast  risperiDONE   Tablet 0.5 milliGRAM(s) Oral daily  senna 2 Tablet(s) Oral at bedtime    MEDICATIONS  (PRN):  acetaminophen   Tablet .. 650 milliGRAM(s) Oral every 6 hours PRN Temp greater or equal to 38C (100.4F), Mild Pain (1 - 3), Moderate Pain (4 - 6)  aluminum hydroxide/magnesium hydroxide/simethicone Suspension 30 milliLiter(s) Oral every 6 hours PRN Dyspepsia  LORazepam     Tablet 1 milliGRAM(s) Oral daily PRN Anxiety              Vital Signs Last 24 Hrs  T(C): 36.4 (03 Jan 2019 14:15), Max: 36.5 (02 Jan 2019 18:08)  T(F): 97.6 (03 Jan 2019 14:15), Max: 97.7 (02 Jan 2019 18:08)  HR: 87 (03 Jan 2019 14:15) (73 - 96)  BP: 116/65 (03 Jan 2019 14:15) (116/65 - 129/87)  BP(mean): --  RR: 16 (03 Jan 2019 14:15) (16 - 18)  SpO2: 94% (03 Jan 2019 14:15) (94% - 96%)      General:  NAD.  HEENT:    Normal appearance of conjunctiva, ears, nose, lips, oropharynx, and oral mucosa, anicteric.  Neck:  No masses, no asymmetry.  Lymph Nodes:  No lymphadenopathy.   Cardiovascular:  RRR normal S1/S2, no murmur.  Respiratory:  CTA B/L, normal respiratory effort.   Abdominal:   soft, no masses or tenderness, normoactive BS, NT/ND, no HSM.  Extremities:   No clubbing or cyanosis, normal capillary refill, no edema.     Other:       LABS:                        13.2   7.5   )-----------( 235      ( 03 Jan 2019 07:38 )             41.0     01-03    136  |  102  |  13  ----------------------------<  132<H>  3.9   |  27  |  0.99    Ca    8.2<L>      03 Jan 2019 07:38            RADIOLOGY & ADDITIONAL TESTS:

## 2019-01-03 NOTE — PROGRESS NOTE ADULT - PROBLEM SELECTOR PLAN 1
-Patient is persistently c/o chest pain since IVIG administration, although the cardiac enzymes and EKG is normal  -Stress test 1/2/19 AM -ve  -IVIG administration stopped

## 2019-01-04 LAB
AMPHIPHYSIN AB TITR CSF: NEGATIVE TITER — SIGNIFICANT CHANGE UP
ANION GAP SERPL CALC-SCNC: 8 MMOL/L — SIGNIFICANT CHANGE UP (ref 5–17)
BUN SERPL-MCNC: 16 MG/DL — SIGNIFICANT CHANGE UP (ref 7–18)
CALCIUM SERPL-MCNC: 8.2 MG/DL — LOW (ref 8.4–10.5)
CHLORIDE SERPL-SCNC: 103 MMOL/L — SIGNIFICANT CHANGE UP (ref 96–108)
CO2 SERPL-SCNC: 26 MMOL/L — SIGNIFICANT CHANGE UP (ref 22–31)
CREAT SERPL-MCNC: 0.89 MG/DL — SIGNIFICANT CHANGE UP (ref 0.5–1.3)
CV2 IGG TITR CSF: NEGATIVE TITER — SIGNIFICANT CHANGE UP
GLIAL NUC TYPE 1 AB TITR CSF: NEGATIVE TITER — SIGNIFICANT CHANGE UP
GLUCOSE BLDC GLUCOMTR-MCNC: 137 MG/DL — HIGH (ref 70–99)
GLUCOSE BLDC GLUCOMTR-MCNC: 142 MG/DL — HIGH (ref 70–99)
GLUCOSE BLDC GLUCOMTR-MCNC: 161 MG/DL — HIGH (ref 70–99)
GLUCOSE BLDC GLUCOMTR-MCNC: 164 MG/DL — HIGH (ref 70–99)
GLUCOSE SERPL-MCNC: 133 MG/DL — HIGH (ref 70–99)
HCT VFR BLD CALC: 41.3 % — SIGNIFICANT CHANGE UP (ref 39–50)
HGB BLD-MCNC: 13.6 G/DL — SIGNIFICANT CHANGE UP (ref 13–17)
HU1 AB TITR CSF IF: NEGATIVE TITER — SIGNIFICANT CHANGE UP
HU2 AB TITR CSF IF: NEGATIVE TITER — SIGNIFICANT CHANGE UP
HU3 AB TITR CSF: NEGATIVE TITER — SIGNIFICANT CHANGE UP
MCHC RBC-ENTMCNC: 31.1 PG — SIGNIFICANT CHANGE UP (ref 27–34)
MCHC RBC-ENTMCNC: 32.8 GM/DL — SIGNIFICANT CHANGE UP (ref 32–36)
MCV RBC AUTO: 94.6 FL — SIGNIFICANT CHANGE UP (ref 80–100)
PARANEOPLASTIC INTERPRETATION, CSF: SIGNIFICANT CHANGE UP
PCA-TR AB TITR CSF: NEGATIVE TITER — SIGNIFICANT CHANGE UP
PLATELET # BLD AUTO: 236 K/UL — SIGNIFICANT CHANGE UP (ref 150–400)
POTASSIUM SERPL-MCNC: 3.7 MMOL/L — SIGNIFICANT CHANGE UP (ref 3.5–5.3)
POTASSIUM SERPL-SCNC: 3.7 MMOL/L — SIGNIFICANT CHANGE UP (ref 3.5–5.3)
PURKINJE CELL CYTOPLASMIC AB TYPE 2: NEGATIVE TITER — SIGNIFICANT CHANGE UP
PURKINJE CELLS AB TITR CSF IF: NEGATIVE TITER — SIGNIFICANT CHANGE UP
RBC # BLD: 4.37 M/UL — SIGNIFICANT CHANGE UP (ref 4.2–5.8)
RBC # FLD: 12.2 % — SIGNIFICANT CHANGE UP (ref 10.3–14.5)
REFLEX ADDED: SIGNIFICANT CHANGE UP
SODIUM SERPL-SCNC: 137 MMOL/L — SIGNIFICANT CHANGE UP (ref 135–145)
WBC # BLD: 6.9 K/UL — SIGNIFICANT CHANGE UP (ref 3.8–10.5)
WBC # FLD AUTO: 6.9 K/UL — SIGNIFICANT CHANGE UP (ref 3.8–10.5)

## 2019-01-04 RX ADMIN — Medication 1 DROP(S): at 14:29

## 2019-01-04 RX ADMIN — Medication 650 MILLIGRAM(S): at 06:04

## 2019-01-04 RX ADMIN — Medication 1: at 17:35

## 2019-01-04 RX ADMIN — Medication 1 DROP(S): at 22:08

## 2019-01-04 RX ADMIN — ESCITALOPRAM OXALATE 20 MILLIGRAM(S): 10 TABLET, FILM COATED ORAL at 14:27

## 2019-01-04 RX ADMIN — Medication 100 MILLIGRAM(S): at 14:29

## 2019-01-04 RX ADMIN — Medication 1 DROP(S): at 06:04

## 2019-01-04 RX ADMIN — Medication 25 MILLIGRAM(S): at 14:28

## 2019-01-04 RX ADMIN — PANTOPRAZOLE SODIUM 40 MILLIGRAM(S): 20 TABLET, DELAYED RELEASE ORAL at 06:04

## 2019-01-04 RX ADMIN — Medication 25 MILLIGRAM(S): at 22:08

## 2019-01-04 RX ADMIN — RISPERIDONE 0.5 MILLIGRAM(S): 4 TABLET ORAL at 14:28

## 2019-01-04 RX ADMIN — ATORVASTATIN CALCIUM 40 MILLIGRAM(S): 80 TABLET, FILM COATED ORAL at 22:08

## 2019-01-04 RX ADMIN — Medication 25 MILLIGRAM(S): at 06:04

## 2019-01-04 RX ADMIN — HEPARIN SODIUM 5000 UNIT(S): 5000 INJECTION INTRAVENOUS; SUBCUTANEOUS at 06:04

## 2019-01-04 RX ADMIN — PREGABALIN 1000 MICROGRAM(S): 225 CAPSULE ORAL at 14:28

## 2019-01-04 RX ADMIN — HEPARIN SODIUM 5000 UNIT(S): 5000 INJECTION INTRAVENOUS; SUBCUTANEOUS at 17:36

## 2019-01-04 RX ADMIN — Medication 1: at 22:09

## 2019-01-04 NOTE — PROGRESS NOTE ADULT - SUBJECTIVE AND OBJECTIVE BOX
CHIEF COMPLAINT:Patient is a 78y old  Male who presents with a chief complaint of dizziness.Pt appears comfortable.    	  REVIEW OF SYSTEMS:  CONSTITUTIONAL: No fever, weight loss, or fatigue  EYES: No eye pain, visual disturbances, or discharge  ENT:  No difficulty hearing, tinnitus, vertigo; No sinus or throat pain  NECK: No pain or stiffness  RESPIRATORY: No cough, wheezing, chills or hemoptysis; No Shortness of Breath  CARDIOVASCULAR: No chest pain, palpitations, passing out, dizziness, or leg swelling  GASTROINTESTINAL: No abdominal or epigastric pain. No nausea, vomiting, or hematemesis; No diarrhea or constipation. No melena or hematochezia.  GENITOURINARY: No dysuria, frequency, hematuria, or incontinence  NEUROLOGICAL: No headaches, memory loss, loss of strength, numbness, or tremors  SKIN: No itching, burning, rashes, or lesions   LYMPH Nodes: No enlarged glands  ENDOCRINE: No heat or cold intolerance; No hair loss  MUSCULOSKELETAL: No joint pain or swelling; No muscle, back, or extremity pain  PSYCHIATRIC: No depression, anxiety, mood swings, or difficulty sleeping  HEME/LYMPH: No easy bruising, or bleeding gums  ALLERGY AND IMMUNOLOGIC: No hives or eczema	      PHYSICAL EXAM:  T(C): 36.6 (01-04-19 @ 05:38), Max: 36.8 (01-04-19 @ 02:07)  HR: 81 (01-04-19 @ 02:07) (79 - 88)  BP: 119/68 (01-04-19 @ 02:07) (116/65 - 122/70)  RR: 18 (01-04-19 @ 02:07) (16 - 18)  SpO2: 95% (01-04-19 @ 02:07) (94% - 96%)    I&O's Summary    03 Jan 2019 07:01  -  04 Jan 2019 07:00  --------------------------------------------------------  IN: 0 mL / OUT: 300 mL / NET: -300 mL        Appearance: Normal	  HEENT:   Normal oral mucosa, PERRL, EOMI	  Lymphatic: No lymphadenopathy  Cardiovascular: Normal S1 S2, No JVD, No murmurs, No edema  Respiratory: Lungs clear to auscultation	  Psychiatry: A & O x 3, Mood & affect appropriate  Gastrointestinal:  Soft, Non-tender, + BS	  Skin: No rashes, No ecchymoses, No cyanosis	  Neurologic: Non-focal  Extremities: Normal range of motion, No clubbing, cyanosis or edema  Vascular: Peripheral pulses palpable 2+ bilaterally    MEDICATIONS  (STANDING):  artificial  tears Solution 1 Drop(s) Both EYES three times a day  artificial  tears Solution 1 Drop(s) Both EYES three times a day  atorvastatin 40 milliGRAM(s) Oral at bedtime  cyanocobalamin 1000 MICROGram(s) Oral daily  docusate sodium 100 milliGRAM(s) Oral daily  ergocalciferol 96819 Unit(s) Oral <User Schedule>  escitalopram 20 milliGRAM(s) Oral daily  heparin  Injectable 5000 Unit(s) SubCutaneous every 12 hours  insulin lispro (HumaLOG) corrective regimen sliding scale   SubCutaneous Before meals and at bedtime  meclizine 25 milliGRAM(s) Oral every 8 hours  pantoprazole    Tablet 40 milliGRAM(s) Oral before breakfast  risperiDONE   Tablet 0.5 milliGRAM(s) Oral daily  senna 2 Tablet(s) Oral at bedtime      	  LABS:	 	                      13.2   7.5   )-----------( 235      ( 03 Jan 2019 07:38 )             41.0     01-03    136  |  102  |  13  ----------------------------<  132<H>  3.9   |  27  |  0.99    Ca    8.2<L>      03 Jan 2019 07:38      proBNP: Serum Pro-Brain Natriuretic Peptide: 59 pg/mL (12-23 @ 23:00)    Lipid Profile: Cholesterol 200    HDL 43      HgA1c: Hemoglobin A1C, Whole Blood: 7.9 % (12-24 @ 10:48)    TSH: Thyroid Stimulating Hormone, Serum: 2.39 uU/mL (12-24 @ 04:53)

## 2019-01-04 NOTE — PROGRESS NOTE ADULT - ASSESSMENT
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  1.Neurology f/u.  2.S/P  IV IG.  3.DM-insulin.  4.Cont asa,statin.  5.GI and DVT prophylaxis.

## 2019-01-04 NOTE — PROGRESS NOTE ADULT - SUBJECTIVE AND OBJECTIVE BOX
Patient is a 78y old  Male who presents with a chief complaint of dizziness (04 Jan 2019 10:20)    pt seen in icu [  ], reg med floor [   ], bed [  ], chair at bedside [   ], a+o x3 [  ], lethargic [  ],  nad [  ]    olson [  ], ngt [  ], peg [  ], et tube [  ], cent line [  ], picc line [  ]        Allergies    penicillin (Hives)        Vitals    T(F): 97.3 (01-04-19 @ 11:45), Max: 98.3 (01-04-19 @ 02:07)  HR: 81 (01-04-19 @ 02:07) (79 - 87)  BP: 119/68 (01-04-19 @ 02:07) (116/65 - 122/70)  RR: 17 (01-04-19 @ 11:45) (16 - 18)  SpO2: 97% (01-04-19 @ 11:45) (94% - 97%)  Wt(kg): --  CAPILLARY BLOOD GLUCOSE      POCT Blood Glucose.: 137 mg/dL (04 Jan 2019 12:14)      Labs                          13.6   6.9   )-----------( 236      ( 04 Jan 2019 13:08 )             41.3       01-03    136  |  102  |  13  ----------------------------<  132<H>  3.9   |  27  |  0.99    Ca    8.2<L>      03 Jan 2019 07:38              .CSF CSF  12-29 @ 00:45   Testing in progress  --  --      .Urine Clean Catch (Midstream)  12-24 @ 10:58   <10,000 CFU/ml Normal Urogenital juliane present  --  --          Radiology Results      Meds    MEDICATIONS  (STANDING):  artificial  tears Solution 1 Drop(s) Both EYES three times a day  artificial  tears Solution 1 Drop(s) Both EYES three times a day  atorvastatin 40 milliGRAM(s) Oral at bedtime  cyanocobalamin 1000 MICROGram(s) Oral daily  docusate sodium 100 milliGRAM(s) Oral daily  ergocalciferol 74875 Unit(s) Oral <User Schedule>  escitalopram 20 milliGRAM(s) Oral daily  heparin  Injectable 5000 Unit(s) SubCutaneous every 12 hours  insulin lispro (HumaLOG) corrective regimen sliding scale   SubCutaneous Before meals and at bedtime  meclizine 25 milliGRAM(s) Oral every 8 hours  pantoprazole    Tablet 40 milliGRAM(s) Oral before breakfast  risperiDONE   Tablet 0.5 milliGRAM(s) Oral daily  senna 2 Tablet(s) Oral at bedtime      MEDICATIONS  (PRN):  acetaminophen   Tablet .. 650 milliGRAM(s) Oral every 6 hours PRN Temp greater or equal to 38C (100.4F), Mild Pain (1 - 3), Moderate Pain (4 - 6)  aluminum hydroxide/magnesium hydroxide/simethicone Suspension 30 milliLiter(s) Oral every 6 hours PRN Dyspepsia  LORazepam     Tablet 1 milliGRAM(s) Oral daily PRN Anxiety      Physical Exam    Neuro :  no focal deficits  Respiratory: CTA B/L  CV: RRR, S1S2, no murmurs,   Abdominal: Soft, NT, ND +BS,  Extremities: No edema, + peripheral pulses    ASSESSMENT    Dizziness and giddiness  Anxiety  Depression  HLD (hyperlipidemia)  DM (diabetes mellitus)  HTN (hypertension)  No significant past surgical history      PLAN    cARDIO FOR CLR  EGD COLON ONDAY Patient is a 78y old  Male who presents with a chief complaint of dizziness (04 Jan 2019 10:20)    pt seen in icu [  ], reg med floor [ x  ], bed [ ], chair at bedside [  x ], a+o x3 [ x ], lethargic [  ],  nad [ x ]      Allergies    penicillin (Hives)        Vitals    T(F): 97.3 (01-04-19 @ 11:45), Max: 98.3 (01-04-19 @ 02:07)  HR: 81 (01-04-19 @ 02:07) (79 - 87)  BP: 119/68 (01-04-19 @ 02:07) (116/65 - 122/70)  RR: 17 (01-04-19 @ 11:45) (16 - 18)  SpO2: 97% (01-04-19 @ 11:45) (94% - 97%)  Wt(kg): --  CAPILLARY BLOOD GLUCOSE      POCT Blood Glucose.: 137 mg/dL (04 Jan 2019 12:14)      Labs                          13.6   6.9   )-----------( 236      ( 04 Jan 2019 13:08 )             41.3       01-03    136  |  102  |  13  ----------------------------<  132<H>  3.9   |  27  |  0.99    Ca    8.2<L>      03 Jan 2019 07:38              .CSF CSF  12-29 @ 00:45   Testing in progress  --  --      .Urine Clean Catch (Midstream)  12-24 @ 10:58   <10,000 CFU/ml Normal Urogenital juliane present  --  --          Radiology Results      Meds    MEDICATIONS  (STANDING):  artificial  tears Solution 1 Drop(s) Both EYES three times a day  artificial  tears Solution 1 Drop(s) Both EYES three times a day  atorvastatin 40 milliGRAM(s) Oral at bedtime  cyanocobalamin 1000 MICROGram(s) Oral daily  docusate sodium 100 milliGRAM(s) Oral daily  ergocalciferol 35312 Unit(s) Oral <User Schedule>  escitalopram 20 milliGRAM(s) Oral daily  heparin  Injectable 5000 Unit(s) SubCutaneous every 12 hours  insulin lispro (HumaLOG) corrective regimen sliding scale   SubCutaneous Before meals and at bedtime  meclizine 25 milliGRAM(s) Oral every 8 hours  pantoprazole    Tablet 40 milliGRAM(s) Oral before breakfast  risperiDONE   Tablet 0.5 milliGRAM(s) Oral daily  senna 2 Tablet(s) Oral at bedtime      MEDICATIONS  (PRN):  acetaminophen   Tablet .. 650 milliGRAM(s) Oral every 6 hours PRN Temp greater or equal to 38C (100.4F), Mild Pain (1 - 3), Moderate Pain (4 - 6)  aluminum hydroxide/magnesium hydroxide/simethicone Suspension 30 milliLiter(s) Oral every 6 hours PRN Dyspepsia  LORazepam     Tablet 1 milliGRAM(s) Oral daily PRN Anxiety      Physical Exam      Neuro :  no focal deficits  Respiratory: CTA B/L  CV: RRR, S1S2, no murmurs,   Abdominal: Soft, NT, ND +BS,  Extremities: No edema, + peripheral pulses    ASSESSMENT    Dizziness and giddiness   r/o cns path  vasovagal episode possibly 2nd acute drug reaction,   abdominal wall fluid collection,   h/o Anxiety  Depression  HLD (hyperlipidemia)  DM (diabetes mellitus)  HTN (hypertension)  No significant past surgical history      PLAN      ct head neg  MRI brain and MRA head neg for acute infarct or aneurysm, but with partial opacification of mastoid air cells noted above  acetylcholine receptor antibody neg noted above  neuro f/u  Outpt neurology f/u for NCV testing  s/p spinal tap  f/u fluid study  cont ivig completed day 3/5, day 4 held  cont aspirin atorvastatin  cont meclizine  cont levaquin 500mg daily x 7 days for possible mastoiditis to be completed 1/4/19  cardio f/u noted  pt d/c tele  ce x2 neg noted above  stress test neg noted above  gi f/u    Epigastric burning; ? gastritis/ulcer  c/w maalox prn and PPI daily  avoid NSAID's  bowel regimen  plan for EGD/Colonoscopy on monday pending medical clearance  surg cons noted  phys tx eval rec rolling walker (5 inch wheels), home w/ home PT  cont current meds

## 2019-01-04 NOTE — PROGRESS NOTE ADULT - PROBLEM SELECTOR PLAN 1
Patient w/ Hx of hernia repair 10yrs ago, denies abdominal pain. CT A/P: 4.7 x 0.7 x 4.0 cm thick-walled fluid collection within the anterior. Abdominal and an umbilical hernia repair site. No significant surrounding inflammation.  -Surgery consulted: recs med management  -Patient c/o burning abdominal pain  -c/w maalox and ppi for now for symptomatically management  -GI Dr. Pierre planning for EGD/Colonoscopy on upcoming Monday

## 2019-01-04 NOTE — PROGRESS NOTE ADULT - PROBLEM SELECTOR PLAN 2
Patient p/w dizziness, diaphoresis, nausea and face flushing after taking Cefuroxime. Symptoms most likely due to allergic reaction. ECG: NSR VR@92bpm T1: negative. CT brain: no acute pathology. MRI/MRA: neg for acute infarct or aneurysm but chronic microvascular ischemic changes; Attenuation distal left posterior cerebral artery. Possible mastoiditis. Echo: grossly normal LV function, mild aortic stenosis, grade 1 DD. 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 Pt reported CP, SOB, itchiness during IVIG 2/5 treatment on 12/30. Pt tolerated IVIG injection 12/31 with premedications suggested by Dr. Forrest. Patient has completed 3/5 administrations. Holding IVIG for now due to SOB and CP with it,  -Neuro Dr Suero recs paraneoplastic antibody profile; spinal tap.   -Cardio Dr Mcdowell: tele, orthostatics q shift, lisinopril discontinued  -C/w Levaquin 500mg daily x 7 days for possible mastoiditis completed today 1/4/19  -Pt ENRIQUE, EOMI, and diplopia resolved  -Outpt NCV after discharge to differentiate between AIDP variant vs MG  -F/u acetylcholine receptor antibody to eval for myasthenia gravis  -F/u CSF studies  -PT recs discharge to home with home PT  -Patient to f/u with Dr. Suero as an outpatient for NCV and EMG testings

## 2019-01-04 NOTE — PROGRESS NOTE ADULT - SUBJECTIVE AND OBJECTIVE BOX
PGY 1 Note discussed with supervising resident and primary attending    Patient is a 78y old  Male who presents with a chief complaint of dizziness (04 Jan 2019 13:19)      INTERVAL HPI/OVERNIGHT EVENTS: No acute events overnight, remains afebrile; HD stable, H/H stable, WBC WNL  Pt clinically stable. Still c/o mild pain on the back of head, pressure behind eyes (chronic), and dizziness (since admission).  Dizziness, EOM notably improved since admission after IVIG treatment (3/5 doses completed).   Dryness of eyes improved with eye drops.  Pressure behind eyes and tightness in the back of head improving. Will continue to monitor.    MEDICATIONS  (STANDING):  artificial  tears Solution 1 Drop(s) Both EYES three times a day  artificial  tears Solution 1 Drop(s) Both EYES three times a day  atorvastatin 40 milliGRAM(s) Oral at bedtime  cyanocobalamin 1000 MICROGram(s) Oral daily  docusate sodium 100 milliGRAM(s) Oral daily  ergocalciferol 98086 Unit(s) Oral <User Schedule>  escitalopram 20 milliGRAM(s) Oral daily  heparin  Injectable 5000 Unit(s) SubCutaneous every 12 hours  insulin lispro (HumaLOG) corrective regimen sliding scale   SubCutaneous Before meals and at bedtime  meclizine 25 milliGRAM(s) Oral every 8 hours  pantoprazole    Tablet 40 milliGRAM(s) Oral before breakfast  risperiDONE   Tablet 0.5 milliGRAM(s) Oral daily  senna 2 Tablet(s) Oral at bedtime    MEDICATIONS  (PRN):  acetaminophen   Tablet .. 650 milliGRAM(s) Oral every 6 hours PRN Temp greater or equal to 38C (100.4F), Mild Pain (1 - 3), Moderate Pain (4 - 6)  aluminum hydroxide/magnesium hydroxide/simethicone Suspension 30 milliLiter(s) Oral every 6 hours PRN Dyspepsia  LORazepam     Tablet 1 milliGRAM(s) Oral daily PRN Anxiety      __________________________________________________  REVIEW OF SYSTEMS:    CONSTITUTIONAL: No fever, weight loss. Dizziness, improving.  RESPIRATORY: No cough, wheezing, chills or hemoptysis; No shortness of breath  CARDIOVASCULAR: No chest pain, palpitations,  or leg swelling  GASTROINTESTINAL: Burning like abdominal pain  NEUROLOGICAL: No headaches, memory loss, loss of strength, numbness, or tremors  SKIN: No itching, burning, rashes, or lesions         Vital Signs Last 24 Hrs  T(C): 36.4 (04 Jan 2019 14:09), Max: 36.8 (04 Jan 2019 02:07)  T(F): 97.6 (04 Jan 2019 14:09), Max: 98.3 (04 Jan 2019 02:07)  HR: 76 (04 Jan 2019 14:09) (76 - 83)  BP: 123/60 (04 Jan 2019 14:09) (119/68 - 123/60)  BP(mean): --  RR: 18 (04 Jan 2019 14:09) (17 - 18)  SpO2: 95% (04 Jan 2019 14:09) (95% - 97%)    ________________________________________________  PHYSICAL EXAM:    GENERAL: NAD, well-groomed, well-developed  HEAD:  Atraumatic, Normocephalic  EYES: EOMI, PERRLA, conjunctiva and sclera clear  NECK: Supple, No JVD, Normal thyroid  CHEST/LUNG: Clear to percussion bilaterally; No rales, rhonchi, wheezing, or rubs  HEART: Regular rate and rhythm; No murmurs, rubs, or gallops  ABDOMEN: Soft, diffusely tender, distended; Bowel sounds present  NERVOUS SYSTEM:  Alert & Oriented X3, Good concentration; Motor Strength 3/5 B/LE   EXTREMITIES:  2+ Peripheral Pulses, No clubbing, cyanosis, or edema    _________________________________________________  LABS:                        13.6   6.9   )-----------( 236      ( 04 Jan 2019 13:08 )             41.3     01-04    137  |  103  |  16  ----------------------------<  133<H>  3.7   |  26  |  0.89    Ca    8.2<L>      04 Jan 2019 13:08          CAPILLARY BLOOD GLUCOSE      POCT Blood Glucose.: 137 mg/dL (04 Jan 2019 12:14)  POCT Blood Glucose.: 142 mg/dL (04 Jan 2019 08:25)  POCT Blood Glucose.: 192 mg/dL (03 Jan 2019 20:42)  POCT Blood Glucose.: 172 mg/dL (03 Jan 2019 17:12)        RADIOLOGY & ADDITIONAL TESTS:    Imaging Personally Reviewed:  YES    Consultant(s) Notes Reviewed:   YES    Care Discussed with Consultants :     Plan of care was discussed with patient and /or primary care giver; all questions and concerns were addressed and care was aligned with patient's wishes.

## 2019-01-05 LAB
ANION GAP SERPL CALC-SCNC: 8 MMOL/L — SIGNIFICANT CHANGE UP (ref 5–17)
BUN SERPL-MCNC: 17 MG/DL — SIGNIFICANT CHANGE UP (ref 7–18)
CALCIUM SERPL-MCNC: 8.1 MG/DL — LOW (ref 8.4–10.5)
CHLORIDE SERPL-SCNC: 105 MMOL/L — SIGNIFICANT CHANGE UP (ref 96–108)
CO2 SERPL-SCNC: 27 MMOL/L — SIGNIFICANT CHANGE UP (ref 22–31)
CREAT SERPL-MCNC: 0.87 MG/DL — SIGNIFICANT CHANGE UP (ref 0.5–1.3)
GLUCOSE BLDC GLUCOMTR-MCNC: 116 MG/DL — HIGH (ref 70–99)
GLUCOSE BLDC GLUCOMTR-MCNC: 136 MG/DL — HIGH (ref 70–99)
GLUCOSE BLDC GLUCOMTR-MCNC: 222 MG/DL — HIGH (ref 70–99)
GLUCOSE BLDC GLUCOMTR-MCNC: 227 MG/DL — HIGH (ref 70–99)
GLUCOSE SERPL-MCNC: 139 MG/DL — HIGH (ref 70–99)
HCT VFR BLD CALC: 38 % — LOW (ref 39–50)
HGB BLD-MCNC: 12.6 G/DL — LOW (ref 13–17)
MCHC RBC-ENTMCNC: 31.7 PG — SIGNIFICANT CHANGE UP (ref 27–34)
MCHC RBC-ENTMCNC: 33.2 GM/DL — SIGNIFICANT CHANGE UP (ref 32–36)
MCV RBC AUTO: 95.4 FL — SIGNIFICANT CHANGE UP (ref 80–100)
PLATELET # BLD AUTO: 209 K/UL — SIGNIFICANT CHANGE UP (ref 150–400)
POTASSIUM SERPL-MCNC: 3.8 MMOL/L — SIGNIFICANT CHANGE UP (ref 3.5–5.3)
POTASSIUM SERPL-SCNC: 3.8 MMOL/L — SIGNIFICANT CHANGE UP (ref 3.5–5.3)
RBC # BLD: 3.98 M/UL — LOW (ref 4.2–5.8)
RBC # FLD: 12.2 % — SIGNIFICANT CHANGE UP (ref 10.3–14.5)
SODIUM SERPL-SCNC: 140 MMOL/L — SIGNIFICANT CHANGE UP (ref 135–145)
WBC # BLD: 7.5 K/UL — SIGNIFICANT CHANGE UP (ref 3.8–10.5)
WBC # FLD AUTO: 7.5 K/UL — SIGNIFICANT CHANGE UP (ref 3.8–10.5)

## 2019-01-05 RX ADMIN — Medication 1 DROP(S): at 06:20

## 2019-01-05 RX ADMIN — Medication 25 MILLIGRAM(S): at 06:20

## 2019-01-05 RX ADMIN — Medication 1 DROP(S): at 14:15

## 2019-01-05 RX ADMIN — SENNA PLUS 2 TABLET(S): 8.6 TABLET ORAL at 21:59

## 2019-01-05 RX ADMIN — Medication 25 MILLIGRAM(S): at 14:15

## 2019-01-05 RX ADMIN — ESCITALOPRAM OXALATE 20 MILLIGRAM(S): 10 TABLET, FILM COATED ORAL at 12:29

## 2019-01-05 RX ADMIN — Medication 1 DROP(S): at 21:58

## 2019-01-05 RX ADMIN — Medication 1 DROP(S): at 14:00

## 2019-01-05 RX ADMIN — PANTOPRAZOLE SODIUM 40 MILLIGRAM(S): 20 TABLET, DELAYED RELEASE ORAL at 06:20

## 2019-01-05 RX ADMIN — HEPARIN SODIUM 5000 UNIT(S): 5000 INJECTION INTRAVENOUS; SUBCUTANEOUS at 06:21

## 2019-01-05 RX ADMIN — HEPARIN SODIUM 5000 UNIT(S): 5000 INJECTION INTRAVENOUS; SUBCUTANEOUS at 18:29

## 2019-01-05 RX ADMIN — ATORVASTATIN CALCIUM 40 MILLIGRAM(S): 80 TABLET, FILM COATED ORAL at 21:59

## 2019-01-05 RX ADMIN — Medication 100 MILLIGRAM(S): at 12:29

## 2019-01-05 RX ADMIN — RISPERIDONE 0.5 MILLIGRAM(S): 4 TABLET ORAL at 12:29

## 2019-01-05 RX ADMIN — Medication 2: at 12:29

## 2019-01-05 RX ADMIN — PREGABALIN 1000 MICROGRAM(S): 225 CAPSULE ORAL at 12:29

## 2019-01-05 RX ADMIN — Medication 2: at 21:59

## 2019-01-05 RX ADMIN — Medication 25 MILLIGRAM(S): at 21:59

## 2019-01-05 NOTE — PROGRESS NOTE ADULT - SUBJECTIVE AND OBJECTIVE BOX
CHIEF COMPLAINT:Patient is a 78y old  Male who presents with a chief complaint of dizziness .Pt appears comfortable.    	  REVIEW OF SYSTEMS:  CONSTITUTIONAL: No fever, weight loss, or fatigue  EYES: No eye pain, visual disturbances, or discharge  ENT:  No difficulty hearing, tinnitus, vertigo; No sinus or throat pain  NECK: No pain or stiffness  RESPIRATORY: No cough, wheezing, chills or hemoptysis; No Shortness of Breath  CARDIOVASCULAR: No chest pain, palpitations, passing out, dizziness, or leg swelling  GASTROINTESTINAL: No abdominal or epigastric pain. No nausea, vomiting, or hematemesis; No diarrhea or constipation. No melena or hematochezia.  GENITOURINARY: No dysuria, frequency, hematuria, or incontinence  NEUROLOGICAL: No headaches, memory loss, loss of strength, numbness, or tremors  SKIN: No itching, burning, rashes, or lesions   LYMPH Nodes: No enlarged glands  ENDOCRINE: No heat or cold intolerance; No hair loss  MUSCULOSKELETAL: No joint pain or swelling; No muscle, back, or extremity pain  PSYCHIATRIC: No depression, anxiety, mood swings, or difficulty sleeping  HEME/LYMPH: No easy bruising, or bleeding gums  ALLERGY AND IMMUNOLOGIC: No hives or eczema	        PHYSICAL EXAM:  T(C): 36.2 (01-05-19 @ 05:45), Max: 36.4 (01-04-19 @ 14:09)  HR: 69 (01-05-19 @ 05:45) (69 - 90)  BP: 120/67 (01-05-19 @ 05:45) (105/69 - 132/63)  RR: 18 (01-05-19 @ 05:45) (17 - 18)  SpO2: 96% (01-05-19 @ 05:45) (95% - 99%)  Wt(kg): --  I&O's Summary      Appearance: Normal	  HEENT:   Normal oral mucosa, PERRL, EOMI	  Lymphatic: No lymphadenopathy  Cardiovascular: Normal S1 S2, No JVD, No murmurs, No edema  Respiratory: Lungs clear to auscultation	  Psychiatry: A & O x 3, Mood & affect appropriate  Gastrointestinal:  Soft, Non-tender, + BS	  Skin: No rashes, No ecchymoses, No cyanosis	  Neurologic: Non-focal  Extremities: Normal range of motion, No clubbing, cyanosis or edema  Vascular: Peripheral pulses palpable 2+ bilaterally    MEDICATIONS  (STANDING):  artificial  tears Solution 1 Drop(s) Both EYES three times a day  artificial  tears Solution 1 Drop(s) Both EYES three times a day  atorvastatin 40 milliGRAM(s) Oral at bedtime  cyanocobalamin 1000 MICROGram(s) Oral daily  docusate sodium 100 milliGRAM(s) Oral daily  ergocalciferol 94202 Unit(s) Oral <User Schedule>  escitalopram 20 milliGRAM(s) Oral daily  heparin  Injectable 5000 Unit(s) SubCutaneous every 12 hours  insulin lispro (HumaLOG) corrective regimen sliding scale   SubCutaneous Before meals and at bedtime  meclizine 25 milliGRAM(s) Oral every 8 hours  pantoprazole    Tablet 40 milliGRAM(s) Oral before breakfast  risperiDONE   Tablet 0.5 milliGRAM(s) Oral daily  senna 2 Tablet(s) Oral at bedtime      LABS:	 	                        12.6   7.5   )-----------( 209      ( 05 Jan 2019 06:48 )             38.0     01-05    140  |  105  |  17  ----------------------------<  139<H>  3.8   |  27  |  0.87    Ca    8.1<L>      05 Jan 2019 06:48      proBNP: Serum Pro-Brain Natriuretic Peptide: 59 pg/mL (12-23 @ 23:00)    Lipid Profile: Cholesterol 200    HDL 43      HgA1c: Hemoglobin A1C, Whole Blood: 7.9 % (12-24 @ 10:48)    TSH: Thyroid Stimulating Hormone, Serum: 2.39 uU/mL (12-24 @ 04:53)

## 2019-01-05 NOTE — PROGRESS NOTE ADULT - SUBJECTIVE AND OBJECTIVE BOX
Patient is a 78y old  Male who presents with a chief complaint of dizziness (04 Jan 2019 14:14)    pt seen in icu [  ], reg med floor [ x  ], bed [ x ], chair at bedside [   ], a+o x3 [x  ], lethargic [  ],  nad [x  ]            Allergies    penicillin (Hives)        Vitals    T(F): 97.2 (01-05-19 @ 05:45), Max: 97.6 (01-04-19 @ 14:09)  HR: 69 (01-05-19 @ 05:45) (69 - 90)  BP: 120/67 (01-05-19 @ 05:45) (105/69 - 132/63)  RR: 18 (01-05-19 @ 05:45) (17 - 18)  SpO2: 96% (01-05-19 @ 05:45) (95% - 99%)  Wt(kg): --  CAPILLARY BLOOD GLUCOSE      POCT Blood Glucose.: 136 mg/dL (05 Jan 2019 07:55)      Labs                          12.6   7.5   )-----------( 209      ( 05 Jan 2019 06:48 )             38.0       01-05    140  |  105  |  17  ----------------------------<  139<H>  3.8   |  27  |  0.87    Ca    8.1<L>      05 Jan 2019 06:48              .CSF CSF  12-29 @ 00:45   Testing in progress  --  --      .Urine Clean Catch (Midstream)  12-24 @ 10:58   <10,000 CFU/ml Normal Urogenital juliane present  --  --          Radiology Results      Meds    MEDICATIONS  (STANDING):  artificial  tears Solution 1 Drop(s) Both EYES three times a day  artificial  tears Solution 1 Drop(s) Both EYES three times a day  atorvastatin 40 milliGRAM(s) Oral at bedtime  cyanocobalamin 1000 MICROGram(s) Oral daily  docusate sodium 100 milliGRAM(s) Oral daily  ergocalciferol 41765 Unit(s) Oral <User Schedule>  escitalopram 20 milliGRAM(s) Oral daily  heparin  Injectable 5000 Unit(s) SubCutaneous every 12 hours  insulin lispro (HumaLOG) corrective regimen sliding scale   SubCutaneous Before meals and at bedtime  meclizine 25 milliGRAM(s) Oral every 8 hours  pantoprazole    Tablet 40 milliGRAM(s) Oral before breakfast  risperiDONE   Tablet 0.5 milliGRAM(s) Oral daily  senna 2 Tablet(s) Oral at bedtime      MEDICATIONS  (PRN):  acetaminophen   Tablet .. 650 milliGRAM(s) Oral every 6 hours PRN Temp greater or equal to 38C (100.4F), Mild Pain (1 - 3), Moderate Pain (4 - 6)  aluminum hydroxide/magnesium hydroxide/simethicone Suspension 30 milliLiter(s) Oral every 6 hours PRN Dyspepsia  LORazepam     Tablet 1 milliGRAM(s) Oral daily PRN Anxiety      Physical Exam      Neuro :  no focal deficits  Respiratory: CTA B/L  CV: RRR, S1S2, no murmurs,   Abdominal: Soft, NT, ND +BS,  Extremities: No edema, + peripheral pulses    ASSESSMENT    Dizziness and giddiness   r/o cns path  vasovagal episode possibly 2nd acute drug reaction,   abdominal wall fluid collection,   h/o Anxiety  Depression  HLD (hyperlipidemia)  DM (diabetes mellitus)  HTN (hypertension)  No significant past surgical history      PLAN      ct head neg  MRI brain and MRA head neg for acute infarct or aneurysm, but with partial opacification of mastoid air cells noted above  acetylcholine receptor antibody neg noted above  neuro f/u  Outpt neurology f/u for NCV testing  s/p spinal tap  f/u fluid study  cont ivig completed day 3/5, day 4 held  cont aspirin atorvastatin  cont meclizine  cont levaquin 500mg daily x 7 days for possible mastoiditis to be completed 1/4/19  cardio f/u noted  pt d/c tele  ce x2 neg noted above  stress test neg noted above  gi f/u    Epigastric burning; ? gastritis/ulcer  c/w maalox prn and PPI daily  avoid NSAID's  bowel regimen  plan for EGD/Colonoscopy on monday pending medical clearance  surg cons noted  phys tx eval rec rolling walker (5 inch wheels), home w/ home PT  cont current meds

## 2019-01-06 LAB
ANION GAP SERPL CALC-SCNC: 8 MMOL/L — SIGNIFICANT CHANGE UP (ref 5–17)
BUN SERPL-MCNC: 14 MG/DL — SIGNIFICANT CHANGE UP (ref 7–18)
CALCIUM SERPL-MCNC: 8 MG/DL — LOW (ref 8.4–10.5)
CHLORIDE SERPL-SCNC: 105 MMOL/L — SIGNIFICANT CHANGE UP (ref 96–108)
CO2 SERPL-SCNC: 27 MMOL/L — SIGNIFICANT CHANGE UP (ref 22–31)
CREAT SERPL-MCNC: 0.83 MG/DL — SIGNIFICANT CHANGE UP (ref 0.5–1.3)
GLUCOSE BLDC GLUCOMTR-MCNC: 106 MG/DL — HIGH (ref 70–99)
GLUCOSE BLDC GLUCOMTR-MCNC: 117 MG/DL — HIGH (ref 70–99)
GLUCOSE BLDC GLUCOMTR-MCNC: 127 MG/DL — HIGH (ref 70–99)
GLUCOSE BLDC GLUCOMTR-MCNC: 208 MG/DL — HIGH (ref 70–99)
GLUCOSE SERPL-MCNC: 124 MG/DL — HIGH (ref 70–99)
HCT VFR BLD CALC: 38.2 % — LOW (ref 39–50)
HGB BLD-MCNC: 12.6 G/DL — LOW (ref 13–17)
MCHC RBC-ENTMCNC: 31.4 PG — SIGNIFICANT CHANGE UP (ref 27–34)
MCHC RBC-ENTMCNC: 32.9 GM/DL — SIGNIFICANT CHANGE UP (ref 32–36)
MCV RBC AUTO: 95.3 FL — SIGNIFICANT CHANGE UP (ref 80–100)
PLATELET # BLD AUTO: 208 K/UL — SIGNIFICANT CHANGE UP (ref 150–400)
POTASSIUM SERPL-MCNC: 3.7 MMOL/L — SIGNIFICANT CHANGE UP (ref 3.5–5.3)
POTASSIUM SERPL-SCNC: 3.7 MMOL/L — SIGNIFICANT CHANGE UP (ref 3.5–5.3)
RBC # BLD: 4.01 M/UL — LOW (ref 4.2–5.8)
RBC # FLD: 11.9 % — SIGNIFICANT CHANGE UP (ref 10.3–14.5)
SODIUM SERPL-SCNC: 140 MMOL/L — SIGNIFICANT CHANGE UP (ref 135–145)
WBC # BLD: 7.3 K/UL — SIGNIFICANT CHANGE UP (ref 3.8–10.5)
WBC # FLD AUTO: 7.3 K/UL — SIGNIFICANT CHANGE UP (ref 3.8–10.5)

## 2019-01-06 RX ORDER — SOD SULF/SODIUM/NAHCO3/KCL/PEG
4000 SOLUTION, RECONSTITUTED, ORAL ORAL ONCE
Qty: 0 | Refills: 0 | Status: DISCONTINUED | OUTPATIENT
Start: 2019-01-06 | End: 2019-01-08

## 2019-01-06 RX ORDER — SODIUM CHLORIDE 9 MG/ML
1000 INJECTION INTRAMUSCULAR; INTRAVENOUS; SUBCUTANEOUS
Qty: 0 | Refills: 0 | Status: DISCONTINUED | OUTPATIENT
Start: 2019-01-06 | End: 2019-01-07

## 2019-01-06 RX ADMIN — Medication 1 DROP(S): at 06:07

## 2019-01-06 RX ADMIN — PANTOPRAZOLE SODIUM 40 MILLIGRAM(S): 20 TABLET, DELAYED RELEASE ORAL at 06:07

## 2019-01-06 RX ADMIN — Medication 1 DROP(S): at 22:21

## 2019-01-06 RX ADMIN — Medication 650 MILLIGRAM(S): at 03:00

## 2019-01-06 RX ADMIN — Medication 25 MILLIGRAM(S): at 22:22

## 2019-01-06 RX ADMIN — Medication 25 MILLIGRAM(S): at 06:07

## 2019-01-06 RX ADMIN — HEPARIN SODIUM 5000 UNIT(S): 5000 INJECTION INTRAVENOUS; SUBCUTANEOUS at 06:07

## 2019-01-06 RX ADMIN — PREGABALIN 1000 MICROGRAM(S): 225 CAPSULE ORAL at 12:43

## 2019-01-06 RX ADMIN — Medication 1 DROP(S): at 14:21

## 2019-01-06 RX ADMIN — Medication 25 MILLIGRAM(S): at 14:21

## 2019-01-06 RX ADMIN — Medication 650 MILLIGRAM(S): at 02:22

## 2019-01-06 RX ADMIN — ATORVASTATIN CALCIUM 40 MILLIGRAM(S): 80 TABLET, FILM COATED ORAL at 22:22

## 2019-01-06 RX ADMIN — HEPARIN SODIUM 5000 UNIT(S): 5000 INJECTION INTRAVENOUS; SUBCUTANEOUS at 18:54

## 2019-01-06 RX ADMIN — Medication 100 MILLIGRAM(S): at 12:43

## 2019-01-06 RX ADMIN — RISPERIDONE 0.5 MILLIGRAM(S): 4 TABLET ORAL at 12:43

## 2019-01-06 RX ADMIN — ESCITALOPRAM OXALATE 20 MILLIGRAM(S): 10 TABLET, FILM COATED ORAL at 12:43

## 2019-01-06 RX ADMIN — Medication 2: at 12:42

## 2019-01-06 NOTE — PROGRESS NOTE ADULT - SUBJECTIVE AND OBJECTIVE BOX
CHIEF COMPLAINT:Patient is a 78y old  Male who presents with a chief complaint of dizziness.Pt appears comfortable.    	  REVIEW OF SYSTEMS:  CONSTITUTIONAL: No fever, weight loss, or fatigue  EYES: No eye pain, visual disturbances, or discharge  ENT:  No difficulty hearing, tinnitus, vertigo; No sinus or throat pain  NECK: No pain or stiffness  RESPIRATORY: No cough, wheezing, chills or hemoptysis; No Shortness of Breath  CARDIOVASCULAR: No chest pain, palpitations, passing out, dizziness, or leg swelling  GASTROINTESTINAL: No abdominal or epigastric pain. No nausea, vomiting, or hematemesis; No diarrhea or constipation. No melena or hematochezia.  GENITOURINARY: No dysuria, frequency, hematuria, or incontinence  NEUROLOGICAL: No headaches, memory loss, loss of strength, numbness, or tremors  SKIN: No itching, burning, rashes, or lesions   LYMPH Nodes: No enlarged glands  ENDOCRINE: No heat or cold intolerance; No hair loss  MUSCULOSKELETAL: No joint pain or swelling; No muscle, back, or extremity pain  PSYCHIATRIC: No depression, anxiety, mood swings, or difficulty sleeping  HEME/LYMPH: No easy bruising, or bleeding gums  ALLERGY AND IMMUNOLOGIC: No hives or eczema	      PHYSICAL EXAM:  T(C): 36.2 (01-06-19 @ 11:21), Max: 36.6 (01-05-19 @ 21:25)  HR: 60 (01-06-19 @ 05:58) (60 - 82)  BP: 127/88 (01-06-19 @ 05:58) (117/79 - 127/88)  RR: 18 (01-06-19 @ 11:21) (17 - 18)  SpO2: 94% (01-06-19 @ 11:21) (94% - 95%)  Wt(kg): --  I&O's Summary    05 Jan 2019 07:01  -  06 Jan 2019 07:00  --------------------------------------------------------  IN: 0 mL / OUT: 500 mL / NET: -500 mL        Appearance: Normal	  HEENT:   Normal oral mucosa, PERRL, EOMI	  Lymphatic: No lymphadenopathy  Cardiovascular: Normal S1 S2, No JVD, No murmurs, No edema  Respiratory: Lungs clear to auscultation	  Psychiatry: A & O x 3, Mood & affect appropriate  Gastrointestinal:  Soft, Non-tender, + BS	  Skin: No rashes, No ecchymoses, No cyanosis	  Neurologic: Non-focal  Extremities: Normal range of motion, No clubbing, cyanosis or edema  Vascular: Peripheral pulses palpable 2+ bilaterally    MEDICATIONS  (STANDING):  artificial  tears Solution 1 Drop(s) Both EYES three times a day  atorvastatin 40 milliGRAM(s) Oral at bedtime  cyanocobalamin 1000 MICROGram(s) Oral daily  docusate sodium 100 milliGRAM(s) Oral daily  ergocalciferol 02335 Unit(s) Oral <User Schedule>  escitalopram 20 milliGRAM(s) Oral daily  heparin  Injectable 5000 Unit(s) SubCutaneous every 12 hours  insulin lispro (HumaLOG) corrective regimen sliding scale   SubCutaneous Before meals and at bedtime  meclizine 25 milliGRAM(s) Oral every 8 hours  pantoprazole    Tablet 40 milliGRAM(s) Oral before breakfast  polyethylene glycol/electrolyte Solution. 4000 milliLiter(s) Oral once  risperiDONE   Tablet 0.5 milliGRAM(s) Oral daily  senna 2 Tablet(s) Oral at bedtime      	  LABS:	 	                       12.6   7.3   )-----------( 208      ( 06 Jan 2019 07:31 )             38.2     01-06    140  |  105  |  14  ----------------------------<  124<H>  3.7   |  27  |  0.83    Ca    8.0<L>      06 Jan 2019 07:31      proBNP: Serum Pro-Brain Natriuretic Peptide: 59 pg/mL (12-23 @ 23:00)    Lipid Profile: Cholesterol 200    HDL 43      HgA1c: Hemoglobin A1C, Whole Blood: 7.9 % (12-24 @ 10:48)    TSH: Thyroid Stimulating Hormone, Serum: 2.39 uU/mL (12-24 @ 04:53)

## 2019-01-06 NOTE — PROGRESS NOTE ADULT - PROBLEM SELECTOR PLAN 2
Patient p/w dizziness, diaphoresis, nausea and face flushing after taking Cefuroxime. Symptoms most likely due to allergic reaction. ECG: NSR VR@92bpm T1: negative. CT brain: no acute pathology. MRI/MRA: neg for acute infarct or aneurysm but chronic microvascular ischemic changes; Attenuation distal left posterior cerebral artery. Possible mastoiditis. Echo: grossly normal LV function, mild aortic stenosis, grade 1 DD. 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 Pt reported CP, SOB, itchiness during IVIG 2/5 treatment on 12/30. Pt tolerated IVIG injection 12/31 with premedications suggested by Dr. Forrest. Patient has completed 3/5 administrations. Holding IVIG for now due to SOB and CP with it,  -Neuro Dr Suero recs paraneoplastic antibody profile; spinal tap.  -Cardio Dr Mcdowell: tele, orthostatics q shift, lisinopril discontinued  -S/p Levaquin 500mg daily x 7 days for possible mastoiditis completed 1/4/19  -Pt ENRIQUE, EOMI, and diplopia resolved  -Outpt NCV after discharge to differentiate between AIDP variant vs MG  -F/u acetylcholine receptor antibody to eval for myasthenia gravis  -CSF studies WNL except for trace RBC and mildly elevated lymphocytes  -PT recs discharge to home with home PT  -Patient to f/u with Dr. Suero as an outpatient for NCV and EMG testings

## 2019-01-06 NOTE — PROGRESS NOTE ADULT - SUBJECTIVE AND OBJECTIVE BOX
Patient is a 78y old  Male who presents with a chief complaint of dizziness (06 Jan 2019 08:15)    pt seen in icu [  ], reg med floor [ x  ], bed [ x ], chair at bedside [   ], a+o x3 [x  ], lethargic [  ],  nad [x  ]    Allergies    penicillin (Hives)        Vitals    T(F): 97.3 (01-06-19 @ 05:58), Max: 97.9 (01-05-19 @ 21:25)  HR: 60 (01-06-19 @ 05:58) (60 - 83)  BP: 127/88 (01-06-19 @ 05:58) (117/79 - 127/88)  RR: 17 (01-06-19 @ 05:58) (17 - 18)  SpO2: 94% (01-06-19 @ 05:58) (94% - 95%)  Wt(kg): --  CAPILLARY BLOOD GLUCOSE      POCT Blood Glucose.: 127 mg/dL (06 Jan 2019 08:14)      Labs                          12.6   7.3   )-----------( 208      ( 06 Jan 2019 07:31 )             38.2       01-06    140  |  105  |  14  ----------------------------<  124<H>  3.7   |  27  |  0.83    Ca    8.0<L>      06 Jan 2019 07:31              .CSF CSF  12-29 @ 00:45   No fungus isolated at 1 week. No additional interim reports will be  issued unless there is a change in culture status.  --  --      .Urine Clean Catch (Midstream)  12-24 @ 10:58   <10,000 CFU/ml Normal Urogenital juliane present  --  --          Radiology Results      Meds    MEDICATIONS  (STANDING):  artificial  tears Solution 1 Drop(s) Both EYES three times a day  atorvastatin 40 milliGRAM(s) Oral at bedtime  cyanocobalamin 1000 MICROGram(s) Oral daily  docusate sodium 100 milliGRAM(s) Oral daily  ergocalciferol 79267 Unit(s) Oral <User Schedule>  escitalopram 20 milliGRAM(s) Oral daily  heparin  Injectable 5000 Unit(s) SubCutaneous every 12 hours  insulin lispro (HumaLOG) corrective regimen sliding scale   SubCutaneous Before meals and at bedtime  meclizine 25 milliGRAM(s) Oral every 8 hours  pantoprazole    Tablet 40 milliGRAM(s) Oral before breakfast  polyethylene glycol/electrolyte Solution. 4000 milliLiter(s) Oral once  risperiDONE   Tablet 0.5 milliGRAM(s) Oral daily  senna 2 Tablet(s) Oral at bedtime      MEDICATIONS  (PRN):  acetaminophen   Tablet .. 650 milliGRAM(s) Oral every 6 hours PRN Temp greater or equal to 38C (100.4F), Mild Pain (1 - 3), Moderate Pain (4 - 6)  aluminum hydroxide/magnesium hydroxide/simethicone Suspension 30 milliLiter(s) Oral every 6 hours PRN Dyspepsia  LORazepam     Tablet 1 milliGRAM(s) Oral daily PRN Anxiety      Physical Exam    Neuro :  no focal deficits  Respiratory: CTA B/L  CV: RRR, S1S2, no murmurs,   Abdominal: Soft, NT, ND +BS,  Extremities: No edema, + peripheral pulses    ASSESSMENT    Dizziness and giddiness   r/o cns path  vasovagal episode possibly 2nd acute drug reaction,   abdominal wall fluid collection,   h/o Anxiety  Depression  HLD (hyperlipidemia)  DM (diabetes mellitus)  HTN (hypertension)  No significant past surgical history      PLAN      ct head neg  MRI brain and MRA head neg for acute infarct or aneurysm, but with partial opacification of mastoid air cells noted above  acetylcholine receptor antibody neg noted above  neuro f/u  Outpt neurology f/u for NCV testing  s/p spinal tap  fluid study neg  cont ivig completed day 3/5, day 4 held  cont aspirin atorvastatin  cont meclizine  completed levaquin 500mg daily x 7 days for possible mastoiditis 1/4/19  cardio f/u   pt d/c tele  ce x2 neg noted above  stress test neg noted above  gi f/u    Epigastric burning; ? gastritis/ulcer  c/w maalox prn and PPI daily  avoid NSAID's  bowel regimen  plan for EGD/Colonoscopy in am  surg cons noted  phys tx eval rec rolling walker (5 inch wheels), home w/ home PT  cont current meds

## 2019-01-06 NOTE — PROGRESS NOTE ADULT - PROBLEM SELECTOR PLAN 1
Patient w/ Hx of hernia repair 10yrs ago, denies abdominal pain. CT A/P: 4.7 x 0.7 x 4.0 cm thick-walled fluid collection within the anterior. Abdominal and an umbilical hernia repair site. No significant surrounding inflammation.  -Surgery consulted: recs med management  -Patient c/o burning abdominal pain  -c/w maalox and ppi for now for symptomatically management  -GI Dr. Pierre planning for EGD/Colonoscopy on upcoming Monday  -Pt will be prepped for the procedures today

## 2019-01-06 NOTE — PROGRESS NOTE ADULT - SUBJECTIVE AND OBJECTIVE BOX
PGY 1 Note discussed with supervising resident and primary attending    Patient is a 78y old  Male who presents with a chief complaint of dizziness (05 Jan 2019 11:42)      INTERVAL HPI/OVERNIGHT EVENTS: No acute events overnight, remains afebrile; HD stable, H/H stable, WBC WNL    MEDICATIONS  (STANDING):  artificial  tears Solution 1 Drop(s) Both EYES three times a day  atorvastatin 40 milliGRAM(s) Oral at bedtime  cyanocobalamin 1000 MICROGram(s) Oral daily  docusate sodium 100 milliGRAM(s) Oral daily  ergocalciferol 14153 Unit(s) Oral <User Schedule>  escitalopram 20 milliGRAM(s) Oral daily  heparin  Injectable 5000 Unit(s) SubCutaneous every 12 hours  insulin lispro (HumaLOG) corrective regimen sliding scale   SubCutaneous Before meals and at bedtime  meclizine 25 milliGRAM(s) Oral every 8 hours  pantoprazole    Tablet 40 milliGRAM(s) Oral before breakfast  risperiDONE   Tablet 0.5 milliGRAM(s) Oral daily  senna 2 Tablet(s) Oral at bedtime    MEDICATIONS  (PRN):  acetaminophen   Tablet .. 650 milliGRAM(s) Oral every 6 hours PRN Temp greater or equal to 38C (100.4F), Mild Pain (1 - 3), Moderate Pain (4 - 6)  aluminum hydroxide/magnesium hydroxide/simethicone Suspension 30 milliLiter(s) Oral every 6 hours PRN Dyspepsia  LORazepam     Tablet 1 milliGRAM(s) Oral daily PRN Anxiety      __________________________________________________  REVIEW OF SYSTEMS:    CONSTITUTIONAL: No fever,   EYES: no acute visual disturbances  NECK: No pain or stiffness  RESPIRATORY: No cough; No shortness of breath  CARDIOVASCULAR: No chest pain, no palpitations  GASTROINTESTINAL: No pain. No nausea or vomiting; No diarrhea   NEUROLOGICAL: No headache or numbness, no tremors  MUSCULOSKELETAL: No joint pain, no muscle pain  GENITOURINARY: no dysuria, no frequency, no hesitancy      Vital Signs Last 24 Hrs  T(C): 36.3 (06 Jan 2019 05:58), Max: 36.6 (05 Jan 2019 21:25)  T(F): 97.3 (06 Jan 2019 05:58), Max: 97.9 (05 Jan 2019 21:25)  HR: 60 (06 Jan 2019 05:58) (60 - 83)  BP: 127/88 (06 Jan 2019 05:58) (117/79 - 127/88)  BP(mean): --  RR: 17 (06 Jan 2019 05:58) (17 - 18)  SpO2: 94% (06 Jan 2019 05:58) (94% - 95%)    ________________________________________________  PHYSICAL EXAM:    GENERAL: NAD  HEENT: Normocephalic;  conjunctivae and sclerae clear; moist mucous membranes;   NECK : supple  CHEST/LUNG: Clear to auscultation bilaterally with good air entry   HEART: S1 S2  regular; no murmurs, gallops or rubs  ABDOMEN: Soft, Nontender, Nondistended; Bowel sounds present  EXTREMITIES: no cyanosis; no edema; no calf tenderness  SKIN: warm and dry; no rash  NERVOUS SYSTEM:  Awake and alert; no new deficits    _________________________________________________  LABS:                        12.6   7.5   )-----------( 209      ( 05 Jan 2019 06:48 )             38.0     01-05    140  |  105  |  17  ----------------------------<  139<H>  3.8   |  27  |  0.87    Ca    8.1<L>      05 Jan 2019 06:48          CAPILLARY BLOOD GLUCOSE      POCT Blood Glucose.: 127 mg/dL (06 Jan 2019 08:14)  POCT Blood Glucose.: 227 mg/dL (05 Jan 2019 21:39)  POCT Blood Glucose.: 116 mg/dL (05 Jan 2019 17:02)  POCT Blood Glucose.: 222 mg/dL (05 Jan 2019 11:39)        RADIOLOGY & ADDITIONAL TESTS:    Imaging Personally Reviewed:  YES    Consultant(s) Notes Reviewed:   YES    Care Discussed with Consultants :     Plan of care was discussed with patient and /or primary care giver; all questions and concerns were addressed and care was aligned with patient's wishes. PGY 1 Note discussed with supervising resident and primary attending    Patient is a 78y old  Male who presents with a chief complaint of dizziness (05 Jan 2019 11:42)      INTERVAL HPI/OVERNIGHT EVENTS: No acute events overnight, remains afebrile; HD stable    MEDICATIONS  (STANDING):  artificial  tears Solution 1 Drop(s) Both EYES three times a day  atorvastatin 40 milliGRAM(s) Oral at bedtime  cyanocobalamin 1000 MICROGram(s) Oral daily  docusate sodium 100 milliGRAM(s) Oral daily  ergocalciferol 02915 Unit(s) Oral <User Schedule>  escitalopram 20 milliGRAM(s) Oral daily  heparin  Injectable 5000 Unit(s) SubCutaneous every 12 hours  insulin lispro (HumaLOG) corrective regimen sliding scale   SubCutaneous Before meals and at bedtime  meclizine 25 milliGRAM(s) Oral every 8 hours  pantoprazole    Tablet 40 milliGRAM(s) Oral before breakfast  risperiDONE   Tablet 0.5 milliGRAM(s) Oral daily  senna 2 Tablet(s) Oral at bedtime    MEDICATIONS  (PRN):  acetaminophen   Tablet .. 650 milliGRAM(s) Oral every 6 hours PRN Temp greater or equal to 38C (100.4F), Mild Pain (1 - 3), Moderate Pain (4 - 6)  aluminum hydroxide/magnesium hydroxide/simethicone Suspension 30 milliLiter(s) Oral every 6 hours PRN Dyspepsia  LORazepam     Tablet 1 milliGRAM(s) Oral daily PRN Anxiety      __________________________________________________  REVIEW OF SYSTEMS:    CONSTITUTIONAL: No fever, weight loss. Dizziness, improving.  RESPIRATORY: No cough, wheezing, chills or hemoptysis; No shortness of breath  CARDIOVASCULAR: No chest pain, palpitations,  or leg swelling  GASTROINTESTINAL: Burning like abdominal pain  NEUROLOGICAL: No headaches, memory loss, loss of strength, numbness, or tremors  SKIN: No itching, burning, rashes, or lesions       Vital Signs Last 24 Hrs  T(C): 36.3 (06 Jan 2019 05:58), Max: 36.6 (05 Jan 2019 21:25)  T(F): 97.3 (06 Jan 2019 05:58), Max: 97.9 (05 Jan 2019 21:25)  HR: 60 (06 Jan 2019 05:58) (60 - 83)  BP: 127/88 (06 Jan 2019 05:58) (117/79 - 127/88)  BP(mean): --  RR: 17 (06 Jan 2019 05:58) (17 - 18)  SpO2: 94% (06 Jan 2019 05:58) (94% - 95%)    ________________________________________________  PHYSICAL EXAM:    GENERAL: NAD, well-groomed, well-developed  HEAD:  Atraumatic, Normocephalic  EYES: EOMI, PERRLA, conjunctiva and sclera clear  NECK: Supple, No JVD, Normal thyroid  CHEST/LUNG: Clear to percussion bilaterally; No rales, rhonchi, wheezing, or rubs  HEART: Regular rate and rhythm; No murmurs, rubs, or gallops  ABDOMEN: Soft, diffusely tender, distended; Bowel sounds present  NERVOUS SYSTEM:  Alert & Oriented X3, Good concentration; Motor Strength 3/5 B/LE   EXTREMITIES:  2+ Peripheral Pulses, No clubbing, cyanosis, or edema    _________________________________________________  LABS:                        12.6   7.5   )-----------( 209      ( 05 Jan 2019 06:48 )             38.0     01-05    140  |  105  |  17  ----------------------------<  139<H>  3.8   |  27  |  0.87    Ca    8.1<L>      05 Jan 2019 06:48          CAPILLARY BLOOD GLUCOSE      POCT Blood Glucose.: 127 mg/dL (06 Jan 2019 08:14)  POCT Blood Glucose.: 227 mg/dL (05 Jan 2019 21:39)  POCT Blood Glucose.: 116 mg/dL (05 Jan 2019 17:02)  POCT Blood Glucose.: 222 mg/dL (05 Jan 2019 11:39)        RADIOLOGY & ADDITIONAL TESTS:    Imaging Personally Reviewed:  YES    Consultant(s) Notes Reviewed:   YES    Care Discussed with Consultants :     Plan of care was discussed with patient and /or primary care giver; all questions and concerns were addressed and care was aligned with patient's wishes. PGY 1 Note discussed with supervising resident and primary attending    Patient is a 78y old  Male who presents with a chief complaint of dizziness (05 Jan 2019 11:42)      INTERVAL HPI/OVERNIGHT EVENTS: No acute events overnight, remains afebrile; HD stable  Pt reports dizziness is getting better. Intermittent pressure on the back of eyes and head slightly improving, explained   to the patient that these symptoms could be residual side effects of IVIG treatement as suggested by Dr. Forrest earlier.  Pt otherwise clinically stable, explained to patient once again that EGD/colonoscopy will be done tomorrow and patient will be prepped.    MEDICATIONS  (STANDING):  artificial  tears Solution 1 Drop(s) Both EYES three times a day  atorvastatin 40 milliGRAM(s) Oral at bedtime  cyanocobalamin 1000 MICROGram(s) Oral daily  docusate sodium 100 milliGRAM(s) Oral daily  ergocalciferol 42510 Unit(s) Oral <User Schedule>  escitalopram 20 milliGRAM(s) Oral daily  heparin  Injectable 5000 Unit(s) SubCutaneous every 12 hours  insulin lispro (HumaLOG) corrective regimen sliding scale   SubCutaneous Before meals and at bedtime  meclizine 25 milliGRAM(s) Oral every 8 hours  pantoprazole    Tablet 40 milliGRAM(s) Oral before breakfast  risperiDONE   Tablet 0.5 milliGRAM(s) Oral daily  senna 2 Tablet(s) Oral at bedtime    MEDICATIONS  (PRN):  acetaminophen   Tablet .. 650 milliGRAM(s) Oral every 6 hours PRN Temp greater or equal to 38C (100.4F), Mild Pain (1 - 3), Moderate Pain (4 - 6)  aluminum hydroxide/magnesium hydroxide/simethicone Suspension 30 milliLiter(s) Oral every 6 hours PRN Dyspepsia  LORazepam     Tablet 1 milliGRAM(s) Oral daily PRN Anxiety      __________________________________________________  REVIEW OF SYSTEMS:    CONSTITUTIONAL: No fever, weight loss. Dizziness, improving.  RESPIRATORY: No cough, wheezing, chills or hemoptysis; No shortness of breath  CARDIOVASCULAR: No chest pain, palpitations,  or leg swelling  GASTROINTESTINAL: Burning like abdominal pain  NEUROLOGICAL: No headaches, memory loss, loss of strength, numbness, or tremors  SKIN: No itching, burning, rashes, or lesions       Vital Signs Last 24 Hrs  T(C): 36.3 (06 Jan 2019 05:58), Max: 36.6 (05 Jan 2019 21:25)  T(F): 97.3 (06 Jan 2019 05:58), Max: 97.9 (05 Jan 2019 21:25)  HR: 60 (06 Jan 2019 05:58) (60 - 83)  BP: 127/88 (06 Jan 2019 05:58) (117/79 - 127/88)  BP(mean): --  RR: 17 (06 Jan 2019 05:58) (17 - 18)  SpO2: 94% (06 Jan 2019 05:58) (94% - 95%)    ________________________________________________  PHYSICAL EXAM:    GENERAL: NAD, well-groomed, well-developed  HEAD:  Atraumatic, Normocephalic  EYES: EOMI, PERRLA, conjunctiva and sclera clear  NECK: Supple, No JVD, Normal thyroid  CHEST/LUNG: Clear to percussion bilaterally; No rales, rhonchi, wheezing, or rubs  HEART: Regular rate and rhythm; No murmurs, rubs, or gallops  ABDOMEN: Soft, diffusely tender, distended; Bowel sounds present  NERVOUS SYSTEM:  Alert & Oriented X3, Good concentration; Motor Strength 3/5 B/LE   EXTREMITIES:  2+ Peripheral Pulses, No clubbing, cyanosis, or edema    _________________________________________________  LABS:                        12.6   7.5   )-----------( 209      ( 05 Jan 2019 06:48 )             38.0     01-05    140  |  105  |  17  ----------------------------<  139<H>  3.8   |  27  |  0.87    Ca    8.1<L>      05 Jan 2019 06:48          CAPILLARY BLOOD GLUCOSE      POCT Blood Glucose.: 127 mg/dL (06 Jan 2019 08:14)  POCT Blood Glucose.: 227 mg/dL (05 Jan 2019 21:39)  POCT Blood Glucose.: 116 mg/dL (05 Jan 2019 17:02)  POCT Blood Glucose.: 222 mg/dL (05 Jan 2019 11:39)        RADIOLOGY & ADDITIONAL TESTS:    Imaging Personally Reviewed:  YES    Consultant(s) Notes Reviewed:   YES    Care Discussed with Consultants :     Plan of care was discussed with patient and /or primary care giver; all questions and concerns were addressed and care was aligned with patient's wishes.

## 2019-01-07 LAB
ANION GAP SERPL CALC-SCNC: 8 MMOL/L — SIGNIFICANT CHANGE UP (ref 5–17)
BUN SERPL-MCNC: 11 MG/DL — SIGNIFICANT CHANGE UP (ref 7–18)
CALCIUM SERPL-MCNC: 8 MG/DL — LOW (ref 8.4–10.5)
CHLORIDE SERPL-SCNC: 104 MMOL/L — SIGNIFICANT CHANGE UP (ref 96–108)
CO2 SERPL-SCNC: 28 MMOL/L — SIGNIFICANT CHANGE UP (ref 22–31)
CREAT SERPL-MCNC: 0.75 MG/DL — SIGNIFICANT CHANGE UP (ref 0.5–1.3)
GLUCOSE BLDC GLUCOMTR-MCNC: 108 MG/DL — HIGH (ref 70–99)
GLUCOSE BLDC GLUCOMTR-MCNC: 121 MG/DL — HIGH (ref 70–99)
GLUCOSE BLDC GLUCOMTR-MCNC: 137 MG/DL — HIGH (ref 70–99)
GLUCOSE SERPL-MCNC: 106 MG/DL — HIGH (ref 70–99)
HCT VFR BLD CALC: 37 % — LOW (ref 39–50)
HGB BLD-MCNC: 12.5 G/DL — LOW (ref 13–17)
MCHC RBC-ENTMCNC: 31.7 PG — SIGNIFICANT CHANGE UP (ref 27–34)
MCHC RBC-ENTMCNC: 33.8 GM/DL — SIGNIFICANT CHANGE UP (ref 32–36)
MCV RBC AUTO: 93.9 FL — SIGNIFICANT CHANGE UP (ref 80–100)
PLATELET # BLD AUTO: 205 K/UL — SIGNIFICANT CHANGE UP (ref 150–400)
POTASSIUM SERPL-MCNC: 3.6 MMOL/L — SIGNIFICANT CHANGE UP (ref 3.5–5.3)
POTASSIUM SERPL-SCNC: 3.6 MMOL/L — SIGNIFICANT CHANGE UP (ref 3.5–5.3)
RBC # BLD: 3.94 M/UL — LOW (ref 4.2–5.8)
RBC # FLD: 12.2 % — SIGNIFICANT CHANGE UP (ref 10.3–14.5)
SODIUM SERPL-SCNC: 140 MMOL/L — SIGNIFICANT CHANGE UP (ref 135–145)
WBC # BLD: 8.7 K/UL — SIGNIFICANT CHANGE UP (ref 3.8–10.5)
WBC # FLD AUTO: 8.7 K/UL — SIGNIFICANT CHANGE UP (ref 3.8–10.5)

## 2019-01-07 PROCEDURE — 44392 COLONOSCOPY & POLYPECTOMY: CPT

## 2019-01-07 PROCEDURE — 43239 EGD BIOPSY SINGLE/MULTIPLE: CPT

## 2019-01-07 RX ORDER — SODIUM CHLORIDE 9 MG/ML
1000 INJECTION, SOLUTION INTRAVENOUS
Qty: 0 | Refills: 0 | Status: DISCONTINUED | OUTPATIENT
Start: 2019-01-07 | End: 2019-01-08

## 2019-01-07 RX ORDER — ONDANSETRON 8 MG/1
4 TABLET, FILM COATED ORAL ONCE
Qty: 0 | Refills: 0 | Status: DISCONTINUED | OUTPATIENT
Start: 2019-01-07 | End: 2019-01-08

## 2019-01-07 RX ADMIN — SODIUM CHLORIDE 70 MILLILITER(S): 9 INJECTION INTRAMUSCULAR; INTRAVENOUS; SUBCUTANEOUS at 01:07

## 2019-01-07 RX ADMIN — HEPARIN SODIUM 5000 UNIT(S): 5000 INJECTION INTRAVENOUS; SUBCUTANEOUS at 17:41

## 2019-01-07 RX ADMIN — Medication 1 DROP(S): at 05:03

## 2019-01-07 RX ADMIN — Medication 1 DROP(S): at 22:06

## 2019-01-07 RX ADMIN — ATORVASTATIN CALCIUM 40 MILLIGRAM(S): 80 TABLET, FILM COATED ORAL at 22:06

## 2019-01-07 RX ADMIN — Medication 25 MILLIGRAM(S): at 22:06

## 2019-01-07 NOTE — PROGRESS NOTE ADULT - PROBLEM SELECTOR PLAN 5
Vitamin D level 22  -start on Vitamin D supplement
discontinued acei, low bp and pt orthostatic positive  Monitor BP and adjust medications if clinically indicated
Vitamin D level 22  -start on Vitamin D supplement
discontinued acei, low bp and pt orthostatic positive  Monitor BP and adjust medications if clinically indicated
Vitamin D level 22  -start on Vitamin D supplement
discontinued acei, low bp and pt orthostatic positive  Monitor BP and adjust medications if clinically indicated
Vitamin D level 22  -start on Vitamin D supplement

## 2019-01-07 NOTE — PROGRESS NOTE ADULT - PROBLEM SELECTOR PLAN 1
Patient w/ Hx of hernia repair 10yrs ago, denies abdominal pain. CT A/P: 4.7 x 0.7 x 4.0 cm thick-walled fluid collection within the anterior. Abdominal and an umbilical hernia repair site. No significant surrounding inflammation.  -Surgery consulted: recs med management  -Patient c/o burning abdominal pain  -c/w Maalox and PPI for now for symptomatically management  -GI Dr. Pierre planning for EGD/Colonoscopy today 1/7  -Pt prepped for the procedures today

## 2019-01-07 NOTE — PROGRESS NOTE ADULT - PROBLEM SELECTOR PLAN 8
IMPROVE VTE Score: 2  HSQ for DVT chemoppx

## 2019-01-07 NOTE — PROGRESS NOTE ADULT - PROBLEM SELECTOR PLAN 6
c/w home meds
Vitamin D level 22  -start on Vitamin D supplement
c/w home meds
Vitamin D level 22  -start on Vitamin D supplement
c/w home meds
Vitamin D level 22  -start on Vitamin D supplement
Vitamin D level 22  -start on Vitamin D supplement
c/w home meds
Vitamin D level 22  -start on Vitamin D supplement
Vitamin D level 22  -start on Vitamin D supplement

## 2019-01-07 NOTE — PROGRESS NOTE ADULT - PROBLEM SELECTOR PROBLEM 5
HTN (hypertension)
Vitamin D deficiency
HTN (hypertension)
Vitamin D deficiency
HTN (hypertension)
HTN (hypertension)
Vitamin D deficiency
HTN (hypertension)
HTN (hypertension)
Vitamin D deficiency

## 2019-01-07 NOTE — PROGRESS NOTE ADULT - ASSESSMENT
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  1.Neurology f/u.  2.GI f/u noted, plan for EGD and colonoscopy today. No contarindication from cardiac standpoint for planned procedure.  3.DM-insulin.  4.Cont asa,statin.  5.GI and DVT prophylaxis.

## 2019-01-07 NOTE — PROGRESS NOTE ADULT - PROBLEM SELECTOR PROBLEM 4
DM (diabetes mellitus)
HTN (hypertension)
DM (diabetes mellitus)
HTN (hypertension)
DM (diabetes mellitus)
DM (diabetes mellitus)
HTN (hypertension)
DM (diabetes mellitus)
DM (diabetes mellitus)
HTN (hypertension)

## 2019-01-07 NOTE — PROGRESS NOTE ADULT - SUBJECTIVE AND OBJECTIVE BOX
Patient is a 78y old  Male who presents with a chief complaint of dizziness (06 Jan 2019 11:51)    pt seen in icu [  ], reg med floor [ x  ], bed [ x ], chair at bedside [   ], a+o x3 [x  ], lethargic [  ],  nad [x  ]      Allergies    penicillin (Hives)        Vitals    T(F): 97.3 (01-07-19 @ 06:18), Max: 98.7 (01-06-19 @ 17:30)  HR: 73 (01-07-19 @ 06:18) (73 - 80)  BP: 153/69 (01-07-19 @ 06:18) (122/80 - 153/69)  RR: 18 (01-07-19 @ 06:18) (17 - 19)  SpO2: 93% (01-07-19 @ 06:18) (93% - 99%)  Wt(kg): --  CAPILLARY BLOOD GLUCOSE      POCT Blood Glucose.: 108 mg/dL (07 Jan 2019 08:02)      Labs                          12.6   7.3   )-----------( 208      ( 06 Jan 2019 07:31 )             38.2       01-06    140  |  105  |  14  ----------------------------<  124<H>  3.7   |  27  |  0.83    Ca    8.0<L>      06 Jan 2019 07:31              .CSF CSF  12-29 @ 00:45   No fungus isolated at 1 week. No additional interim reports will be  issued unless there is a change in culture status.  --  --      .Urine Clean Catch (Midstream)  12-24 @ 10:58   <10,000 CFU/ml Normal Urogenital juliane present  --  --          Radiology Results      Meds    MEDICATIONS  (STANDING):  artificial  tears Solution 1 Drop(s) Both EYES three times a day  atorvastatin 40 milliGRAM(s) Oral at bedtime  cyanocobalamin 1000 MICROGram(s) Oral daily  docusate sodium 100 milliGRAM(s) Oral daily  ergocalciferol 18330 Unit(s) Oral <User Schedule>  escitalopram 20 milliGRAM(s) Oral daily  heparin  Injectable 5000 Unit(s) SubCutaneous every 12 hours  insulin lispro (HumaLOG) corrective regimen sliding scale   SubCutaneous Before meals and at bedtime  meclizine 25 milliGRAM(s) Oral every 8 hours  pantoprazole    Tablet 40 milliGRAM(s) Oral before breakfast  polyethylene glycol/electrolyte Solution. 4000 milliLiter(s) Oral once  risperiDONE   Tablet 0.5 milliGRAM(s) Oral daily  senna 2 Tablet(s) Oral at bedtime  sodium chloride 0.9%. 1000 milliLiter(s) (70 mL/Hr) IV Continuous <Continuous>      MEDICATIONS  (PRN):  acetaminophen   Tablet .. 650 milliGRAM(s) Oral every 6 hours PRN Temp greater or equal to 38C (100.4F), Mild Pain (1 - 3), Moderate Pain (4 - 6)  aluminum hydroxide/magnesium hydroxide/simethicone Suspension 30 milliLiter(s) Oral every 6 hours PRN Dyspepsia  LORazepam     Tablet 1 milliGRAM(s) Oral daily PRN Anxiety      Physical Exam      Neuro :  no focal deficits  Respiratory: CTA B/L  CV: RRR, S1S2, no murmurs,   Abdominal: Soft, NT, ND +BS,  Extremities: No edema, + peripheral pulses    ASSESSMENT    Dizziness and giddiness   r/o cns path  vasovagal episode possibly 2nd acute drug reaction,   abdominal wall fluid collection,   h/o Anxiety  Depression  HLD (hyperlipidemia)  DM (diabetes mellitus)  HTN (hypertension)  No significant past surgical history      PLAN      ct head neg  MRI brain and MRA head neg for acute infarct or aneurysm, but with partial opacification of mastoid air cells noted above  acetylcholine receptor antibody neg noted above  neuro f/u  Outpt neurology f/u for NCV testing  s/p spinal tap  fluid study neg  cont ivig completed day 3/5, day 4 held  cont aspirin atorvastatin  cont meclizine  completed levaquin 500mg daily x 7 days for possible mastoiditis 1/4/19  cardio f/u   pt d/c tele  ce x2 neg noted above  stress test neg noted above  gi f/u    Epigastric burning; ? gastritis/ulcer  c/w maalox prn and PPI daily  avoid NSAID's  bowel regimen  plan for EGD/Colonoscopy today  surg cons noted  phys tx eval rec rolling walker (5 inch wheels), home w/ home PT  cont current meds

## 2019-01-07 NOTE — PROGRESS NOTE ADULT - PROBLEM SELECTOR PROBLEM 2
Abdominal wall fluid collections
Vertigo
Abdominal wall fluid collections
Vertigo
Abdominal wall fluid collections
Abdominal wall fluid collections
Vertigo

## 2019-01-07 NOTE — PROGRESS NOTE ADULT - PROBLEM SELECTOR PROBLEM 7
Need for prophylactic measure
Depression
Need for prophylactic measure
Depression
Need for prophylactic measure
Depression
Depression
Need for prophylactic measure
Depression
Depression

## 2019-01-07 NOTE — PROGRESS NOTE ADULT - PROBLEM SELECTOR PLAN 7
IMPROVE VTE Score: 2  HSQ for DVT chemoppx
IMPROVE VTE Score: 2  HSQ for DVT chemoppx
c/w home meds
IMPROVE VTE Score: 2  HSQ for DVT chemoppx
c/w home meds
IMPROVE VTE Score: 2  HSQ for DVT chemoppx
c/w home meds

## 2019-01-07 NOTE — PROGRESS NOTE ADULT - PROBLEM SELECTOR PLAN 4
Patient on Metformin at home  c/w HiSS  A1C 7.9
c/w Ramipril 5mg therapeutic interchange  Monitor BP and adjust medications if clinically indicated
Patient on Metformin at home  c/w HiSS  A1C 7.9
discontinued acei, low bp and pt orthostatic positive  Monitor BP and adjust medications if clinically indicated
Patient on Metformin at home  c/w HiSS  A1C 7.9
Patient on Metformin at home  c/w HiSS  A1C 7.9
c/w Ramipril 5mg therapeutic interchange  Monitor BP and adjust medications if clinically indicated
c/w Ramipril 5mg therapeutic interchange  Monitor BP and adjust medications if clinically indicated
discontinued acei, low bp and pt orthostatic positive  Monitor BP and adjust medications if clinically indicated
Patient on Metformin at home  c/w HiSS  A1C 7.9
Patient on Metformin at home  c/w HiSS  A1C 7.9
discontinued acei, low bp and pt orthostatic positive  Monitor BP and adjust medications if clinically indicated

## 2019-01-07 NOTE — PROGRESS NOTE ADULT - SUBJECTIVE AND OBJECTIVE BOX
CHIEF COMPLAINT:Patient is a 78y old  Male who presents with a chief complaint of dizziness.Pt appears comfortable.    	  REVIEW OF SYSTEMS:  CONSTITUTIONAL: No fever, weight loss, or fatigue  EYES: No eye pain, visual disturbances, or discharge  ENT:  No difficulty hearing, tinnitus, vertigo; No sinus or throat pain  NECK: No pain or stiffness  RESPIRATORY: No cough, wheezing, chills or hemoptysis; No Shortness of Breath  CARDIOVASCULAR: No chest pain, palpitations, passing out, dizziness, or leg swelling  GASTROINTESTINAL: No abdominal or epigastric pain. No nausea, vomiting, or hematemesis; No diarrhea or constipation. No melena or hematochezia.  GENITOURINARY: No dysuria, frequency, hematuria, or incontinence  NEUROLOGICAL: No headaches, memory loss, loss of strength, numbness, or tremors  SKIN: No itching, burning, rashes, or lesions   LYMPH Nodes: No enlarged glands  ENDOCRINE: No heat or cold intolerance; No hair loss  MUSCULOSKELETAL: No joint pain or swelling; No muscle, back, or extremity pain  PSYCHIATRIC: No depression, anxiety, mood swings, or difficulty sleeping  HEME/LYMPH: No easy bruising, or bleeding gums  ALLERGY AND IMMUNOLOGIC: No hives or eczema	      PHYSICAL EXAM:  T(C): 36.3 (01-07-19 @ 06:18), Max: 37.1 (01-06-19 @ 17:30)  HR: 73 (01-07-19 @ 06:18) (73 - 80)  BP: 153/69 (01-07-19 @ 06:18) (122/80 - 153/69)  RR: 18 (01-07-19 @ 06:18) (17 - 19)  SpO2: 93% (01-07-19 @ 06:18) (93% - 99%)  Wt(kg): --  I&O's Summary    07 Jan 2019 07:01  -  07 Jan 2019 09:40  --------------------------------------------------------  IN: 0 mL / OUT: 275 mL / NET: -275 mL        Appearance: Normal	  HEENT:   Normal oral mucosa, PERRL, EOMI	  Lymphatic: No lymphadenopathy  Cardiovascular: Normal S1 S2, No JVD, No murmurs, No edema  Respiratory: Lungs clear to auscultation	  Psychiatry: A & O x 3, Mood & affect appropriate  Gastrointestinal:  Soft, Non-tender, + BS	  Skin: No rashes, No ecchymoses, No cyanosis	  Neurologic: Non-focal  Extremities: Normal range of motion, No clubbing, cyanosis or edema  Vascular: Peripheral pulses palpable 2+ bilaterally    MEDICATIONS  (STANDING):  artificial  tears Solution 1 Drop(s) Both EYES three times a day  atorvastatin 40 milliGRAM(s) Oral at bedtime  cyanocobalamin 1000 MICROGram(s) Oral daily  docusate sodium 100 milliGRAM(s) Oral daily  ergocalciferol 90579 Unit(s) Oral <User Schedule>  escitalopram 20 milliGRAM(s) Oral daily  heparin  Injectable 5000 Unit(s) SubCutaneous every 12 hours  insulin lispro (HumaLOG) corrective regimen sliding scale   SubCutaneous Before meals and at bedtime  meclizine 25 milliGRAM(s) Oral every 8 hours  pantoprazole    Tablet 40 milliGRAM(s) Oral before breakfast  polyethylene glycol/electrolyte Solution. 4000 milliLiter(s) Oral once  risperiDONE   Tablet 0.5 milliGRAM(s) Oral daily  senna 2 Tablet(s) Oral at bedtime  sodium chloride 0.9%. 1000 milliLiter(s) (70 mL/Hr) IV Continuous <Continuous>      	  LABS:	 	                  12.6   7.3   )-----------( 208      ( 06 Jan 2019 07:31 )             38.2     01-06    140  |  105  |  14  ----------------------------<  124<H>  3.7   |  27  |  0.83    Ca    8.0<L>      06 Jan 2019 07:31      proBNP: Serum Pro-Brain Natriuretic Peptide: 59 pg/mL (12-23 @ 23:00)    Lipid Profile: Cholesterol 200    HDL 43      HgA1c: Hemoglobin A1C, Whole Blood: 7.9 % (12-24 @ 10:48)    TSH: Thyroid Stimulating Hormone, Serum: 2.39 uU/mL (12-24 @ 04:53)      	      IMPRESSIONS:Normal Study  * Negative ECG evidence of ischemia after IV of Lexiscan.  * Review of raw data shows: The study is of good technical  quality.  * The left ventricle was normal in size. Normal myocardial  perfusion scan, with no evidence of infarction or  inducible ischemia.  * Gated wall motion analysis is performed, and shows  normal wall motion with post stress LVEF of 68%.

## 2019-01-07 NOTE — PROGRESS NOTE ADULT - SUBJECTIVE AND OBJECTIVE BOX
PGY 1 Note discussed with supervising resident and primary attending    Patient is a 78y old  Male who presents with a chief complaint of dizziness (07 Jan 2019 09:40)      INTERVAL HPI/OVERNIGHT EVENTS: No acute events overnight, remains afebrile; HD stable, H/H stable, WBC WNL  Patient prepped for EGD/colonoscopy today. Pt remains clinically stable. No new medical problems noted.    MEDICATIONS  (STANDING):  artificial  tears Solution 1 Drop(s) Both EYES three times a day  atorvastatin 40 milliGRAM(s) Oral at bedtime  cyanocobalamin 1000 MICROGram(s) Oral daily  docusate sodium 100 milliGRAM(s) Oral daily  ergocalciferol 56812 Unit(s) Oral <User Schedule>  escitalopram 20 milliGRAM(s) Oral daily  heparin  Injectable 5000 Unit(s) SubCutaneous every 12 hours  insulin lispro (HumaLOG) corrective regimen sliding scale   SubCutaneous Before meals and at bedtime  meclizine 25 milliGRAM(s) Oral every 8 hours  pantoprazole    Tablet 40 milliGRAM(s) Oral before breakfast  polyethylene glycol/electrolyte Solution. 4000 milliLiter(s) Oral once  risperiDONE   Tablet 0.5 milliGRAM(s) Oral daily  senna 2 Tablet(s) Oral at bedtime  sodium chloride 0.9%. 1000 milliLiter(s) (70 mL/Hr) IV Continuous <Continuous>    MEDICATIONS  (PRN):  acetaminophen   Tablet .. 650 milliGRAM(s) Oral every 6 hours PRN Temp greater or equal to 38C (100.4F), Mild Pain (1 - 3), Moderate Pain (4 - 6)  aluminum hydroxide/magnesium hydroxide/simethicone Suspension 30 milliLiter(s) Oral every 6 hours PRN Dyspepsia  LORazepam     Tablet 1 milliGRAM(s) Oral daily PRN Anxiety      __________________________________________________  REVIEW OF SYSTEMS:    CONSTITUTIONAL: No fever, weight loss. Dizziness, improving.  RESPIRATORY: No cough, wheezing, chills or hemoptysis; No shortness of breath  CARDIOVASCULAR: No chest pain, palpitations,  or leg swelling  GASTROINTESTINAL: Burning like abdominal pain  NEUROLOGICAL: No headaches, memory loss, loss of strength, numbness, or tremors  SKIN: No itching, burning, rashes, or lesions       Vital Signs Last 24 Hrs  T(C): 36.3 (07 Jan 2019 06:18), Max: 37.1 (06 Jan 2019 17:30)  T(F): 97.3 (07 Jan 2019 06:18), Max: 98.7 (06 Jan 2019 17:30)  HR: 73 (07 Jan 2019 06:18) (73 - 80)  BP: 153/69 (07 Jan 2019 06:18) (122/80 - 153/69)  BP(mean): --  RR: 18 (07 Jan 2019 06:18) (17 - 19)  SpO2: 93% (07 Jan 2019 06:18) (93% - 99%)    ________________________________________________  PHYSICAL EXAM:    GENERAL: NAD, well-groomed, well-developed  HEAD:  Atraumatic, Normocephalic  EYES: EOMI, PERRLA, conjunctiva and sclera clear  NECK: Supple, No JVD, Normal thyroid  CHEST/LUNG: Clear to percussion bilaterally; No rales, rhonchi, wheezing, or rubs  HEART: Regular rate and rhythm; No murmurs, rubs, or gallops  ABDOMEN: Soft, diffusely tender, distended; Bowel sounds present  NERVOUS SYSTEM:  Alert & Oriented X3, Good concentration; Motor Strength 3/5 B/LE   EXTREMITIES:  2+ Peripheral Pulses, No clubbing, cyanosis, or edema    _________________________________________________  LABS:                        12.5   8.7   )-----------( 205      ( 07 Jan 2019 10:32 )             37.0     01-07    140  |  104  |  11  ----------------------------<  106<H>  3.6   |  28  |  0.75    Ca    8.0<L>      07 Jan 2019 10:32          CAPILLARY BLOOD GLUCOSE      POCT Blood Glucose.: 108 mg/dL (07 Jan 2019 08:02)  POCT Blood Glucose.: 117 mg/dL (06 Jan 2019 21:27)  POCT Blood Glucose.: 106 mg/dL (06 Jan 2019 16:54)  POCT Blood Glucose.: 208 mg/dL (06 Jan 2019 11:42)        RADIOLOGY & ADDITIONAL TESTS:    Imaging Personally Reviewed:  YES    Consultant(s) Notes Reviewed:   YES    Care Discussed with Consultants :     Plan of care was discussed with patient and /or primary care giver; all questions and concerns were addressed and care was aligned with patient's wishes.

## 2019-01-07 NOTE — PROGRESS NOTE ADULT - PROBLEM SELECTOR PROBLEM 3
Chest pain
DM (diabetes mellitus)
Abdominal wall fluid collections
DM (diabetes mellitus)
Abdominal wall fluid collections
Abdominal wall fluid collections
DM (diabetes mellitus)
Chest pain
Chest pain
DM (diabetes mellitus)

## 2019-01-07 NOTE — PROGRESS NOTE ADULT - PROBLEM SELECTOR PLAN 3
-Patient is persistently c/o chest pain since IVIG administration, although the cardiac enzymes and EKG is normal  -Stress test 1/2/19 AM -ve  -IVIG administration stopped  -CP improving
Patient on Metformin at home  c/w HiSS  A1C 7.9
Patient on Metformin at home  c/w HiSS  A1C 7.9
Patient w/ Hx of hernia repair 10yrs ago, denies abdominal pain. CT A/P: 4.7 x 0.7 x 4.0 cm thick-walled fluid collection within the anterior. Abdominal and an umbilical hernia repair site. No significant surrounding inflammation.  -Surgery consulted: recs med management  -Patient c/o burning abdominal pain  -GI Dr Guerrero  -c/w maalox and ppi, eventual EGD/colonoscopy once medically stable
Patient on Metformin at home  c/w HiSS  A1C 7.9
Patient w/ Hx of hernia repair 10yrs ago, denies abdominal pain. CT A/P: 4.7 x 0.7 x 4.0 cm thick-walled fluid collection within the anterior. Abdominal and an umbilical hernia repair site. No significant surrounding inflammation.  -Surgery consulted: recc med management, will follow up  -Patient c/o burning abdominal pain  -GI Dr Guerrero,   -c/w maalox and ppi, eventual EGD/colonoscopy
Patient w/ Hx of hernia repair 10yrs ago, denies abdominal pain. CT A/P: 4.7 x 0.7 x 4.0 cm thick-walled fluid collection within the anterior. Abdominal and an umbilical hernia repair site. No significant surrounding inflammation.  -Surgery consulted: recs med management  -Patient c/o burning abdominal pain  -c/w maalox and ppi for now for symptomatically management  -Now that pt more stable, GI Dr. Pierre contacted 1/3 regarding possible EGD procedure during this admission  -F/u GI recommendation
-Patient is persistently c/o chest pain since IVIG administration, although the cardiac enzymes and EKG is normal  -Stress test 1/2/19 AM -ve  -IVIG administration stopped  -CP improving
-Patient is persistently c/o chest pain since IVIG administration, although the cardiac enzymes and EKG is normal  -Stress test 1/2/19 AM -ve  -IVIG administration stopped  -CP improving
Patient on Metformin at home  c/w HiSS  A1C 7.9

## 2019-01-08 VITALS
SYSTOLIC BLOOD PRESSURE: 150 MMHG | TEMPERATURE: 97 F | DIASTOLIC BLOOD PRESSURE: 78 MMHG | OXYGEN SATURATION: 100 % | RESPIRATION RATE: 18 BRPM | HEART RATE: 66 BPM

## 2019-01-08 LAB
ANION GAP SERPL CALC-SCNC: 9 MMOL/L — SIGNIFICANT CHANGE UP (ref 5–17)
BUN SERPL-MCNC: 12 MG/DL — SIGNIFICANT CHANGE UP (ref 7–18)
CALCIUM SERPL-MCNC: 8.1 MG/DL — LOW (ref 8.4–10.5)
CHLORIDE SERPL-SCNC: 105 MMOL/L — SIGNIFICANT CHANGE UP (ref 96–108)
CO2 SERPL-SCNC: 26 MMOL/L — SIGNIFICANT CHANGE UP (ref 22–31)
CREAT SERPL-MCNC: 0.78 MG/DL — SIGNIFICANT CHANGE UP (ref 0.5–1.3)
GLUCOSE BLDC GLUCOMTR-MCNC: 115 MG/DL — HIGH (ref 70–99)
GLUCOSE BLDC GLUCOMTR-MCNC: 118 MG/DL — HIGH (ref 70–99)
GLUCOSE BLDC GLUCOMTR-MCNC: 206 MG/DL — HIGH (ref 70–99)
GLUCOSE SERPL-MCNC: 110 MG/DL — HIGH (ref 70–99)
HCT VFR BLD CALC: 38.2 % — LOW (ref 39–50)
HGB BLD-MCNC: 13.1 G/DL — SIGNIFICANT CHANGE UP (ref 13–17)
MCHC RBC-ENTMCNC: 31.9 PG — SIGNIFICANT CHANGE UP (ref 27–34)
MCHC RBC-ENTMCNC: 34.2 GM/DL — SIGNIFICANT CHANGE UP (ref 32–36)
MCV RBC AUTO: 93.2 FL — SIGNIFICANT CHANGE UP (ref 80–100)
PARANEOPLASTIC AB PNL SER: ABNORMAL
PLATELET # BLD AUTO: 219 K/UL — SIGNIFICANT CHANGE UP (ref 150–400)
POTASSIUM SERPL-MCNC: 3.5 MMOL/L — SIGNIFICANT CHANGE UP (ref 3.5–5.3)
POTASSIUM SERPL-SCNC: 3.5 MMOL/L — SIGNIFICANT CHANGE UP (ref 3.5–5.3)
RBC # BLD: 4.1 M/UL — LOW (ref 4.2–5.8)
RBC # FLD: 12 % — SIGNIFICANT CHANGE UP (ref 10.3–14.5)
SODIUM SERPL-SCNC: 140 MMOL/L — SIGNIFICANT CHANGE UP (ref 135–145)
WBC # BLD: 9.1 K/UL — SIGNIFICANT CHANGE UP (ref 3.8–10.5)
WBC # FLD AUTO: 9.1 K/UL — SIGNIFICANT CHANGE UP (ref 3.8–10.5)

## 2019-01-08 PROCEDURE — 99285 EMERGENCY DEPT VISIT HI MDM: CPT | Mod: 25

## 2019-01-08 PROCEDURE — 88305 TISSUE EXAM BY PATHOLOGIST: CPT

## 2019-01-08 PROCEDURE — 85730 THROMBOPLASTIN TIME PARTIAL: CPT

## 2019-01-08 PROCEDURE — 70552 MRI BRAIN STEM W/DYE: CPT

## 2019-01-08 PROCEDURE — 78452 HT MUSCLE IMAGE SPECT MULT: CPT

## 2019-01-08 PROCEDURE — 84100 ASSAY OF PHOSPHORUS: CPT

## 2019-01-08 PROCEDURE — 82945 GLUCOSE OTHER FLUID: CPT

## 2019-01-08 PROCEDURE — 82306 VITAMIN D 25 HYDROXY: CPT

## 2019-01-08 PROCEDURE — 93306 TTE W/DOPPLER COMPLETE: CPT

## 2019-01-08 PROCEDURE — 80061 LIPID PANEL: CPT

## 2019-01-08 PROCEDURE — 83735 ASSAY OF MAGNESIUM: CPT

## 2019-01-08 PROCEDURE — 70450 CT HEAD/BRAIN W/O DYE: CPT

## 2019-01-08 PROCEDURE — 88305 TISSUE EXAM BY PATHOLOGIST: CPT | Mod: 26

## 2019-01-08 PROCEDURE — C1889: CPT

## 2019-01-08 PROCEDURE — A9502: CPT

## 2019-01-08 PROCEDURE — 87102 FUNGUS ISOLATION CULTURE: CPT

## 2019-01-08 PROCEDURE — 95957 EEG DIGITAL ANALYSIS: CPT

## 2019-01-08 PROCEDURE — 93005 ELECTROCARDIOGRAM TRACING: CPT

## 2019-01-08 PROCEDURE — 86341 ISLET CELL ANTIBODY: CPT

## 2019-01-08 PROCEDURE — 99232 SBSQ HOSP IP/OBS MODERATE 35: CPT

## 2019-01-08 PROCEDURE — 36415 COLL VENOUS BLD VENIPUNCTURE: CPT

## 2019-01-08 PROCEDURE — 81003 URINALYSIS AUTO W/O SCOPE: CPT

## 2019-01-08 PROCEDURE — 97162 PT EVAL MOD COMPLEX 30 MIN: CPT

## 2019-01-08 PROCEDURE — 86255 FLUORESCENT ANTIBODY SCREEN: CPT

## 2019-01-08 PROCEDURE — 82550 ASSAY OF CK (CPK): CPT

## 2019-01-08 PROCEDURE — 70544 MR ANGIOGRAPHY HEAD W/O DYE: CPT

## 2019-01-08 PROCEDURE — 84484 ASSAY OF TROPONIN QUANT: CPT

## 2019-01-08 PROCEDURE — 82607 VITAMIN B-12: CPT

## 2019-01-08 PROCEDURE — 83916 OLIGOCLONAL BANDS: CPT

## 2019-01-08 PROCEDURE — 85610 PROTHROMBIN TIME: CPT

## 2019-01-08 PROCEDURE — 88312 SPECIAL STAINS GROUP 1: CPT

## 2019-01-08 PROCEDURE — 80048 BASIC METABOLIC PNL TOTAL CA: CPT

## 2019-01-08 PROCEDURE — 86041 ACETYLCHOLN RCPTR BNDNG ANTB: CPT

## 2019-01-08 PROCEDURE — 83880 ASSAY OF NATRIURETIC PEPTIDE: CPT

## 2019-01-08 PROCEDURE — 82746 ASSAY OF FOLIC ACID SERUM: CPT

## 2019-01-08 PROCEDURE — 85027 COMPLETE CBC AUTOMATED: CPT

## 2019-01-08 PROCEDURE — 71045 X-RAY EXAM CHEST 1 VIEW: CPT

## 2019-01-08 PROCEDURE — 87086 URINE CULTURE/COLONY COUNT: CPT

## 2019-01-08 PROCEDURE — 83036 HEMOGLOBIN GLYCOSYLATED A1C: CPT

## 2019-01-08 PROCEDURE — 82962 GLUCOSE BLOOD TEST: CPT

## 2019-01-08 PROCEDURE — 88312 SPECIAL STAINS GROUP 1: CPT | Mod: 26

## 2019-01-08 PROCEDURE — 84157 ASSAY OF PROTEIN OTHER: CPT

## 2019-01-08 PROCEDURE — 74174 CTA ABD&PLVS W/CONTRAST: CPT

## 2019-01-08 PROCEDURE — 84443 ASSAY THYROID STIM HORMONE: CPT

## 2019-01-08 PROCEDURE — 95819 EEG AWAKE AND ASLEEP: CPT

## 2019-01-08 PROCEDURE — 89051 BODY FLUID CELL COUNT: CPT

## 2019-01-08 PROCEDURE — 82553 CREATINE MB FRACTION: CPT

## 2019-01-08 PROCEDURE — 93017 CV STRESS TEST TRACING ONLY: CPT

## 2019-01-08 PROCEDURE — 80053 COMPREHEN METABOLIC PANEL: CPT

## 2019-01-08 PROCEDURE — 71275 CT ANGIOGRAPHY CHEST: CPT

## 2019-01-08 RX ORDER — OMEPRAZOLE 10 MG/1
1 CAPSULE, DELAYED RELEASE ORAL
Qty: 30 | Refills: 0 | OUTPATIENT
Start: 2019-01-08 | End: 2019-02-06

## 2019-01-08 RX ADMIN — Medication 25 MILLIGRAM(S): at 13:16

## 2019-01-08 RX ADMIN — RISPERIDONE 0.5 MILLIGRAM(S): 4 TABLET ORAL at 11:35

## 2019-01-08 RX ADMIN — Medication 1 DROP(S): at 06:18

## 2019-01-08 RX ADMIN — Medication 2: at 11:36

## 2019-01-08 RX ADMIN — ESCITALOPRAM OXALATE 20 MILLIGRAM(S): 10 TABLET, FILM COATED ORAL at 11:35

## 2019-01-08 RX ADMIN — PREGABALIN 1000 MICROGRAM(S): 225 CAPSULE ORAL at 11:35

## 2019-01-08 RX ADMIN — Medication 100 MILLIGRAM(S): at 11:35

## 2019-01-08 RX ADMIN — Medication 25 MILLIGRAM(S): at 06:18

## 2019-01-08 RX ADMIN — Medication 1 DROP(S): at 13:15

## 2019-01-08 RX ADMIN — PANTOPRAZOLE SODIUM 40 MILLIGRAM(S): 20 TABLET, DELAYED RELEASE ORAL at 06:18

## 2019-01-08 NOTE — PROGRESS NOTE ADULT - ASSESSMENT
Patient seen and examined sitting OOB-chair in [facility]. He is s/p EGD/colonoscopy yesterday. He denies abd pain, N/V. He reports improvement in burning abd pain. Patient seen and examined sitting OOB-chair in [facility]. He is s/p EGD/colonoscopy yesterday showing gastritis, diverticulosis, colon polyp. He denies abd pain, N/V. He reports improvement in burning abd pain.

## 2019-01-08 NOTE — PROGRESS NOTE ADULT - PROBLEM SELECTOR PROBLEM 1
Vertigo
Abdominal pain
Abdominal pain
Chest pain
Vertigo
Abdominal pain
Abdominal pain
Chest pain
Chest pain
Vertigo
Abdominal wall fluid collections
Abdominal wall fluid collections
Vertigo
Vertigo
Abdominal wall fluid collections

## 2019-01-08 NOTE — PROGRESS NOTE ADULT - ASSESSMENT
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  1.Neurology f/u.  2.GI f/u,s/p EGD and colonoscopy -gastritis,s/p polpectomy,diverticuli.  3.DM-insulin.  4.Cont asa,statin.  5.F/U pathology.  6.GI and DVT prophylaxis.

## 2019-01-08 NOTE — PROGRESS NOTE ADULT - SUBJECTIVE AND OBJECTIVE BOX
Summary:  78y Male  presents with a chief complaint of dizziness. GI called to evaluate patient's  c/o abdominal pain. The patient has a significant past medical history of Anxiety, Depression HLD (hyperlipidemia), DM (diabetes mellitus), and HTN (hypertension)    Subjective: Reports improvement in abdominal burning. Denies N/V or diarrhea, + h/o constipation. Tolerating regular diet.       Objective:    MEDICATIONS  (STANDING):  artificial  tears Solution 1 Drop(s) Both EYES three times a day  atorvastatin 40 milliGRAM(s) Oral at bedtime  cyanocobalamin 1000 MICROGram(s) Oral daily  dextrose 5% + sodium chloride 0.9%. 1000 milliLiter(s) (70 mL/Hr) IV Continuous <Continuous>  docusate sodium 100 milliGRAM(s) Oral daily  ergocalciferol 77252 Unit(s) Oral <User Schedule>  escitalopram 20 milliGRAM(s) Oral daily  heparin  Injectable 5000 Unit(s) SubCutaneous every 12 hours  insulin lispro (HumaLOG) corrective regimen sliding scale   SubCutaneous Before meals and at bedtime  meclizine 25 milliGRAM(s) Oral every 8 hours  pantoprazole    Tablet 40 milliGRAM(s) Oral before breakfast  polyethylene glycol/electrolyte Solution. 4000 milliLiter(s) Oral once  risperiDONE   Tablet 0.5 milliGRAM(s) Oral daily  senna 2 Tablet(s) Oral at bedtime    MEDICATIONS  (PRN):  acetaminophen   Tablet .. 650 milliGRAM(s) Oral every 6 hours PRN Temp greater or equal to 38C (100.4F), Mild Pain (1 - 3), Moderate Pain (4 - 6)  aluminum hydroxide/magnesium hydroxide/simethicone Suspension 30 milliLiter(s) Oral every 6 hours PRN Dyspepsia  ondansetron Injectable 4 milliGRAM(s) IV Push once PRN Nausea and/or Vomiting              Vital Signs Last 24 Hrs  T(C): 36.2 (08 Jan 2019 06:14), Max: 36.8 (07 Jan 2019 21:35)  T(F): 97.1 (08 Jan 2019 06:14), Max: 98.2 (07 Jan 2019 21:35)  HR: 74 (08 Jan 2019 06:14) (64 - 74)  BP: 135/57 (08 Jan 2019 06:14) (118/58 - 135/57)  BP(mean): --  RR: 17 (08 Jan 2019 06:14) (17 - 18)  SpO2: 94% (08 Jan 2019 06:14) (94% - 95%)      General:  Well developed, well nourished, alert and active, no pallor, NAD.  HEENT:    Normal appearance of conjunctiva, ears, nose, lips, oropharynx, and oral mucosa, anicteric.  Neck:  No masses, no asymmetry.  Lymph Nodes:  No lymphadenopathy.   Cardiovascular:  RRR normal S1/S2, no murmur.  Respiratory:  CTA B/L, normal respiratory effort.   Abdominal:   soft, no masses or tenderness, normoactive BS, NT/ND, no HSM.  Extremities:   No clubbing or cyanosis, normal capillary refill, no edema.   Skin:   No rash, jaundice, lesions, eczema.   Musculoskeletal:  No joint swelling, erythema or tenderness.   Neuro: No focal deficits.   Other:       LABS:                        13.1   9.1   )-----------( 219      ( 08 Jan 2019 07:01 )             38.2     01-08    140  |  105  |  12  ----------------------------<  110<H>  3.5   |  26  |  0.78    Ca    8.1<L>      08 Jan 2019 07:01            RADIOLOGY & ADDITIONAL TESTS:

## 2019-01-08 NOTE — PROGRESS NOTE ADULT - SUBJECTIVE AND OBJECTIVE BOX
CHIEF COMPLAINT: Patient is a 78y old  Male who presents with a chief complaint of dizziness. Pt appears comfortable.    	  REVIEW OF SYSTEMS:  CONSTITUTIONAL: No fever, weight loss, or fatigue  EYES: No eye pain, visual disturbances, or discharge  ENT:  No difficulty hearing, tinnitus, vertigo; No sinus or throat pain  NECK: No pain or stiffness  RESPIRATORY: No cough, wheezing, chills or hemoptysis; No Shortness of Breath  CARDIOVASCULAR: No chest pain, palpitations, passing out, dizziness, or leg swelling  GASTROINTESTINAL: No abdominal or epigastric pain. No nausea, vomiting, or hematemesis; No diarrhea or constipation. No melena or hematochezia.  GENITOURINARY: No dysuria, frequency, hematuria, or incontinence  NEUROLOGICAL: No headaches, memory loss, loss of strength, numbness, or tremors  SKIN: No itching, burning, rashes, or lesions   LYMPH Nodes: No enlarged glands  ENDOCRINE: No heat or cold intolerance; No hair loss  MUSCULOSKELETAL: No joint pain or swelling; No muscle, back, or extremity pain  PSYCHIATRIC: No depression, anxiety, mood swings, or difficulty sleeping  HEME/LYMPH: No easy bruising, or bleeding gums  ALLERGY AND IMMUNOLOGIC: No hives or eczema	      PHYSICAL EXAM:  T(C): 36.2 (01-08-19 @ 06:14), Max: 36.8 (01-07-19 @ 21:35)  HR: 74 (01-08-19 @ 06:14) (64 - 74)  BP: 135/57 (01-08-19 @ 06:14) (118/58 - 135/57)  RR: 17 (01-08-19 @ 06:14) (17 - 18)  SpO2: 94% (01-08-19 @ 06:14) (94% - 95%)    I&O's Summary    07 Jan 2019 07:01  -  08 Jan 2019 07:00  --------------------------------------------------------  IN: 0 mL / OUT: 550 mL / NET: -550 mL        Appearance: Normal	  HEENT:   Normal oral mucosa, PERRL, EOMI	  Lymphatic: No lymphadenopathy  Cardiovascular: Normal S1 S2, No JVD, No murmurs, No edema  Respiratory: Lungs clear to auscultation	  Psychiatry: A & O x 3, Mood & affect appropriate  Gastrointestinal:  Soft, Non-tender, + BS	  Skin: No rashes, No ecchymoses, No cyanosis	  Neurologic: Non-focal  Extremities: Normal range of motion, No clubbing, cyanosis or edema  Vascular: Peripheral pulses palpable 2+ bilaterally    MEDICATIONS  (STANDING):  artificial  tears Solution 1 Drop(s) Both EYES three times a day  atorvastatin 40 milliGRAM(s) Oral at bedtime  cyanocobalamin 1000 MICROGram(s) Oral daily  dextrose 5% + sodium chloride 0.9%. 1000 milliLiter(s) (70 mL/Hr) IV Continuous <Continuous>  docusate sodium 100 milliGRAM(s) Oral daily  ergocalciferol 09833 Unit(s) Oral <User Schedule>  escitalopram 20 milliGRAM(s) Oral daily  heparin  Injectable 5000 Unit(s) SubCutaneous every 12 hours  insulin lispro (HumaLOG) corrective regimen sliding scale   SubCutaneous Before meals and at bedtime  meclizine 25 milliGRAM(s) Oral every 8 hours  pantoprazole    Tablet 40 milliGRAM(s) Oral before breakfast  polyethylene glycol/electrolyte Solution. 4000 milliLiter(s) Oral once  risperiDONE   Tablet 0.5 milliGRAM(s) Oral daily  senna 2 Tablet(s) Oral at bedtime        	  LABS:	 	                       13.1   9.1   )-----------( 219      ( 08 Jan 2019 07:01 )             38.2     01-08    140  |  105  |  12  ----------------------------<  110<H>  3.5   |  26  |  0.78    Ca    8.1<L>      08 Jan 2019 07:01      proBNP: Serum Pro-Brain Natriuretic Peptide: 59 pg/mL (12-23 @ 23:00)    Lipid Profile: Cholesterol 200    HDL 43      HgA1c: Hemoglobin A1C, Whole Blood: 7.9 % (12-24 @ 10:48)    TSH: Thyroid Stimulating Hormone, Serum: 2.39 uU/mL (12-24 @ 04:53)

## 2019-01-08 NOTE — PROGRESS NOTE ADULT - PROVIDER SPECIALTY LIST ADULT
Cardiology
Gastroenterology
Internal Medicine
Neurology
Cardiology
Internal Medicine
Cardiology
Neurology
Gastroenterology
Gastroenterology
Internal Medicine

## 2019-01-08 NOTE — PROGRESS NOTE ADULT - REASON FOR ADMISSION
dizziness

## 2019-01-08 NOTE — PROGRESS NOTE ADULT - SUBJECTIVE AND OBJECTIVE BOX
Patient is a 78y old  Male who presents with a chief complaint of dizziness (07 Jan 2019 11:07)    pt seen in icu [  ], reg med floor [   ], bed [  ], chair at bedside [   ], a+o x3 [  ], lethargic [  ],  nad [  ]    olson [  ], ngt [  ], peg [  ], et tube [  ], cent line [  ], picc line [  ]        Allergies    penicillin (Hives)        Vitals    T(F): 97.1 (01-08-19 @ 06:14), Max: 98.2 (01-07-19 @ 21:35)  HR: 74 (01-08-19 @ 06:14) (64 - 74)  BP: 135/57 (01-08-19 @ 06:14) (118/58 - 135/57)  RR: 17 (01-08-19 @ 06:14) (17 - 18)  SpO2: 94% (01-08-19 @ 06:14) (94% - 95%)  Wt(kg): --  CAPILLARY BLOOD GLUCOSE      POCT Blood Glucose.: 115 mg/dL (08 Jan 2019 07:49)      Labs                          13.1   9.1   )-----------( 219      ( 08 Jan 2019 07:01 )             38.2       01-08    140  |  105  |  12  ----------------------------<  110<H>  3.5   |  26  |  0.78    Ca    8.1<L>      08 Jan 2019 07:01              .CSF CSF  12-29 @ 00:45   No fungus isolated at 1 week. No additional interim reports will be  issued unless there is a change in culture status.  --  --      .Urine Clean Catch (Midstream)  12-24 @ 10:58   <10,000 CFU/ml Normal Urogenital juliane present  --  --          Radiology Results      Meds    MEDICATIONS  (STANDING):  artificial  tears Solution 1 Drop(s) Both EYES three times a day  atorvastatin 40 milliGRAM(s) Oral at bedtime  cyanocobalamin 1000 MICROGram(s) Oral daily  dextrose 5% + sodium chloride 0.9%. 1000 milliLiter(s) (70 mL/Hr) IV Continuous <Continuous>  docusate sodium 100 milliGRAM(s) Oral daily  ergocalciferol 33436 Unit(s) Oral <User Schedule>  escitalopram 20 milliGRAM(s) Oral daily  heparin  Injectable 5000 Unit(s) SubCutaneous every 12 hours  insulin lispro (HumaLOG) corrective regimen sliding scale   SubCutaneous Before meals and at bedtime  meclizine 25 milliGRAM(s) Oral every 8 hours  pantoprazole    Tablet 40 milliGRAM(s) Oral before breakfast  polyethylene glycol/electrolyte Solution. 4000 milliLiter(s) Oral once  risperiDONE   Tablet 0.5 milliGRAM(s) Oral daily  senna 2 Tablet(s) Oral at bedtime      MEDICATIONS  (PRN):  acetaminophen   Tablet .. 650 milliGRAM(s) Oral every 6 hours PRN Temp greater or equal to 38C (100.4F), Mild Pain (1 - 3), Moderate Pain (4 - 6)  aluminum hydroxide/magnesium hydroxide/simethicone Suspension 30 milliLiter(s) Oral every 6 hours PRN Dyspepsia  ondansetron Injectable 4 milliGRAM(s) IV Push once PRN Nausea and/or Vomiting      Physical Exam    Neuro :  no focal deficits  Respiratory: CTA B/L  CV: RRR, S1S2, no murmurs,   Abdominal: Soft, NT, ND +BS,  Extremities: No edema, + peripheral pulses    ASSESSMENT    Dizziness and giddiness  Anxiety  Depression  HLD (hyperlipidemia)  DM (diabetes mellitus)  HTN (hypertension)  No significant past surgical history      PLAN Patient is a 78y old  Male who presents with a chief complaint of dizziness (07 Jan 2019 11:07)    pt seen in icu [  ], reg med floor [ x  ], bed [  ], chair at bedside [  x ], a+o x3 [ x ], lethargic [  ],  nad [x  ]        Allergies    penicillin (Hives)        Vitals    T(F): 97.1 (01-08-19 @ 06:14), Max: 98.2 (01-07-19 @ 21:35)  HR: 74 (01-08-19 @ 06:14) (64 - 74)  BP: 135/57 (01-08-19 @ 06:14) (118/58 - 135/57)  RR: 17 (01-08-19 @ 06:14) (17 - 18)  SpO2: 94% (01-08-19 @ 06:14) (94% - 95%)  Wt(kg): --  CAPILLARY BLOOD GLUCOSE      POCT Blood Glucose.: 115 mg/dL (08 Jan 2019 07:49)      Labs                          13.1   9.1   )-----------( 219      ( 08 Jan 2019 07:01 )             38.2       01-08    140  |  105  |  12  ----------------------------<  110<H>  3.5   |  26  |  0.78    Ca    8.1<L>      08 Jan 2019 07:01              .CSF CSF  12-29 @ 00:45   No fungus isolated at 1 week. No additional interim reports will be  issued unless there is a change in culture status.  --  --      .Urine Clean Catch (Midstream)  12-24 @ 10:58   <10,000 CFU/ml Normal Urogenital juliane present  --  --          Radiology Results      Meds    MEDICATIONS  (STANDING):  artificial  tears Solution 1 Drop(s) Both EYES three times a day  atorvastatin 40 milliGRAM(s) Oral at bedtime  cyanocobalamin 1000 MICROGram(s) Oral daily  dextrose 5% + sodium chloride 0.9%. 1000 milliLiter(s) (70 mL/Hr) IV Continuous <Continuous>  docusate sodium 100 milliGRAM(s) Oral daily  ergocalciferol 67520 Unit(s) Oral <User Schedule>  escitalopram 20 milliGRAM(s) Oral daily  heparin  Injectable 5000 Unit(s) SubCutaneous every 12 hours  insulin lispro (HumaLOG) corrective regimen sliding scale   SubCutaneous Before meals and at bedtime  meclizine 25 milliGRAM(s) Oral every 8 hours  pantoprazole    Tablet 40 milliGRAM(s) Oral before breakfast  polyethylene glycol/electrolyte Solution. 4000 milliLiter(s) Oral once  risperiDONE   Tablet 0.5 milliGRAM(s) Oral daily  senna 2 Tablet(s) Oral at bedtime      MEDICATIONS  (PRN):  acetaminophen   Tablet .. 650 milliGRAM(s) Oral every 6 hours PRN Temp greater or equal to 38C (100.4F), Mild Pain (1 - 3), Moderate Pain (4 - 6)  aluminum hydroxide/magnesium hydroxide/simethicone Suspension 30 milliLiter(s) Oral every 6 hours PRN Dyspepsia  ondansetron Injectable 4 milliGRAM(s) IV Push once PRN Nausea and/or Vomiting      Physical Exam    Neuro :  no focal deficits  Respiratory: CTA B/L  CV: RRR, S1S2, no murmurs,   Abdominal: Soft, NT, ND +BS,  Extremities: No edema, + peripheral pulses    ASSESSMENT    Dizziness and giddiness   r/o cns path  vasovagal episode possibly 2nd acute drug reaction,   abdominal wall fluid collection,   h/o Anxiety  Depression  HLD (hyperlipidemia)  DM (diabetes mellitus)  HTN (hypertension)  No significant past surgical history      PLAN      ct head neg  MRI brain and MRA head neg for acute infarct or aneurysm, but with partial opacification of mastoid air cells noted above  acetylcholine receptor antibody neg noted above  neuro f/u  Outpt neurology f/u for NCV testing  s/p spinal tap  fluid study neg  cont ivig completed day 3/5, day 4 held  cont aspirin atorvastatin  cont meclizine  completed levaquin 500mg daily x 7 days for possible mastoiditis 1/4/19  cardio f/u   pt d/c tele  ce x2 neg noted above  stress test neg noted above  gi f/u    s/p EGD/Colonoscopy showing mild gastritis, gerd, diverticulosis, colonic polyp  pt to repeat colonoscopy in 3 years  f/u patho outpt  cont protonix  surg cons noted  phys tx eval rec rolling walker (5 inch wheels), home w/ home PT  cont current meds  pt stable for d/c

## 2019-01-26 LAB
CULTURE RESULTS: SIGNIFICANT CHANGE UP
SPECIMEN SOURCE: SIGNIFICANT CHANGE UP

## 2020-02-13 ENCOUNTER — EMERGENCY (EMERGENCY)
Facility: HOSPITAL | Age: 80
LOS: 1 days | Discharge: HOME | End: 2020-02-13
Attending: EMERGENCY MEDICINE
Payer: COMMERCIAL

## 2020-02-13 VITALS
HEIGHT: 68 IN | TEMPERATURE: 98 F | HEART RATE: 72 BPM | RESPIRATION RATE: 20 BRPM | OXYGEN SATURATION: 96 % | DIASTOLIC BLOOD PRESSURE: 87 MMHG | SYSTOLIC BLOOD PRESSURE: 140 MMHG | WEIGHT: 250 LBS

## 2020-02-13 LAB
ALBUMIN SERPL ELPH-MCNC: 3.5 G/DL — SIGNIFICANT CHANGE UP (ref 3.5–5)
ALP SERPL-CCNC: 58 U/L — SIGNIFICANT CHANGE UP (ref 40–120)
ALT FLD-CCNC: 29 U/L DA — SIGNIFICANT CHANGE UP (ref 10–60)
ANION GAP SERPL CALC-SCNC: 4 MMOL/L — LOW (ref 5–17)
AST SERPL-CCNC: 18 U/L — SIGNIFICANT CHANGE UP (ref 10–40)
BASE EXCESS BLDV CALC-SCNC: 4.2 MMOL/L — HIGH (ref -2–2)
BASOPHILS # BLD AUTO: 0.06 K/UL — SIGNIFICANT CHANGE UP (ref 0–0.2)
BASOPHILS NFR BLD AUTO: 0.8 % — SIGNIFICANT CHANGE UP (ref 0–2)
BILIRUB SERPL-MCNC: 0.5 MG/DL — SIGNIFICANT CHANGE UP (ref 0.2–1.2)
BLOOD GAS COMMENTS, VENOUS: SIGNIFICANT CHANGE UP
BUN SERPL-MCNC: 10 MG/DL — SIGNIFICANT CHANGE UP (ref 7–18)
CALCIUM SERPL-MCNC: 8.2 MG/DL — LOW (ref 8.4–10.5)
CHLORIDE SERPL-SCNC: 107 MMOL/L — SIGNIFICANT CHANGE UP (ref 96–108)
CO2 SERPL-SCNC: 30 MMOL/L — SIGNIFICANT CHANGE UP (ref 22–31)
CREAT SERPL-MCNC: 0.87 MG/DL — SIGNIFICANT CHANGE UP (ref 0.5–1.3)
EOSINOPHIL # BLD AUTO: 0.39 K/UL — SIGNIFICANT CHANGE UP (ref 0–0.5)
EOSINOPHIL NFR BLD AUTO: 4.9 % — SIGNIFICANT CHANGE UP (ref 0–6)
GLUCOSE SERPL-MCNC: 97 MG/DL — SIGNIFICANT CHANGE UP (ref 70–99)
HCO3 BLDV-SCNC: 30 MMOL/L — HIGH (ref 21–29)
HCT VFR BLD CALC: 39.8 % — SIGNIFICANT CHANGE UP (ref 39–50)
HGB BLD-MCNC: 13.2 G/DL — SIGNIFICANT CHANGE UP (ref 13–17)
HOROWITZ INDEX BLDV+IHG-RTO: 21 — SIGNIFICANT CHANGE UP
IMM GRANULOCYTES NFR BLD AUTO: 0.3 % — SIGNIFICANT CHANGE UP (ref 0–1.5)
LYMPHOCYTES # BLD AUTO: 2.21 K/UL — SIGNIFICANT CHANGE UP (ref 1–3.3)
LYMPHOCYTES # BLD AUTO: 27.8 % — SIGNIFICANT CHANGE UP (ref 13–44)
MCHC RBC-ENTMCNC: 31.4 PG — SIGNIFICANT CHANGE UP (ref 27–34)
MCHC RBC-ENTMCNC: 33.2 GM/DL — SIGNIFICANT CHANGE UP (ref 32–36)
MCV RBC AUTO: 94.5 FL — SIGNIFICANT CHANGE UP (ref 80–100)
MONOCYTES # BLD AUTO: 0.56 K/UL — SIGNIFICANT CHANGE UP (ref 0–0.9)
MONOCYTES NFR BLD AUTO: 7.1 % — SIGNIFICANT CHANGE UP (ref 2–14)
NEUTROPHILS # BLD AUTO: 4.7 K/UL — SIGNIFICANT CHANGE UP (ref 1.8–7.4)
NEUTROPHILS NFR BLD AUTO: 59.1 % — SIGNIFICANT CHANGE UP (ref 43–77)
NRBC # BLD: 0 /100 WBCS — SIGNIFICANT CHANGE UP (ref 0–0)
NT-PROBNP SERPL-SCNC: 119 PG/ML — SIGNIFICANT CHANGE UP (ref 0–450)
PCO2 BLDV: 50 MMHG — SIGNIFICANT CHANGE UP (ref 35–50)
PH BLDV: 7.39 — SIGNIFICANT CHANGE UP (ref 7.35–7.45)
PLATELET # BLD AUTO: 214 K/UL — SIGNIFICANT CHANGE UP (ref 150–400)
PO2 BLDV: 25 MMHG — SIGNIFICANT CHANGE UP (ref 25–45)
POTASSIUM SERPL-MCNC: 3.6 MMOL/L — SIGNIFICANT CHANGE UP (ref 3.5–5.3)
POTASSIUM SERPL-SCNC: 3.6 MMOL/L — SIGNIFICANT CHANGE UP (ref 3.5–5.3)
PROT SERPL-MCNC: 6.8 G/DL — SIGNIFICANT CHANGE UP (ref 6–8.3)
RBC # BLD: 4.21 M/UL — SIGNIFICANT CHANGE UP (ref 4.2–5.8)
RBC # FLD: 13 % — SIGNIFICANT CHANGE UP (ref 10.3–14.5)
SAO2 % BLDV: 40 % — LOW (ref 67–88)
SODIUM SERPL-SCNC: 141 MMOL/L — SIGNIFICANT CHANGE UP (ref 135–145)
TROPONIN I SERPL-MCNC: <0.015 NG/ML — SIGNIFICANT CHANGE UP (ref 0–0.04)
WBC # BLD: 7.94 K/UL — SIGNIFICANT CHANGE UP (ref 3.8–10.5)
WBC # FLD AUTO: 7.94 K/UL — SIGNIFICANT CHANGE UP (ref 3.8–10.5)

## 2020-02-13 PROCEDURE — 84484 ASSAY OF TROPONIN QUANT: CPT

## 2020-02-13 PROCEDURE — 99283 EMERGENCY DEPT VISIT LOW MDM: CPT | Mod: 25

## 2020-02-13 PROCEDURE — 83880 ASSAY OF NATRIURETIC PEPTIDE: CPT

## 2020-02-13 PROCEDURE — 99284 EMERGENCY DEPT VISIT MOD MDM: CPT

## 2020-02-13 PROCEDURE — 71046 X-RAY EXAM CHEST 2 VIEWS: CPT | Mod: 26

## 2020-02-13 PROCEDURE — 85027 COMPLETE CBC AUTOMATED: CPT

## 2020-02-13 PROCEDURE — 94640 AIRWAY INHALATION TREATMENT: CPT

## 2020-02-13 PROCEDURE — 36415 COLL VENOUS BLD VENIPUNCTURE: CPT

## 2020-02-13 PROCEDURE — 71046 X-RAY EXAM CHEST 2 VIEWS: CPT

## 2020-02-13 PROCEDURE — 80053 COMPREHEN METABOLIC PANEL: CPT

## 2020-02-13 PROCEDURE — 93005 ELECTROCARDIOGRAM TRACING: CPT

## 2020-02-13 PROCEDURE — 82803 BLOOD GASES ANY COMBINATION: CPT

## 2020-02-13 RX ORDER — ALBUTEROL 90 UG/1
2 AEROSOL, METERED ORAL
Qty: 1 | Refills: 0
Start: 2020-02-13 | End: 2020-03-13

## 2020-02-13 RX ORDER — IPRATROPIUM/ALBUTEROL SULFATE 18-103MCG
3 AEROSOL WITH ADAPTER (GRAM) INHALATION ONCE
Refills: 0 | Status: COMPLETED | OUTPATIENT
Start: 2020-02-13 | End: 2020-02-13

## 2020-02-13 RX ADMIN — Medication 40 MILLIGRAM(S): at 17:57

## 2020-02-13 RX ADMIN — Medication 200 MILLIGRAM(S): at 18:00

## 2020-02-13 RX ADMIN — Medication 3 MILLILITER(S): at 17:58

## 2020-02-13 NOTE — ED PROVIDER NOTE - PROGRESS NOTE DETAILS
Marie: pt work up neg.  cxr clear  dx bronchitis, viral.  rx robitussin, prednisone, albuterol pump.  f/u with pcp. left ambulatory.  return precautions given.

## 2020-02-13 NOTE — ED PROVIDER NOTE - PATIENT PORTAL LINK FT
You can access the FollowMyHealth Patient Portal offered by [facility] by registering at the following website: [email or web address]. By joining [employer]’s FollowMyHealth portal, you will also be able to view your health information using other applications (apps) compatible with our system.

## 2020-02-13 NOTE — ED PROVIDER NOTE - OBJECTIVE STATEMENT
78 yo male with PMhx of HTN, HLD, DM, anxiety, presents with shortness of breath, productive cough with green sputum, fatigue, and pleuritic chest pain with congestion for 10 days. Patient states he initially went to his PCP office where he had a CXR and was told he had viral PNA. Patient states he did receive the Flu shot this year. Patient also states that when he is laying down he feels as though he is drowning. Patient denies fever, chills, headache, or syncope.

## 2020-02-13 NOTE — ED PROVIDER NOTE - CLINICAL SUMMARY MEDICAL DECISION MAKING FREE TEXT BOX
78 yo  male presents with possible bronchitis will r/o PNA. Less likely to be CHF or ACS but due to risk factors will do basic labs and obtain a CXR. Will give patient a trial of duo nebs and prednisone. Will reassess.

## 2020-02-13 NOTE — ED PROVIDER NOTE - MUSCULOSKELETAL, MLM
Spine appears normal, range of motion is not limited, no muscle or joint tenderness. No lower extremity edema.

## 2020-02-14 PROBLEM — I10 ESSENTIAL (PRIMARY) HYPERTENSION: Chronic | Status: ACTIVE | Noted: 2018-12-24

## 2020-02-14 PROBLEM — E11.9 TYPE 2 DIABETES MELLITUS WITHOUT COMPLICATIONS: Chronic | Status: ACTIVE | Noted: 2018-12-24

## 2020-02-14 PROBLEM — F32.9 MAJOR DEPRESSIVE DISORDER, SINGLE EPISODE, UNSPECIFIED: Chronic | Status: ACTIVE | Noted: 2018-12-24

## 2020-02-14 PROBLEM — F41.9 ANXIETY DISORDER, UNSPECIFIED: Chronic | Status: ACTIVE | Noted: 2018-12-24

## 2020-02-14 PROBLEM — E78.5 HYPERLIPIDEMIA, UNSPECIFIED: Chronic | Status: ACTIVE | Noted: 2018-12-24

## 2021-02-03 NOTE — PROGRESS NOTE ADULT - PROBLEM SELECTOR PROBLEM 6
Depression
Pt reviewed in rounding  Pt is medically cleared for dc, placement confirmation pending  [facility] is now following pt  Cm will continue to follow with them in [facility] for dcp  Family made aware 
Depression
Vitamin D deficiency
Depression
Vitamin D deficiency
Depression
Vitamin D deficiency

## 2022-09-22 NOTE — ED ADULT TRIAGE NOTE - PAIN RATING/NUMBER SCALE (0-10): REST
Please tell chilo that if specialist stated the voltaren will not work, then we will send over a discontinue order. Please thank her for the update. 6

## 2024-12-04 NOTE — PATIENT PROFILE ADULT - NSPROMEDSPUMP_GEN_A_NUR
[4] : 4 [0] : 0 [Dull/Aching] : dull/aching [Constant] : constant [Household chores] : household chores none [Leisure] : leisure [Sleep] : sleep [Lying in bed] : lying in bed [Retired] : Work status: retired [de-identified] : Right shoulder pain for a few months. No known injury. [] : no [FreeTextEntry1] : Right shoulder [FreeTextEntry9] : meloxicam  and/or Tylenol [de-identified] : Dr Smalls [de-identified] : 2008 [de-identified] : xray and sono

## 2025-01-13 NOTE — ED ADULT NURSE NOTE - EXTENSIONS OF SELF_ADULT
[NAD] : in no acute distress [Respiratory Distress] : no respiratory distress  [FreeTextEntry1] : (limited exam due to telehealth) None